# Patient Record
Sex: MALE | Race: WHITE | NOT HISPANIC OR LATINO | Employment: OTHER | URBAN - METROPOLITAN AREA
[De-identification: names, ages, dates, MRNs, and addresses within clinical notes are randomized per-mention and may not be internally consistent; named-entity substitution may affect disease eponyms.]

---

## 2017-01-19 ENCOUNTER — OFFICE VISIT (OUTPATIENT)
Dept: CARDIOLOGY | Facility: MEDICAL CENTER | Age: 77
End: 2017-01-19

## 2017-01-19 VITALS
HEART RATE: 60 BPM | WEIGHT: 160 LBS | OXYGEN SATURATION: 94 % | HEIGHT: 67 IN | SYSTOLIC BLOOD PRESSURE: 112 MMHG | BODY MASS INDEX: 25.11 KG/M2 | DIASTOLIC BLOOD PRESSURE: 70 MMHG

## 2017-01-19 DIAGNOSIS — I25.10 CORONARY ARTERY DISEASE DUE TO CALCIFIED CORONARY LESION: ICD-10-CM

## 2017-01-19 DIAGNOSIS — I25.10 ATHEROSCLEROSIS OF NATIVE CORONARY ARTERY OF NATIVE HEART WITHOUT ANGINA PECTORIS: ICD-10-CM

## 2017-01-19 DIAGNOSIS — I25.84 CORONARY ARTERY DISEASE DUE TO CALCIFIED CORONARY LESION: ICD-10-CM

## 2017-01-19 DIAGNOSIS — E78.5 DYSLIPIDEMIA: ICD-10-CM

## 2017-01-19 DIAGNOSIS — Z78.9 STATIN INTOLERANCE: ICD-10-CM

## 2017-01-19 DIAGNOSIS — I10 ESSENTIAL HYPERTENSION: ICD-10-CM

## 2017-01-19 DIAGNOSIS — Z95.5 STENTED CORONARY ARTERY: ICD-10-CM

## 2017-01-19 PROCEDURE — 99214 OFFICE O/P EST MOD 30 MIN: CPT | Performed by: INTERNAL MEDICINE

## 2017-01-19 NOTE — MR AVS SNAPSHOT
"        Arcenio Barnett   2017 2:30 PM   Office Visit   MRN: [id number]    Department:  [facility]   Dept Phone:  [phone]    Description:  Male : 1940   Provider:  Zaheer Winter M.D.           Reason for Visit     Follow-Up Patient had labs done at [facility] today and here for a recheck.       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       You were diagnosed with     Essential hypertension   [7996254]       Dyslipidemia   [652348]       Coronary artery disease due to calcified coronary lesion   [6486203]       Stented coronary artery   [969909]       Atherosclerosis of native coronary artery of native heart without angina pectoris   [2109871]       Statin intolerance   [490470]         Vital Signs     Blood Pressure Pulse Height Weight Body Mass Index Oxygen Saturation    112/70 mmHg 60 1.702 m (5' 7.01\") 72.576 kg (160 lb) 25.05 kg/m2 94%    Smoking Status                   Never Smoker            Basic Information     Date Of Birth Sex Race Ethnicity Preferred Language    1940 Male White Non- English      Your appointments     Feb 15, 2017  1:00 PM   Established Patient with DARA Alcazar   [facility] [address]    [address]  [facility] [address]  [place] [address]   [phone]           You will be receiving a confirmation call a few days before your appointment from our automated call confirmation system.              Problem List              ICD-10-CM Priority Class Noted - Resolved    Dyslipidemia E78.5   10/10/2014 - Present    Essential hypertension I10   2015 - Present    Coronary artery disease involving autologous vein coronary bypass graft without angina pectoris I25.810   2015 - Present    Cholelithiasis K80.20   2016 - Present    Benign prostatic hyperplasia N40.0   8/10/2016 - Present      Health Maintenance       "    Date Due Completion Dates    IMM DTaP/Tdap/Td Vaccine (1 - Tdap) 1/1/1959 ---    IMM ZOSTER VACCINE 1/1/2000 ---    IMM PNEUMOCOCCAL 65+ (ADULT) LOW/MEDIUM RISK SERIES (1 of 2 - PCV13) 1/1/2005 ---    IMM INFLUENZA (1) 9/1/2016 11/10/2014    COLONOSCOPY 10/16/2025 10/16/2015 (Declined)    Override on 10/16/2015: Patient Declined (pt declined)            Current Immunizations     Influenza Vaccine Adult HD 11/10/2014      Below and/or attached are the medications your provider expects you to take. Review all of your home medications and newly ordered medications with your provider and/or pharmacist. Follow medication instructions as directed by your provider and/or pharmacist. Please keep your medication list with you and share with your provider. Update the information when medications are discontinued, doses are changed, or new medications (including over-the-counter products) are added; and carry medication information at all times in the event of emergency situations     Allergies:  STATINS - (reactions not documented)               Medications  Valid as of: January 19, 2017 -  3:02 PM    Generic Name Brand Name Tablet Size Instructions for use    Alirocumab (Solution Pen-injector) Alirocumab 75 MG/ML Inject 75 mg as instructed every 14 days.        Aspirin (Chew Tab) ASA 81 MG Take 81 mg by mouth every day.        Coenzyme Q10 (Cap) CoQ10 50 MG Take  by mouth.        Ezetimibe (Tab) ZETIA 10 MG Take 1 Tab by mouth every day.        Lisinopril (Tab) PRINIVIL 5 MG Take 1 Tab by mouth every day.        Metoprolol Succinate (TABLET SR 24 HR) TOPROL XL 50 MG Take 1 Tab by mouth every day.        Terazosin HCl (Cap) HYTRIN 10 MG TAKE ONE CAPSULE BY MOUTH AT BEDTIME        Ticagrelor (Tab) BRILINTA 90 MG Take 1 Tab by mouth 2 Times a Day.        .                 Medicines prescribed today were sent to:     [facility]  [place] [address]    [address] [facility] [place] [address]    Phone:  [phone] Fax: [phone]    Open 24 Hours?: No      Medication refill instructions:       If your prescription bottle indicates you have medication refills left, it is not necessary to call your provider’s office. Please contact your pharmacy and they will refill your medication.    If your prescription bottle indicates you do not have any refills left, you may request refills at any time through one of the following ways: The online [email or web address] system (except Urgent Care), by calling your provider’s office, or by asking your pharmacy to contact your provider’s office with a refill request. Medication refills are processed only during regular business hours and may not be available until the next business day. Your provider may request additional information or to have a follow-up visit with you prior to refilling your medication.   *Please Note: Medication refills are assigned a new Rx number when refilled electronically. Your pharmacy may indicate that no refills were authorized even though a new prescription for the same medication is available at the pharmacy. Please request the medicine by name with the pharmacy before contacting your provider for a refill.        Your To Do List     Future Labs/Procedures Complete By Expires    COMP METABOLIC PANEL  As directed 1/20/2018         [email or web address] Access Code: [email or web address]  Expires: 1/28/2017  9:07 AM    Hotlease.Com  A secure, online tool to manage your health information     [employer]’s Hotlease.Com® is a secure, online tool that connects you to your personalized health information from the privacy of your home -- day or night - making it very easy for you to manage your healthcare. Once the activation process is completed, you can even access your medical information using the Hotlease.Com román, which is available for free in the Apple Román store or Google Play store.     Hotlease.Com provides the following levels of access (as shown below):   My Chart Features   [facility]  Care Doctor [facility]  Specialists [facility]  Urgent  Care Non-[facility]  Primary Care  Doctor   Email your healthcare team securely and privately 24/7 X X X    Manage appointments: schedule your next appointment; view details of past/upcoming appointments X      Request prescription refills. X      View recent personal medical records, including lab and immunizations X X X X   View health record, including health history, allergies, medications X X X X   Read reports about your outpatient visits, procedures, consult and ER notes X X X X   See your discharge summary, which is a recap of your hospital and/or ER visit that includes your diagnosis, lab results, and care plan. X X       How to register for extraTKT:  1. Go to  [email or web address].  2. Click on the Sign Up Now box, which takes you to the New Member Sign Up page. You will need to provide the following information:  a. Enter your extraTKT Access Code exactly as it appears at the top of this page. (You will not need to use this code after you’ve completed the sign-up process. If you do not sign up before the expiration date, you must request a new code.)   b. Enter your date of birth.   c. Enter your home email address.   d. Click Submit, and follow the next screen’s instructions.  3. Create a extraTKT ID. This will be your extraTKT login ID and cannot be changed, so think of one that is secure and easy to remember.  4. Create a extraTKT password. You can change your password at any time.  5. Enter your Password Reset Question and Answer. This can be used at a later time if you forget your password.   6. Enter your e-mail address. This allows you to receive e-mail notifications when new information is available in extraTKT.  7. Click Sign Up. You can now view your health information.    For assistance activating your extraTKT account, call [phone]

## 2017-01-23 ENCOUNTER — TELEPHONE (OUTPATIENT)
Dept: CARDIOLOGY | Facility: MEDICAL CENTER | Age: 77
End: 2017-01-23

## 2017-01-23 ASSESSMENT — ENCOUNTER SYMPTOMS
EYE PAIN: 0
VOMITING: 0
BLURRED VISION: 0
NAUSEA: 0
MYALGIAS: 0
HEADACHES: 0
BLOOD IN STOOL: 0
SHORTNESS OF BREATH: 0
LOSS OF CONSCIOUSNESS: 0
DIZZINESS: 0
HALLUCINATIONS: 0
CLAUDICATION: 0
CHILLS: 0
BRUISES/BLEEDS EASILY: 0
ABDOMINAL PAIN: 0
SENSORY CHANGE: 0
FEVER: 0
FALLS: 0
ORTHOPNEA: 0
DEPRESSION: 0
WEIGHT LOSS: 0
COUGH: 0
PND: 0
SPEECH CHANGE: 0
EYE DISCHARGE: 0
DOUBLE VISION: 0
PALPITATIONS: 0

## 2017-01-23 NOTE — TELEPHONE ENCOUNTER
PAR sent to plan:  Arcenio Barnett (Almanzar: [id number])  Praluent 75MG/ML pen-injectors  Status: Sent to Plan  Created: January 23rd, 2017

## 2017-01-23 NOTE — PROGRESS NOTES
Subjective:   Arcenio Barnett is a 76 y.o. male who presents today for cardiac care. Patient has a history of coronary atrial disease status post stenting of his left and right coronary artery per patient report. The last procedure was done in 2007. Both procedures were done in [facility].     Also, prior to last visit, patient was seen at [facility] for chest pain and found to have ST elevation MI. Patient was taken to the Cath Lab and had in-stent restenosis of the LAD stents. Patient underwent PCI and stenting of the LAD in 6/2016. (preserved LVF)    In the interim, patient has been doing well without having any symptoms. Patient denies having chest pain, dyspnea, palpitation, presyncope, syncope episodes. Able to climb up at least 2 flights of stairs.    He is not tolerating Pravachol and other form of statin due to muscle pain.    Past Medical History   Diagnosis Date   • Allergy    • History of bladder infections    • CAD (coronary artery disease)      cardiac stents x 2 2000, [place], [facility] 2007 prox LAD   • History of coronary artery stent placement 8/1/2014     Prox LAD, also RCA   • Hyperlipidemia    • Hypertension    • Cholelithiasis      History reviewed. No pertinent past surgical history.  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      Outpatient Encounter Prescriptions as of 1/19/2017   Medication Sig Dispense Refill   • Alirocumab (PRALUENT) 75 MG/ML Solution Pen-injector Inject 75 mg as instructed every 14 days. 2 PEN 11   • terazosin (HYTRIN) 10 MG capsule TAKE ONE  "CAPSULE BY MOUTH AT BEDTIME 90 Cap 2   • ticagrelor (BRILINTA) 90 MG Tab tablet Take 1 Tab by mouth 2 Times a Day. 60 Tab 11   • metoprolol SR (TOPROL XL) 50 MG TABLET SR 24 HR Take 1 Tab by mouth every day. 30 Tab 11   • lisinopril (PRINIVIL) 5 MG Tab Take 1 Tab by mouth every day. 90 Tab 3   • aspirin (ASA) 81 MG Chew Tab chewable tablet Take 81 mg by mouth every day.     • ezetimibe (ZETIA) 10 MG Tab Take 1 Tab by mouth every day. 90 Tab 1   • Coenzyme Q10 (COQ10) 50 MG Cap Take  by mouth.     • [DISCONTINUED] pravastatin (PRAVACHOL) 20 MG Tab Take 1 Tab by mouth every day. (Patient not taking: Reported on 1/19/2017) 30 Tab 11     No facility-administered encounter medications on file as of 1/19/2017.     Review of Systems   Constitutional: Negative for fever, chills, weight loss and malaise/fatigue.   HENT: Negative for ear discharge, ear pain, hearing loss and nosebleeds.    Eyes: Negative for blurred vision, double vision, pain and discharge.   Respiratory: Negative for cough and shortness of breath.    Cardiovascular: Negative for chest pain, palpitations, orthopnea, claudication, leg swelling and PND.   Gastrointestinal: Negative for nausea, vomiting, abdominal pain, blood in stool and melena.   Genitourinary: Negative for dysuria and hematuria.   Musculoskeletal: Negative for myalgias, joint pain and falls.   Skin: Negative for itching and rash.   Neurological: Negative for dizziness, sensory change, speech change, loss of consciousness and headaches.   Endo/Heme/Allergies: Negative for environmental allergies. Does not bruise/bleed easily.   Psychiatric/Behavioral: Negative for depression, suicidal ideas and hallucinations.        Objective:   /70 mmHg  Pulse 60  Ht 1.702 m (5' 7.01\")  Wt 72.576 kg (160 lb)  BMI 25.05 kg/m2  SpO2 94%    Physical Exam   Constitutional: He is oriented to person, place, and time. No distress.   HENT:   Head: Normocephalic and atraumatic.   Eyes: EOM are normal. "   Neck: Normal range of motion. No JVD present.   Cardiovascular: Normal rate, regular rhythm, normal heart sounds and intact distal pulses.  Exam reveals no gallop and no friction rub.    No murmur heard.  Bilateral femoral pulses are 2+, bilateral dorsalis pedis pulses are 2+, bilateral posterior tibialis pulses are 2+.   Pulmonary/Chest: No respiratory distress. He has no wheezes. He has no rales. He exhibits no tenderness.   Abdominal: Soft. Bowel sounds are normal. There is no tenderness. There is no rebound and no guarding.   The is no presence of abdominal bruits   Musculoskeletal: Normal range of motion. He exhibits no edema or tenderness.   Neurological: He is alert and oriented to person, place, and time.   Skin: Skin is warm and dry.   Psychiatric: He has a normal mood and affect.   Nursing note and vitals reviewed.      Assessment:     1. Essential hypertension  Alirocumab (PRALUENT) 75 MG/ML Solution Pen-injector    COMP METABOLIC PANEL    LIPID PANEL   2. Dyslipidemia  Alirocumab (PRALUENT) 75 MG/ML Solution Pen-injector    COMP METABOLIC PANEL    LIPID PANEL   3. Coronary artery disease due to calcified coronary lesion  Alirocumab (PRALUENT) 75 MG/ML Solution Pen-injector    COMP METABOLIC PANEL    LIPID PANEL   4. Stented coronary artery  Alirocumab (PRALUENT) 75 MG/ML Solution Pen-injector    COMP METABOLIC PANEL    LIPID PANEL   5. Atherosclerosis of native coronary artery of native heart without angina pectoris  Alirocumab (PRALUENT) 75 MG/ML Solution Pen-injector    COMP METABOLIC PANEL    LIPID PANEL   6. Statin intolerance  Alirocumab (PRALUENT) 75 MG/ML Solution Pen-injector    COMP METABOLIC PANEL    LIPID PANEL       Medical Decision Making:  Today's Assessment / Status / Plan:     Blood pressure is well controlled.    At this time, because of his atherosclerosis and prior history of coronary stenting, I think patient is a great candidate for PCSK9 inhibitors. Patient was informed about the  risks and benefits and at this time he has agreed to proceed with this therapy.    Continue lisinopril, aspirin, Brilinta.

## 2017-01-23 NOTE — TELEPHONE ENCOUNTER
----- Message from Zaheer Winter M.D. sent at 1/19/2017  2:52 PM PST -----  Dear Erin,    Can you please have Praluent approved for this patient?    Thank you,  Vishal.

## 2017-01-26 ENCOUNTER — TELEPHONE (OUTPATIENT)
Dept: CARDIOLOGY | Facility: MEDICAL CENTER | Age: 77
End: 2017-01-26

## 2017-01-26 NOTE — TELEPHONE ENCOUNTER
----- Message from Mahnaz Presley sent at 1/26/2017 10:25 AM PST -----  Regarding: patient wants to change his prescription for Zetia to Dr. Moy RAMIREZ/Suzan        Patient usually gets his Zetia prescribed by Dr. Murray. He wants to know if he can change it so he gets the medication from Dr. Moy flores. He can be reached at [phone]

## 2017-01-26 NOTE — TELEPHONE ENCOUNTER
Spoke with patient who states he gets his Zetia through [facility] Tel: [phone]  Telephone call to number and it is the [employer] Patient Assistance Program.  He needs to re-apply to the program.  Advised him that he will need to complete paper work again and when he has completed his portion Dr. Winter's portion can be completed and faxed back to company.  He is agreeable with plan.  He is also working on Praluent application he states.

## 2017-02-08 ENCOUNTER — TELEPHONE (OUTPATIENT)
Dept: CARDIOLOGY | Facility: MEDICAL CENTER | Age: 77
End: 2017-02-08

## 2017-02-08 NOTE — TELEPHONE ENCOUNTER
TC to patient who would not speak English to me as he had in the past.  He was to reapply for the [facility] patient assistance program to get his Zetia.  He was given the number to call and get the paper work  Telephone [phone]  Await call back from someone willing to speak with me

## 2017-02-08 NOTE — TELEPHONE ENCOUNTER
----- Message from Maggie Mccarthy sent at 2/8/2017  9:30 AM PST -----  Regarding: status of Zetia paperwork  Contact: [phone]  JIM/susan  Pt calling for status of the paperwork (reference to 1/26 notes) for [facility].   Please call to sorin, [phone].

## 2017-02-14 NOTE — TELEPHONE ENCOUNTER
Spoke with patient who completed his portions and scanned into chart shows [facility] 1[facility]30/17 [facility]  He will contact them to see status.

## 2017-02-14 NOTE — TELEPHONE ENCOUNTER
Pt brought to office another form for [facility] Patient Assistance for Zetia.  Form completed and mailed.  Sent to scan.

## 2017-02-15 ENCOUNTER — APPOINTMENT (OUTPATIENT)
Dept: MEDICAL GROUP | Facility: MEDICAL CENTER | Age: 77
End: 2017-02-15
Payer: MEDICARE

## 2017-02-15 NOTE — TELEPHONE ENCOUNTER
Pt brought in another form from [facility], completed signed by Dr. Winter and [facility]  Scanned to chart.

## 2017-04-10 ENCOUNTER — HOSPITAL ENCOUNTER (OUTPATIENT)
Dept: HOSPITAL 8 - PETCFH | Age: 77
Discharge: HOME | End: 2017-04-10
Attending: PHYSICIAN ASSISTANT
Payer: MEDICARE

## 2017-04-10 DIAGNOSIS — M47.896: Primary | ICD-10-CM

## 2017-04-10 DIAGNOSIS — G89.29: ICD-10-CM

## 2017-04-10 DIAGNOSIS — M47.898: ICD-10-CM

## 2017-04-10 DIAGNOSIS — M54.5: ICD-10-CM

## 2017-04-10 DIAGNOSIS — M17.9: ICD-10-CM

## 2017-04-10 PROCEDURE — 78306 BONE IMAGING WHOLE BODY: CPT

## 2017-04-10 PROCEDURE — A9503 TC99M MEDRONATE: HCPCS

## 2017-06-30 DIAGNOSIS — I10 ESSENTIAL HYPERTENSION: ICD-10-CM

## 2017-06-30 RX ORDER — METOPROLOL SUCCINATE 50 MG/1
TABLET, EXTENDED RELEASE ORAL
Qty: 30 TAB | Refills: 11 | Status: SHIPPED | OUTPATIENT
Start: 2017-06-30 | End: 2018-03-21

## 2017-07-18 ENCOUNTER — TELEPHONE (OUTPATIENT)
Dept: MEDICAL GROUP | Facility: MEDICAL CENTER | Age: 77
End: 2017-07-18

## 2017-07-18 NOTE — TELEPHONE ENCOUNTER
1. Caller Name: Cara Winter Office                                         Call Back Number: [phone]        Patient approves a detailed voicemail message: N\A    2. SPECIFIC Action To Be Taken: Referral for Cardiology, Cara called for the patient she said the patient has an up coming appointment to see DR. WINTER for E 78.5, I 10 and I 25.810 Cara wanted to know if you would place a referral for the patient to see Dr. Vishal Winter with extra visits? Please advise    3. Diagnosis/Clinical Reason for Request: E 78.5, I 10 and I 25.810    4. Specialty & Provider Name/Lab/Imaging Location:  [facility]     5. Is appointment scheduled for requested order/referral: YES  7/20  At 2:45

## 2017-07-19 DIAGNOSIS — I25.810 CORONARY ARTERY DISEASE INVOLVING AUTOLOGOUS VEIN CORONARY BYPASS GRAFT WITHOUT ANGINA PECTORIS: ICD-10-CM

## 2017-08-02 DIAGNOSIS — I25.84 CORONARY ARTERY DISEASE DUE TO CALCIFIED CORONARY LESION: ICD-10-CM

## 2017-08-02 DIAGNOSIS — I25.10 CORONARY ARTERY DISEASE DUE TO CALCIFIED CORONARY LESION: ICD-10-CM

## 2017-08-02 RX ORDER — LISINOPRIL 5 MG/1
TABLET ORAL
Qty: 90 TAB | Refills: 2 | Status: SHIPPED | OUTPATIENT
Start: 2017-08-02 | End: 2018-12-03 | Stop reason: SDUPTHER

## 2017-10-02 RX ORDER — TERAZOSIN 10 MG/1
CAPSULE ORAL
Refills: 0 | OUTPATIENT
Start: 2017-10-02

## 2017-10-02 NOTE — TELEPHONE ENCOUNTER
Patient warned on last refill request that he needed a visit for any further refills because he has not been seen since 8/16. I do not see that he has made an appointment. Please contact him and have him come in this week.

## 2018-03-15 ENCOUNTER — TELEPHONE (OUTPATIENT)
Dept: CARDIOLOGY | Facility: MEDICAL CENTER | Age: 78
End: 2018-03-15

## 2018-03-20 ENCOUNTER — TELEPHONE (OUTPATIENT)
Dept: CARDIOLOGY | Facility: MEDICAL CENTER | Age: 78
End: 2018-03-20

## 2018-03-20 NOTE — TELEPHONE ENCOUNTER
LVM for pt to se if he had recent labs done, office visit in Jan with TW lab slip was given to pt. Upcoming office visit with TW on 2/23/2018

## 2018-03-20 NOTE — TELEPHONE ENCOUNTER
Pt called back and has had labs done at [facility]. Faxed request to [phone]

## 2018-03-21 ENCOUNTER — OFFICE VISIT (OUTPATIENT)
Dept: CARDIOLOGY | Facility: MEDICAL CENTER | Age: 78
End: 2018-03-21
Payer: MEDICARE

## 2018-03-21 VITALS
WEIGHT: 158 LBS | BODY MASS INDEX: 24.8 KG/M2 | HEIGHT: 67 IN | SYSTOLIC BLOOD PRESSURE: 158 MMHG | DIASTOLIC BLOOD PRESSURE: 88 MMHG | OXYGEN SATURATION: 96 % | HEART RATE: 52 BPM

## 2018-03-21 DIAGNOSIS — I10 ESSENTIAL HYPERTENSION: ICD-10-CM

## 2018-03-21 DIAGNOSIS — I25.10 CORONARY ARTERY DISEASE INVOLVING NATIVE CORONARY ARTERY OF NATIVE HEART WITHOUT ANGINA PECTORIS: ICD-10-CM

## 2018-03-21 DIAGNOSIS — I25.5 ISCHEMIC CARDIOMYOPATHY: ICD-10-CM

## 2018-03-21 DIAGNOSIS — E78.5 DYSLIPIDEMIA: ICD-10-CM

## 2018-03-21 PROCEDURE — 99214 OFFICE O/P EST MOD 30 MIN: CPT | Performed by: INTERNAL MEDICINE

## 2018-03-21 RX ORDER — CARVEDILOL 12.5 MG/1
12.5 TABLET ORAL 2 TIMES DAILY WITH MEALS
Qty: 180 TAB | Refills: 3 | Status: SHIPPED | OUTPATIENT
Start: 2018-03-21 | End: 2018-12-03 | Stop reason: SDUPTHER

## 2018-03-21 RX ORDER — PRAVASTATIN SODIUM 20 MG
20 TABLET ORAL DAILY
Qty: 90 TAB | Refills: 3 | Status: SHIPPED | OUTPATIENT
Start: 2018-03-21 | End: 2018-06-26

## 2018-03-21 RX ORDER — CARVEDILOL 6.25 MG/1
6.25 TABLET ORAL 2 TIMES DAILY WITH MEALS
COMMUNITY
End: 2018-03-21 | Stop reason: SDUPTHER

## 2018-03-21 NOTE — PROGRESS NOTES
Chief Complaint   Patient presents with   • Coronary Artery Disease   • Chest Pressure       Subjective:   Arcenio Barnett is a 78 y.o. male who presents today to establish care with a new cardiologist previous patient of Dr. Vishal breen and Dr. Mohsen at [facility]. History of CAD status post PCI LAD and RCA 2007 with subsequent in-stent restenosis of the LAD 6/2016 with a new ABHISHEK placed. He's been maintained on aspirin and Brilinta now low-dose, beta blocker and Ace. There was confusion with his medical regimen and he is currently taking Toprol-XL and Coreg. Blood pressure is suboptimal. Has not had recent lab tests. Is not on a statin due to history of myalgias although he indicates he tolerated pravastatin just fine. Had an episode of 10 2nd sharp stabbing pain while trying to go to bed on the 1st of this month that has not recurred. Again it lasted 10 seconds without associated with anything else and was made worse by deep breathing. Subsequently he has been moderately active without any significant symptoms.    Denies any other cardiovascular symptoms including shortness of breath, dyspnea on exertion, lightheadedness, syncope or presyncope, lower extremity edema, PND, orthopnea or palpitations.      Past Medical History:   Diagnosis Date   • Allergy    • CAD (coronary artery disease)     cardiac stents x 2 2000, 2007 [place] 2007 prox LAD   • Cholelithiasis    • History of bladder infections    • History of coronary artery stent placement 8/1/2014    Prox LAD, also RCA   • Hyperlipidemia    • Hypertension      History reviewed. No pertinent surgical history.  Family History   Problem Relation Age of Onset   • Cancer Father 57     lung cancer   • Cancer Brother 52     lung cancer   • Cancer Sister      breast   • Cancer Sister      breast   • Cancer Sister      pancreatic   • Hyperlipidemia Mother    • Stroke Mother      Social History     Social History   • Marital status:  "     Spouse name: N/A   • Number of children: N/A   • Years of education: N/A     Occupational History   • Not on file.     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      Outpatient Encounter Prescriptions as of 3/21/2018   Medication Sig Dispense Refill   • carvedilol (COREG) 12.5 MG Tab Take 1 Tab by mouth 2 times a day, with meals. 180 Tab 3   • Ticagrelor 60 MG Tab Take 60 mg by mouth 2 Times a Day. 180 Tab 3   • pravastatin (PRAVACHOL) 20 MG Tab Take 1 Tab by mouth every day. 90 Tab 3   • lisinopril (PRINIVIL) 5 MG Tab TAKE ONE TABLET BY MOUTH ONCE DAILY 90 Tab 2   • terazosin (HYTRIN) 10 MG capsule TAKE ONE CAPSULE BY MOUTH AT BEDTIME 90 Cap 0   • aspirin (ASA) 81 MG Chew Tab chewable tablet Take 81 mg by mouth every day.     • [DISCONTINUED] carvedilol (COREG) 6.25 MG Tab Take 6.25 mg by mouth 2 times a day, with meals.     • [DISCONTINUED] metoprolol SR (TOPROL XL) 50 MG TABLET SR 24 HR TAKE ONE TABLET BY MOUTH ONCE DAILY 30 Tab 11   • [DISCONTINUED] Alirocumab (PRALUENT) 75 MG/ML Solution Pen-injector Inject 75 mg as instructed every 14 days. 2 PEN 11   • [DISCONTINUED] Coenzyme Q10 (COQ10) 50 MG Cap Take  by mouth.     • [DISCONTINUED] ticagrelor (BRILINTA) 90 MG Tab tablet Take 1 Tab by mouth 2 Times a Day. 60 Tab 11   • [DISCONTINUED] ezetimibe (ZETIA) 10 MG Tab Take 1 Tab by mouth every day. (Patient not taking: Reported on 3/21/2018) 90 Tab 1     No facility-administered encounter medications on file as of 3/21/2018.      Review of Systems   All other systems reviewed and are negative.       Objective:   /88   Pulse (!) 52   Ht 1.702 m (5' 7\")   Wt 71.7 kg (158 lb)   " SpO2 96%   BMI 24.75 kg/m²     Physical Exam   Constitutional: He is oriented to person, place, and time. He appears well-developed and well-nourished. No distress.   HENT:   Head: Normocephalic and atraumatic.   Right Ear: External ear normal.   Left Ear: External ear normal.   Eyes: Conjunctivae and EOM are normal. Pupils are equal, round, and reactive to light. Right eye exhibits no discharge. Left eye exhibits no discharge. No scleral icterus.   Neck: Normal range of motion. Neck supple. No JVD present. No tracheal deviation present. No thyromegaly present.   Cardiovascular: Normal rate, regular rhythm and intact distal pulses.  PMI is not displaced.  Exam reveals no gallop and no friction rub.    No murmur heard.  Pulses:       Carotid pulses are 2+ on the right side, and 2+ on the left side.       Radial pulses are 2+ on the left side.        Popliteal pulses are 2+ on the right side, and 2+ on the left side.        Dorsalis pedis pulses are 2+ on the right side, and 2+ on the left side.        Posterior tibial pulses are 2+ on the right side, and 2+ on the left side.   Pulmonary/Chest: Effort normal and breath sounds normal. No respiratory distress. He has no wheezes. He has no rales. He exhibits no tenderness.   Abdominal: Soft. Bowel sounds are normal. He exhibits no distension. There is no tenderness.   Musculoskeletal: Normal range of motion. He exhibits no edema, tenderness or deformity.   Neurological: He is alert and oriented to person, place, and time. No cranial nerve deficit (cranial nerves II through XII grossly intact). Coordination normal.   Skin: Skin is warm and dry. No rash noted. He is not diaphoretic. No erythema. No pallor.   Psychiatric: He has a normal mood and affect. His behavior is normal. Thought content normal.   Vitals reviewed.      Assessment:     1. CAD s/p MVPCI  carvedilol (COREG) 12.5 MG Tab    Ticagrelor 60 MG Tab    ECHOCARDIOGRAM COMP W/O CONT    pravastatin (PRAVACHOL) 20  MG Tab    PCI LAD and RCA 2007  PCI LAD ISR ABHISHEK STEMI 5/2016   2. Essential hypertension  carvedilol (COREG) 12.5 MG Tab   3. Dyslipidemia  pravastatin (PRAVACHOL) 20 MG Tab    COMP METABOLIC PANEL    LIPID PROFILE   4. Ischemic cardiomyopathy  Ticagrelor 60 MG Tab    ECHOCARDIOGRAM COMP W/O CONT    Recovered LVEF       Medical Decision Making:  Today's Assessment / Status / Plan:     He has an apparently recovered LVEF according to records. His symptoms were noncardiac in nature. They have not recurred. He does have significant COPD so I will have a low threshold for repeat stress testing. He has a history of mild valvular disease and that should be rechecked. He should only be on one type of beta blocker and I will recommended Coreg and discontinuing Toprol as he has poorly controlled hypertension. Recommend reinitiating the statin he tolerated which was pravastatin 20 mg. I agree with long-term dual antiplatelet therapy. The current regimen is acceptable.    1. Echocardiogram  2. Discontinue Toprol-XL and increase Coreg to 12.5 mg twice a day  3. Continue aspirin 81 mg and take a regular 60 mg twice a day with lifelong DAPT as tolerated  4. Restart pravastatin 20 mg daily as he tolerated this well in the past. He did not tolerate atorvastatin or Crestor. Right PCSK9 inhibitor therapy but did not take it.    Follow-up after testing.

## 2018-03-21 NOTE — PATIENT INSTRUCTIONS
1. STOP METOPROLOL    2. INCREASE CARVEDILOL TO DOUBLE    3. CONTINUE BRILINTA 60MG 2X/DAY    4. CONTINUE ASPIRIN 81MG DAILY    5. RESTART PRAVASTATIN    6. LABS IN 2 MONTHS    7. ECHOCARDIOGRAM    8. FUV IN 3M

## 2018-03-27 ENCOUNTER — TELEPHONE (OUTPATIENT)
Dept: CARDIOLOGY | Facility: MEDICAL CENTER | Age: 78
End: 2018-03-27

## 2018-03-27 DIAGNOSIS — E78.5 DYSLIPIDEMIA: ICD-10-CM

## 2018-03-27 NOTE — TELEPHONE ENCOUNTER
Patient assistance form received for [employer] to cover Zetia prescription. Pt needs Dr. Lopez to sign to get prescription covered. Per TW last OV note from 3/21, it was noted pt was not taking Zetia.     Called to s/w pt. S/w pt's wife, notes that Dr. Lopez noted that it would be okay for him to start taking Zetia again instead of pravastatin. Informed her that I will need to FU with Dr. Lopez. Educated that he is out of the office this week, they are OK with waiting. Educated that I will FU once Dr. Lopez advise's. Wife appreciative of assistance.     Information placed in TW's folder to review.

## 2018-04-12 RX ORDER — EZETIMIBE 10 MG/1
10 TABLET ORAL DAILY
Qty: 90 TAB | Refills: 3 | COMMUNITY
Start: 2018-04-12 | End: 2021-03-01

## 2018-04-12 NOTE — TELEPHONE ENCOUNTER
Information reviewed by Dr. Lopez. TW okay for pt to take Zetia 10mg qDaily. Patient assistance form signed and filled out. Information sent in mail to preaddressed envelope to [facility] Assistance Program [address] [place] [id number].    Med/rec updated and patient notified. He is appreciative of assistance and denies need for copy mailed to home address. Copy placed in scan.

## 2018-05-03 ENCOUNTER — OFFICE VISIT (OUTPATIENT)
Dept: MEDICAL GROUP | Facility: MEDICAL CENTER | Age: 78
End: 2018-05-03
Payer: MEDICARE

## 2018-05-03 ENCOUNTER — HOSPITAL ENCOUNTER (OUTPATIENT)
Facility: MEDICAL CENTER | Age: 78
End: 2018-05-03
Attending: FAMILY MEDICINE
Payer: MEDICARE

## 2018-05-03 VITALS
BODY MASS INDEX: 24.64 KG/M2 | SYSTOLIC BLOOD PRESSURE: 108 MMHG | HEIGHT: 67 IN | HEART RATE: 53 BPM | WEIGHT: 157 LBS | OXYGEN SATURATION: 100 % | TEMPERATURE: 99.1 F | DIASTOLIC BLOOD PRESSURE: 66 MMHG

## 2018-05-03 DIAGNOSIS — Z12.5 PROSTATE CANCER SCREENING: ICD-10-CM

## 2018-05-03 DIAGNOSIS — Z76.89 ENCOUNTER TO ESTABLISH CARE WITH NEW DOCTOR: ICD-10-CM

## 2018-05-03 DIAGNOSIS — Z00.00 MEDICARE ANNUAL WELLNESS VISIT, SUBSEQUENT: ICD-10-CM

## 2018-05-03 DIAGNOSIS — I10 ESSENTIAL HYPERTENSION: ICD-10-CM

## 2018-05-03 DIAGNOSIS — N40.0 BENIGN PROSTATIC HYPERPLASIA, UNSPECIFIED WHETHER LOWER URINARY TRACT SYMPTOMS PRESENT: ICD-10-CM

## 2018-05-03 DIAGNOSIS — Z79.891 CHRONIC USE OF OPIATE DRUGS THERAPEUTIC PURPOSES: ICD-10-CM

## 2018-05-03 DIAGNOSIS — E55.9 VITAMIN D DEFICIENCY: ICD-10-CM

## 2018-05-03 DIAGNOSIS — M17.0 BILATERAL PRIMARY OSTEOARTHRITIS OF KNEE: ICD-10-CM

## 2018-05-03 DIAGNOSIS — I25.10 CORONARY ARTERY DISEASE INVOLVING NATIVE CORONARY ARTERY OF NATIVE HEART WITHOUT ANGINA PECTORIS: ICD-10-CM

## 2018-05-03 DIAGNOSIS — E78.5 DYSLIPIDEMIA: ICD-10-CM

## 2018-05-03 DIAGNOSIS — B35.1 ONYCHOMYCOSIS: ICD-10-CM

## 2018-05-03 DIAGNOSIS — B35.3 TINEA PEDIS OF BOTH FEET: ICD-10-CM

## 2018-05-03 LAB — AMBIGUOUS DTTM AMBI4: NORMAL

## 2018-05-03 PROCEDURE — 80307 DRUG TEST PRSMV CHEM ANLYZR: CPT

## 2018-05-03 PROCEDURE — 99214 OFFICE O/P EST MOD 30 MIN: CPT | Performed by: FAMILY MEDICINE

## 2018-05-03 RX ORDER — HYDROCODONE BITARTRATE AND ACETAMINOPHEN 10; 325 MG/1; MG/1
.5-1 TABLET ORAL
Qty: 30 TAB | Refills: 0 | Status: SHIPPED | OUTPATIENT
Start: 2018-05-03 | End: 2018-06-02

## 2018-05-03 RX ORDER — PRENATAL VIT 91/IRON/FOLIC/DHA 28-975-200
1 COMBINATION PACKAGE (EA) ORAL 2 TIMES DAILY
Qty: 30 G | Refills: 1 | Status: SHIPPED | OUTPATIENT
Start: 2018-05-03 | End: 2018-12-11

## 2018-05-03 ASSESSMENT — PATIENT HEALTH QUESTIONNAIRE - PHQ9
5. POOR APPETITE OR OVEREATING: 1 - SEVERAL DAYS
SUM OF ALL RESPONSES TO PHQ QUESTIONS 1-9: 8
CLINICAL INTERPRETATION OF PHQ2 SCORE: 2

## 2018-05-06 LAB
6MAM UR QL: NOT DETECTED
7AMINOCLONAZEPAM UR QL: NOT DETECTED
A-OH ALPRAZ UR QL: NOT DETECTED
ALPRAZ UR QL: NOT DETECTED
AMPHET UR QL SCN: NOT DETECTED
ANNOTATION COMMENT IMP: NORMAL
ANNOTATION COMMENT IMP: NORMAL
BARBITURATES UR QL: NOT DETECTED
BUPRENORPHINE UR QL: NOT DETECTED
BZE UR QL: NOT DETECTED
CARBOXYTHC UR QL: NOT DETECTED
CARISOPRODOL UR QL: NOT DETECTED
CLONAZEPAM UR QL: NOT DETECTED
CODEINE UR QL: NOT DETECTED
DIAZEPAM UR QL: NOT DETECTED
ETHYL GLUCURONIDE UR QL: NOT DETECTED
FENTANYL UR QL: NOT DETECTED
HYDROCODONE UR QL: NOT DETECTED
HYDROMORPHONE UR QL: NOT DETECTED
LORAZEPAM UR QL: NOT DETECTED
MDA UR QL: NOT DETECTED
MDEA UR QL: NOT DETECTED
MDMA UR QL: NOT DETECTED
MEPERIDINE UR QL: NOT DETECTED
METHADONE UR QL: NOT DETECTED
METHAMPHET UR QL: NOT DETECTED
MIDAZOLAM UR QL SCN: NOT DETECTED
MORPHINE UR QL: NOT DETECTED
NORBUPRENORPHINE UR QL CFM: NOT DETECTED
NORDIAZEPAM UR QL: NOT DETECTED
NORFENTANYL UR QL: NOT DETECTED
NORHYDROCODONE UR QL CFM: NOT DETECTED
NOROXYCODONE UR QL CFM: NOT DETECTED
NOROXYMORPH CO100 Q0458: NOT DETECTED
OXAZEPAM UR QL: NOT DETECTED
OXYCODONE UR QL: NOT DETECTED
OXYMORPHONE UR QL: NOT DETECTED
PATHOLOGY STUDY: NORMAL
PCP UR QL: NOT DETECTED
PHENTERMINE UR QL: NOT DETECTED
PPAA UR QL: NOT DETECTED
PROPOXYPH UR QL: NOT DETECTED
SERVICE CMNT-IMP: NORMAL
TAPENADOL OSULF CO200 Q0473: NOT DETECTED
TAPENTADOL UR QL SCN: NOT DETECTED
TEMAZEPAM UR QL: NOT DETECTED
TRAMADOL UR QL: NOT DETECTED
ZOLPIDEM UR QL: NOT DETECTED

## 2018-05-07 NOTE — ASSESSMENT & PLAN NOTE
Chronic, stable, well controlled. Patient taking blood pressure medications as directed.    No signs of angioedema (lip swelling, sensation of throat closure, airway compromise, wheezing, respiratory distress, hives, facial swelling).    ROS is NEGATIVE for dizziness, generalized weakness/fatigue, cold sweats, dizziness,  vision/hearing changes, jaw pain/paresthesias, BUE pain/paresthesias/numbness/weakness, chest pain/pressure, palpitations, dyspnea, nausea, RUQ abdominal pain, oliguria/anuria, BLE edema.

## 2018-05-07 NOTE — ASSESSMENT & PLAN NOTE
Chronic, stable, well controlled. Patient had multiple stents placed both in 2007 as well as 2016. Patient is being followed appropriately by cardiologist. Patient is taking medications as indicated for blood pressure as well as cholesterol and aspirin.    Review of most recent lipid profile in March 2018 reveals that his cholesterol panel is very well controlled on combination of pravastatin as well as Zetia.    ROS is NEGATIVE for dizziness, generalized weakness/fatigue, cold sweats, dizziness,  vision/hearing changes, jaw pain/paresthesias, BUE pain/paresthesias/numbness/weakness, chest pain/pressure, palpitations, dyspnea, nausea, RUQ abdominal pain, oliguria/anuria, BLE edema.

## 2018-05-07 NOTE — ASSESSMENT & PLAN NOTE
Patient reports having had vitamin D deficiency past. Review of labs from 2016 reveals that he level that was barely under normal limits at 29. Normal is 30. Patient is advised to take vitamin D supplementation at 2000 international units by mouth daily.

## 2018-05-07 NOTE — ASSESSMENT & PLAN NOTE
Patient and I discussed recent labs (see below; mixed DLD, well-controlled -- see media tab) and that ASCVD risk is increased based on most recent lipid panel, current blood pressure (hypertensive, with medication), diabetes status (non-diabetic), and smoking status (non-smoker).    Patient and I then discussed necessary dietary changes to make to address dyslipidemia.  Patient is currently taking cholesterol lowering medication: pravastatin, ezetimibe.  Patient verbalized understanding.    ROS is NEGATIVE for dizziness, generalized weakness/fatigue, vision/hearing changes, jaw pain/paresthesias, BUE pain/paresthesias/numbness/weakness, chest pain/pressure, palpitations, dyspnea, RUQ abdominal pain, oliguria/anuria, BLE edema.

## 2018-05-07 NOTE — ASSESSMENT & PLAN NOTE
"Chronic, uncontrolled with patient reporting he has \"bone-on-bone situation\" in bilateral knees.  This knee pain is so bad that it prevents her from walking any significant distance, states that he walks less than 100 feet before stopping. This is worsened with climbing up or down stairs.    ROS is NEGATIVE for BLE radicular pain/numbness/weakness, bilateral foot drop  "

## 2018-05-07 NOTE — ASSESSMENT & PLAN NOTE
Patient uses Norco 10/325 0.5-1 tablet by mouth daily when necessary. Patient states he takes his most days. This is secondary to his bilateral knee pain for which she was previously evaluated and informed he needs to have bilateral knee total arthroplasty. However, patient states that he did not get this at that time due to financial inability to pay. Additionally, apparently patient is a candidate for bilateral knee injections, at least not by the orthopedic surgeon at that time.    ROS is NEGATIVE for respiratory depression, cognitive slowing, constipation, cyanotic skin changes.

## 2018-05-07 NOTE — PROGRESS NOTES
"Subjective:   Chief Complaint/History of Present Illness:  Arcenio Barnett is a 78 y.o. male established patient who presents today to discuss medical problems as listed below    Diagnoses of Encounter to establish care with new doctor, Chronic use of opiate drugs therapeutic purposes, Bilateral primary osteoarthritis of knee, Tinea pedis of both feet, Onychomycosis, CAD s/p MVPCI, Essential hypertension, Dyslipidemia, Benign prostatic hyperplasia, unspecified whether lower urinary tract symptoms present, Prostate cancer screening, Vitamin D deficiency, and Medicare annual wellness visit, subsequent were pertinent to this visit.    Chronic use of opiate drugs therapeutic purposes  Patient uses Norco 10/325 0.5-1 tablet by mouth daily when necessary. Patient states he takes his most days. This is secondary to his bilateral knee pain for which she was previously evaluated and informed he needs to have bilateral knee total arthroplasty. However, patient states that he did not get this at that time due to financial inability to pay. Additionally, apparently patient is a candidate for bilateral knee injections, at least not by the orthopedic surgeon at that time.    ROS is NEGATIVE for respiratory depression, cognitive slowing, constipation, cyanotic skin changes.    Bilateral primary osteoarthritis of knee  Chronic, uncontrolled with patient reporting he has \"bone-on-bone situation\" in bilateral knees.  This knee pain is so bad that it prevents her from walking any significant distance, states that he walks less than 100 feet before stopping. This is worsened with climbing up or down stairs.    ROS is NEGATIVE for BLE radicular pain/numbness/weakness, bilateral foot drop    Tinea pedis of both feet  Patient states he has some sort of dermatitis of the bilateral soles of both his feet. Patient applies some sort of agent which dries out the bottoms of his feet.  Patient denies any ulcerations, purulent or serous drainage, " no bloody drainage. Patient without known history for diabetes.    ROS is NEGATIVE for generalized weakness/fatigue, generalized pruritis, jaundice, RUQ pain.      Patient denies binge or regular&heavy alcohol drinking behaviors, denies IV drug use, denies recent sexual intercourse with new partners.    Onychomycosis  Patient states that he has increased thickness and discoloration of his toenails. This is likely related to his probable fungal dermatitis of the soles of both his feet.    CAD s/p MVPCI  Chronic, stable, well controlled. Patient had multiple stents placed both in 2007 as well as 2016. Patient is being followed appropriately by cardiologist. Patient is taking medications as indicated for blood pressure as well as cholesterol and aspirin.    Review of most recent lipid profile in March 2018 reveals that his cholesterol panel is very well controlled on combination of pravastatin as well as Zetia.    ROS is NEGATIVE for dizziness, generalized weakness/fatigue, cold sweats, dizziness,  vision/hearing changes, jaw pain/paresthesias, BUE pain/paresthesias/numbness/weakness, chest pain/pressure, palpitations, dyspnea, nausea, RUQ abdominal pain, oliguria/anuria, BLE edema.    Essential hypertension  Chronic, stable, well controlled. Patient taking blood pressure medications as directed.    No signs of angioedema (lip swelling, sensation of throat closure, airway compromise, wheezing, respiratory distress, hives, facial swelling).    ROS is NEGATIVE for dizziness, generalized weakness/fatigue, cold sweats, dizziness,  vision/hearing changes, jaw pain/paresthesias, BUE pain/paresthesias/numbness/weakness, chest pain/pressure, palpitations, dyspnea, nausea, RUQ abdominal pain, oliguria/anuria, BLE edema.    Dyslipidemia  Patient and I discussed recent labs (see below; mixed DLD, well-controlled -- see media tab) and that ASCVD risk is increased based on most recent lipid panel, current blood pressure  (hypertensive, with medication), diabetes status (non-diabetic), and smoking status (non-smoker).    Patient and I then discussed necessary dietary changes to make to address dyslipidemia.  Patient is currently taking cholesterol lowering medication: pravastatin, ezetimibe.  Patient verbalized understanding.    ROS is NEGATIVE for dizziness, generalized weakness/fatigue, vision/hearing changes, jaw pain/paresthesias, BUE pain/paresthesias/numbness/weakness, chest pain/pressure, palpitations, dyspnea, RUQ abdominal pain, oliguria/anuria, BLE edema.    Benign prostatic hyperplasia  Chronic, stable, well controlled on current medication.    ROS is NEGATIVE for nocturia, polyuria, increased frequency of urination, urinary hesitancy, feeling of incomplete voiding, difficulty initiation urine stream, hematuria, pyuria    Vitamin D deficiency  Patient reports having had vitamin D deficiency past. Review of labs from 2016 reveals that he level that was barely under normal limits at 29. Normal is 30. Patient is advised to take vitamin D supplementation at 2000 international units by mouth daily.      Patient Active Problem List    Diagnosis Date Noted   • Vitamin D deficiency 05/03/2018   • Bilateral primary osteoarthritis of knee 05/03/2018   • Tinea pedis of both feet 05/03/2018   • Onychomycosis 05/03/2018   • Chronic use of opiate drugs therapeutic purposes 05/03/2018   • CAD s/p MVPCI 03/21/2018   • Benign prostatic hyperplasia 08/10/2016   • Cholelithiasis 04/19/2016   • Essential hypertension 05/28/2015   • Dyslipidemia 10/10/2014       Additional History:   Allergies:    Statins [hmg-coa-r inhibitors]     Current Medications:     Current Outpatient Prescriptions   Medication Sig Dispense Refill   • terbinafine (LAMISIL) 1 % cream Apply 1 Application to affected area(s) 2 times a day. 30 g 1   • Turmeric Curcumin 500 MG Cap Take 500 mg by mouth 3 times a day.     • HYDROcodone/acetaminophen (NORCO)  MG Tab Take  "0.5-1 Tabs by mouth 1 time daily as needed for up to 30 days. 30 Tab 0   • ezetimibe (ZETIA) 10 MG Tab Take 1 Tab by mouth every day. 90 Tab 3   • carvedilol (COREG) 12.5 MG Tab Take 1 Tab by mouth 2 times a day, with meals. 180 Tab 3   • Ticagrelor 60 MG Tab Take 60 mg by mouth 2 Times a Day. 180 Tab 3   • pravastatin (PRAVACHOL) 20 MG Tab Take 1 Tab by mouth every day. 90 Tab 3   • lisinopril (PRINIVIL) 5 MG Tab TAKE ONE TABLET BY MOUTH ONCE DAILY 90 Tab 2   • terazosin (HYTRIN) 10 MG capsule TAKE ONE CAPSULE BY MOUTH AT BEDTIME 90 Cap 0   • aspirin (ASA) 81 MG Chew Tab chewable tablet Take 81 mg by mouth every day.       No current facility-administered medications for this visit.         Social History:     Social History   Substance Use Topics   • Smoking status: Never Smoker   • Smokeless tobacco: Never Used   • Alcohol use No       ROS:     - NOTE: All other systems reviewed and are negative, except as in HPI.     Objective:   Physical Exam:    Vitals: Blood pressure 108/66, pulse (!) 53, temperature 37.3 °C (99.1 °F), height 1.702 m (5' 7\"), weight 71.2 kg (157 lb), SpO2 100 %.   BMI: Body mass index is 24.59 kg/m².   General/Constitutional: Vitals as above, Well nourished, well developed male in no acute distress   Head/Eyes: Head is grossly normal & atraumatic, bilateral conjunctivae clear and not injected, bilateral EOMI, bilateral PERRL   ENT: Bilateral external ears grossly normal in appearance, Hearing grossly intact, External nares normal in appearance and without discharge/bleeding   Respiratory: No respiratory distress, bilateral lungs are clear to ausculation in all lung fields (anterior/lateral/posterior), no wheezing/rhonchi/rales   Cardiovascular: Regular rate and rhythm without murmur/gallops/rubs, distal pulses are intact and equal bilaterally (radial, posterior tibial), no bilateral lower extremity edema   MSK: Significant crepitus in bilateral knees on passive range of motion with tenderness " to medial and lateral joint spaces as well as medial lateral collateral ligaments, Gait grossly normal & not antalgic   Integumentary: Patient with thickened soles of feet bilaterally with crackles indicative of atypical tinea pedis with the above discoloration of the skin and without any other signs of ulceration nor drainage, no evidence of skin involvement between his toes however or on the tops of both feet, patient with all of his toenails that are thickened and yellow, approximately 2mm, No apparent rashes   Psych: Judgment grossly appropriate, no apparent depression/anxiety    Health Maintenance:     - Patient declines colonoscopy, vaccinations    Imaging/Labs:     -Reviewed and interpreted as in history of present illness above    Assessment and Plan:   1. Encounter to establish care with new doctor  Patient transferring primary medical care from [facility].    2. Chronic use of opiate drugs therapeutic purposes  3. Bilateral primary osteoarthritis of knee  Chronic, uncontrolled, patient strongly advised to go to orthopedics for another consultation regarding bilateral total knee arthroplasties. In the meantime, patient can reduce inflammation with daily turmeric, so that he can decrease his dependence on Norco. Patient strongly advised that Norco is not a long-term solution, and he should pursue bilateral knee replacements within the same year, preferably, other than in separate years to reduce financial burden that is concerned. Consent for opiate prescription as well as [facility] drug screen per [facility] controlled substance policy   - REFERRAL TO ORTHOPEDICS   - HYDROcodone/acetaminophen (NORCO)  MG Tab; Take 0.5-1 Tabs by mouth 1 time daily as needed for up to 30 days.  Dispense: 30 Tab; Refill: 0   - CONSENT FOR OPIATE PRESCRIPTION   - [facility] SCREEN; Future   - Turmeric Curcumin 500 MG Cap; Take 500 mg by mouth 3 times a day.    4. Tinea pedis of both feet  5.  Onychomycosis  New problem, uncontrolled.  Patient to trial topical terbinafine, and referral to podiatry for further evaluation and management. Patient declines oral terbinafine therapy at present.   - REFERRAL TO PODIATRY   - terbinafine (LAMISIL) 1 % cream; Apply 1 Application to affected area(s) 2 times a day.  Dispense: 30 g; Refill: 1    6. CAD s/p MVPCI  7. Essential hypertension  8. Dyslipidemia  All of these conditions are chronic, stable, well controlled. Continue current medications as prescribed.    9. Benign prostatic hyperplasia, unspecified whether lower urinary tract symptoms present  10. Prostate cancer screening  BPH is Chronic, stable, well controlled. PSA to evaluate for screening of prostate cancer, as well as to detect if BPH could be worsening anatomically but not physiologically.   - PROSTATE SPECIFIC AG SCREENING; Future    11. Vitamin D deficiency  Chronic, unknown control, patient advised to use vitamin D supplementation as listed in history of present illness above.    12. Medicare annual wellness visit, subsequent   - PROSTATE SPECIFIC AG SCREENING; Future        RTC: in 2months for [facility] Annual Wellness Visit.    PLEASE NOTE: This dictation was created using voice recognition software. I have made every reasonable attempt to correct obvious errors, but I expect that there are errors of grammar and possibly content that I did not discover before finalizing the note.

## 2018-05-07 NOTE — ASSESSMENT & PLAN NOTE
Patient states he has some sort of dermatitis of the bilateral soles of both his feet. Patient applies some sort of agent which dries out the bottoms of his feet.  Patient denies any ulcerations, purulent or serous drainage, no bloody drainage. Patient without known history for diabetes.    ROS is NEGATIVE for generalized weakness/fatigue, generalized pruritis, jaundice, RUQ pain.      Patient denies binge or regular&heavy alcohol drinking behaviors, denies IV drug use, denies recent sexual intercourse with new partners.

## 2018-05-07 NOTE — ASSESSMENT & PLAN NOTE
Patient states that he has increased thickness and discoloration of his toenails. This is likely related to his probable fungal dermatitis of the soles of both his feet.

## 2018-05-07 NOTE — ASSESSMENT & PLAN NOTE
Chronic, stable, well controlled on current medication.    ROS is NEGATIVE for nocturia, polyuria, increased frequency of urination, urinary hesitancy, feeling of incomplete voiding, difficulty initiation urine stream, hematuria, pyuria

## 2018-06-21 ENCOUNTER — HOSPITAL ENCOUNTER (OUTPATIENT)
Dept: CARDIOLOGY | Facility: MEDICAL CENTER | Age: 78
End: 2018-06-21
Attending: INTERNAL MEDICINE
Payer: MEDICARE

## 2018-06-21 ENCOUNTER — HOSPITAL ENCOUNTER (OUTPATIENT)
Dept: LAB | Facility: MEDICAL CENTER | Age: 78
End: 2018-06-21
Attending: INTERNAL MEDICINE
Payer: MEDICARE

## 2018-06-21 DIAGNOSIS — I25.10 CORONARY ARTERY DISEASE INVOLVING NATIVE CORONARY ARTERY OF NATIVE HEART WITHOUT ANGINA PECTORIS: ICD-10-CM

## 2018-06-21 DIAGNOSIS — E78.5 DYSLIPIDEMIA: ICD-10-CM

## 2018-06-21 DIAGNOSIS — I25.5 ISCHEMIC CARDIOMYOPATHY: ICD-10-CM

## 2018-06-21 LAB
ALBUMIN SERPL BCP-MCNC: 4.1 G/DL (ref 3.2–4.9)
ALBUMIN/GLOB SERPL: 1.8 G/DL
ALP SERPL-CCNC: 40 U/L (ref 30–99)
ALT SERPL-CCNC: 16 U/L (ref 2–50)
ANION GAP SERPL CALC-SCNC: 8 MMOL/L (ref 0–11.9)
AST SERPL-CCNC: 18 U/L (ref 12–45)
BILIRUB SERPL-MCNC: 0.8 MG/DL (ref 0.1–1.5)
BUN SERPL-MCNC: 17 MG/DL (ref 8–22)
CALCIUM SERPL-MCNC: 8.8 MG/DL (ref 8.5–10.5)
CHLORIDE SERPL-SCNC: 106 MMOL/L (ref 96–112)
CHOLEST SERPL-MCNC: 174 MG/DL (ref 100–199)
CO2 SERPL-SCNC: 26 MMOL/L (ref 20–33)
CREAT SERPL-MCNC: 1.34 MG/DL (ref 0.5–1.4)
GLOBULIN SER CALC-MCNC: 2.3 G/DL (ref 1.9–3.5)
GLUCOSE SERPL-MCNC: 93 MG/DL (ref 65–99)
HDLC SERPL-MCNC: 60 MG/DL
LDLC SERPL CALC-MCNC: 101 MG/DL
LV EJECT FRACT  99904: 60
LV EJECT FRACT MOD 2C 99903: 71.57
LV EJECT FRACT MOD 4C 99902: 57.03
LV EJECT FRACT MOD BP 99901: 61.47
POTASSIUM SERPL-SCNC: 4 MMOL/L (ref 3.6–5.5)
PROT SERPL-MCNC: 6.4 G/DL (ref 6–8.2)
SODIUM SERPL-SCNC: 140 MMOL/L (ref 135–145)
TRIGL SERPL-MCNC: 63 MG/DL (ref 0–149)

## 2018-06-21 PROCEDURE — 93306 TTE W/DOPPLER COMPLETE: CPT

## 2018-06-21 PROCEDURE — 80053 COMPREHEN METABOLIC PANEL: CPT

## 2018-06-21 PROCEDURE — 80061 LIPID PANEL: CPT

## 2018-06-21 PROCEDURE — 36415 COLL VENOUS BLD VENIPUNCTURE: CPT

## 2018-06-26 ENCOUNTER — TELEPHONE (OUTPATIENT)
Dept: CARDIOLOGY | Facility: MEDICAL CENTER | Age: 78
End: 2018-06-26

## 2018-06-26 ENCOUNTER — OFFICE VISIT (OUTPATIENT)
Dept: CARDIOLOGY | Facility: MEDICAL CENTER | Age: 78
End: 2018-06-26
Payer: MEDICARE

## 2018-06-26 VITALS
WEIGHT: 156 LBS | OXYGEN SATURATION: 95 % | SYSTOLIC BLOOD PRESSURE: 118 MMHG | DIASTOLIC BLOOD PRESSURE: 62 MMHG | BODY MASS INDEX: 24.48 KG/M2 | HEIGHT: 67 IN | HEART RATE: 60 BPM

## 2018-06-26 DIAGNOSIS — E78.5 DYSLIPIDEMIA: ICD-10-CM

## 2018-06-26 DIAGNOSIS — I35.1 NONRHEUMATIC AORTIC VALVE INSUFFICIENCY: ICD-10-CM

## 2018-06-26 DIAGNOSIS — I10 ESSENTIAL HYPERTENSION: ICD-10-CM

## 2018-06-26 DIAGNOSIS — Z78.9 STATIN INTOLERANCE: ICD-10-CM

## 2018-06-26 DIAGNOSIS — I25.10 CORONARY ARTERY DISEASE INVOLVING NATIVE CORONARY ARTERY OF NATIVE HEART WITHOUT ANGINA PECTORIS: ICD-10-CM

## 2018-06-26 PROCEDURE — 99213 OFFICE O/P EST LOW 20 MIN: CPT | Performed by: INTERNAL MEDICINE

## 2018-06-26 NOTE — PROGRESS NOTES
Chief Complaint   Patient presents with   • Coronary Artery Disease       Subjective:   Arcenio Barnett is a 78 y.o. male who presents today for follow-up of coronary artery disease.  History of CAD status post PCI LAD and RCA 2007 with subsequent in-stent restenosis of the LAD 6/2016 with a new ABHISHEK placed. He's been maintained on aspirin and Brilinta now low-dose, beta blocker and Ace. There was confusion with his medical regimen and he is currently taking Toprol-XL and Coreg. Blood pressure is suboptimal. Has not had recent lab tests.     In the interim he was tried on another statin and discontinued it due to myalgias.  This makes to statin that he was intolerant to.  No cardiac symptoms.  Medical therapy is good.  Blood pressure is well.  Lipid profile is fair and he is tolerating Zetia only.  It is suboptimal for his coronary artery disease however.    Denies any other cardiovascular symptoms including shortness of breath, dyspnea on exertion, lightheadedness, syncope or presyncope, lower extremity edema, PND, orthopnea or palpitations.      Past Medical History:   Diagnosis Date   • Allergy    • CAD (coronary artery disease)     cardiac stents x 2 2000, 2007 [place], [place], [place] 2007 prox LAD   • Cholelithiasis    • History of bladder infections    • History of coronary artery stent placement 8/1/2014    Prox LAD, also RCA   • Hyperlipidemia    • Hypertension    • Statin intolerance 6/26/2018     History reviewed. No pertinent surgical history.  Family History   Problem Relation Age of Onset   • Cancer Father 57     lung cancer   • Cancer Brother 52     lung cancer   • Cancer Sister      breast   • Cancer Sister      breast   • Cancer Sister      pancreatic   • Hyperlipidemia Mother    • Stroke Mother      Social History     Social History   • Marital status:      Spouse name: N/A   • Number of children: N/A   • Years of education: N/A     Occupational History   • Not on file.     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      Outpatient Encounter Prescriptions as of 6/26/2018   Medication Sig Dispense Refill   • terbinafine (LAMISIL) 1 % cream Apply 1 Application to affected area(s) 2 times a day. 30 g 1   • ezetimibe (ZETIA) 10 MG Tab Take 1 Tab by mouth every day. 90 Tab 3   • carvedilol (COREG) 12.5 MG Tab Take 1 Tab by mouth 2 times a day, with meals. 180 Tab 3   • Ticagrelor 60 MG Tab Take 60 mg by mouth 2 Times a Day. 180 Tab 3   • lisinopril (PRINIVIL) 5 MG Tab TAKE ONE TABLET BY MOUTH ONCE DAILY 90 Tab 2   • terazosin (HYTRIN) 10 MG capsule TAKE ONE CAPSULE BY MOUTH AT BEDTIME 90 Cap 0   • aspirin (ASA) 81 MG Chew Tab chewable tablet Take 81 mg by mouth every day.     • [DISCONTINUED] Turmeric Curcumin 500 MG Cap Take 500 mg by mouth 3 times a day.     • [DISCONTINUED] pravastatin (PRAVACHOL) 20 MG Tab Take 1 Tab by mouth every day. 90 Tab 3     No facility-administered encounter medications on file as of 6/26/2018.      Review of Systems   All other systems reviewed and are negative.       Objective:   /62   Pulse 60   Ht 1.702 m (5' 7\")   Wt 70.8 kg (156 lb)   SpO2 95%   BMI 24.43 kg/m²     Physical Exam   Constitutional: He is oriented to person, place, and time. He appears well-developed and well-nourished. No distress.   HENT:   Head: Normocephalic and atraumatic.   Right Ear: External ear normal.   Left Ear: External ear normal.   Eyes: Conjunctivae and EOM are normal. Pupils are equal, round, and reactive to light. Right eye exhibits no discharge. Left eye exhibits no discharge. No scleral icterus.   Neck: Normal range of motion. Neck supple. No JVD present. No tracheal " deviation present. No thyromegaly present.   Cardiovascular: Normal rate, regular rhythm and intact distal pulses.  PMI is not displaced.  Exam reveals no gallop and no friction rub.    No murmur heard.  Pulses:       Carotid pulses are 2+ on the right side, and 2+ on the left side.       Radial pulses are 2+ on the left side.        Popliteal pulses are 2+ on the right side, and 2+ on the left side.        Dorsalis pedis pulses are 2+ on the right side, and 2+ on the left side.        Posterior tibial pulses are 2+ on the right side, and 2+ on the left side.   Pulmonary/Chest: Effort normal and breath sounds normal. No respiratory distress. He has no wheezes. He has no rales. He exhibits no tenderness.   Abdominal: Soft. Bowel sounds are normal. He exhibits no distension. There is no tenderness.   Musculoskeletal: Normal range of motion. He exhibits no edema, tenderness or deformity.   Neurological: He is alert and oriented to person, place, and time. No cranial nerve deficit (cranial nerves II through XII grossly intact). Coordination normal.   Skin: Skin is warm and dry. No rash noted. He is not diaphoretic. No erythema. No pallor.   Psychiatric: He has a normal mood and affect. His behavior is normal. Thought content normal.   Vitals reviewed.    LABS:  Lab Results   Component Value Date/Time    CHOLSTRLTOT 174 06/21/2018 10:51 AM     (H) 06/21/2018 10:51 AM    HDL 60 06/21/2018 10:51 AM    TRIGLYCERIDE 63 06/21/2018 10:51 AM       Lab Results   Component Value Date/Time    WBC 5.2 05/03/2016 08:08 AM    RBC 4.87 05/03/2016 08:08 AM    HEMOGLOBIN 14.8 05/03/2016 08:08 AM    HEMATOCRIT 45.4 05/03/2016 08:08 AM    MCV 93.2 05/03/2016 08:08 AM    NEUTSPOLYS 52.90 05/03/2016 08:08 AM    LYMPHOCYTES 23.10 05/03/2016 08:08 AM    MONOCYTES 11.00 05/03/2016 08:08 AM    EOSINOPHILS 11.60 (H) 05/03/2016 08:08 AM    BASOPHILS 1.20 05/03/2016 08:08 AM     Lab Results   Component Value Date/Time    SODIUM 140  06/21/2018 10:51 AM    POTASSIUM 4.0 06/21/2018 10:51 AM    CHLORIDE 106 06/21/2018 10:51 AM    CO2 26 06/21/2018 10:51 AM    GLUCOSE 93 06/21/2018 10:51 AM    BUN 17 06/21/2018 10:51 AM    CREATININE 1.34 06/21/2018 10:51 AM         Lab Results   Component Value Date/Time    ALKPHOSPHAT 40 06/21/2018 10:51 AM    ASTSGOT 18 06/21/2018 10:51 AM    ALTSGPT 16 06/21/2018 10:51 AM    TBILIRUBIN 0.8 06/21/2018 10:51 AM      ECHO CONCLUSIONS (6/21/2018):  Prior echo 4/8/16  Normal left ventricular chamber size.  Left ventricular ejection fraction is visually estimated to be 60%.  Aortic sclerosis without stenosis.  Mild-moderate aortic insufficiency. -516 ms.  Compared to the report of the prior study done - there has been a   slight progression in aortic insufficiency.      Assessment:     1. Essential hypertension     2. CAD s/p MVPCI  REFERRAL TO LIPID CLINIC   3. Dyslipidemia  REFERRAL TO LIPID CLINIC   4. Statin intolerance  REFERRAL TO LIPID CLINIC   5. Nonrheumatic aortic valve insufficiency         Medical Decision Making:  Today's Assessment / Status / Plan:     He is doing well.  Echocardiogram looks good with an EF 60% and mild to moderate aortic insufficiency.  He has not tolerated several statins.  He is tolerating Zetia.  I think he needs PCS K9 therapy.  Recommend that he see the lipid clinic for this.  Otherwise continue his medications as his blood pressure is now optimal and his cardiac medications are otherwise in order.    1.  Referral to lipid clinic    FU1Y

## 2018-06-26 NOTE — TELEPHONE ENCOUNTER
The referral to Lipid Clinic will be sent to [facility].  The contact number is [phone].

## 2018-07-10 ENCOUNTER — OFFICE VISIT (OUTPATIENT)
Dept: MEDICAL GROUP | Facility: MEDICAL CENTER | Age: 78
End: 2018-07-10
Payer: MEDICARE

## 2018-07-10 VITALS
OXYGEN SATURATION: 97 % | HEART RATE: 56 BPM | WEIGHT: 158 LBS | DIASTOLIC BLOOD PRESSURE: 60 MMHG | BODY MASS INDEX: 24.8 KG/M2 | SYSTOLIC BLOOD PRESSURE: 92 MMHG | HEIGHT: 67 IN | TEMPERATURE: 98.6 F

## 2018-07-10 DIAGNOSIS — I25.10 CORONARY ARTERY DISEASE INVOLVING NATIVE CORONARY ARTERY OF NATIVE HEART WITHOUT ANGINA PECTORIS: ICD-10-CM

## 2018-07-10 DIAGNOSIS — Z90.49 HX OF CHOLECYSTECTOMY: ICD-10-CM

## 2018-07-10 DIAGNOSIS — B35.1 ONYCHOMYCOSIS: ICD-10-CM

## 2018-07-10 DIAGNOSIS — Z78.9 STATIN INTOLERANCE: ICD-10-CM

## 2018-07-10 DIAGNOSIS — E78.5 DYSLIPIDEMIA: ICD-10-CM

## 2018-07-10 DIAGNOSIS — M17.0 BILATERAL PRIMARY OSTEOARTHRITIS OF KNEE: ICD-10-CM

## 2018-07-10 PROCEDURE — 99215 OFFICE O/P EST HI 40 MIN: CPT | Performed by: FAMILY MEDICINE

## 2018-07-10 RX ORDER — TERBINAFINE HYDROCHLORIDE 250 MG/1
250 TABLET ORAL DAILY
Qty: 90 TAB | Refills: 0 | Status: SHIPPED | OUTPATIENT
Start: 2018-07-10 | End: 2018-12-11

## 2018-07-14 NOTE — ASSESSMENT & PLAN NOTE
Chronic, uncontrolled, minimally improved with use of Lamisil 1% cream which has been applying to his skin as well as toenails.  Patient's skin has improved significantly, however his toenails are still thickened with yellow discoloration.  Patient requesting further assistance.    Patient denies binge or regular&heavy alcohol drinking behaviors, denies IV drug use.    ROS is NEGATIVE for generalized weakness/fatigue, generalized pruritis, jaundice, RUQ pain.

## 2018-07-14 NOTE — ASSESSMENT & PLAN NOTE
Chronic, stable, well-controlled.  Unchanged since our last visit in May 2018.  Patient and I discussed that, since he states his back, it would be best for him to start pursuing intensive cardiac rehab, where he can learn how to better control his current and future pathophysiology of cardiovascular disease by medically supervised exercise as well as the Pritikin program.  Patient is in agreement.  Otherwise, continue present management with medication as directed.

## 2018-07-14 NOTE — PROGRESS NOTES
Subjective:   Chief Complaint/History of Present Illness:  Arcenio Barnett is a 78 y.o. male established patient who presents today to discuss medical problems as listed below    Diagnoses of Onychomycosis, CAD s/p MVPCI, Dyslipidemia, Statin intolerance, Bilateral primary osteoarthritis of knee, and H/O cholecystectomy were pertinent to this visit.    Onychomycosis  Chronic, uncontrolled, minimally improved with use of Lamisil 1% cream which has been applying to his skin as well as toenails.  Patient's skin has improved significantly, however his toenails are still thickened with yellow discoloration.  Patient requesting further assistance.    Patient denies binge or regular&heavy alcohol drinking behaviors, denies IV drug use.    ROS is NEGATIVE for generalized weakness/fatigue, generalized pruritis, jaundice, RUQ pain.       CAD s/p MVPCI  Chronic, stable, well-controlled.  Unchanged since our last visit in May 2018.  Patient and I discussed that, since he states his back, it would be best for him to start pursuing intensive cardiac rehab, where he can learn how to better control his current and future pathophysiology of cardiovascular disease by medically supervised exercise as well as the Pritikin program.  Patient is in agreement.  Otherwise, continue present management with medication as directed.    Dyslipidemia  Patient and I discussed recent labs (see below; mixed dyslipidemia, relatively well controlled, with minimal elevation of LDL to 101, however this is suboptimal for someone with history of CAD with stent placement, more appropriate to lower this to 80 or below.) and that ASCVD risk is increased based on most recent lipid panel, current blood pressure (hypertensive, with medication), diabetes status (non-diabetic), and smoking status (non-smoker).    Patient and I then discussed necessary dietary changes to make to address dyslipidemia.  Patient is currently taking cholesterol lowering medication:  Ezetimibe, patient has known side effects with statins, myalgias and arthralgias.  Patient verbalized understanding.    ROS is NEGATIVE for dizziness, generalized weakness/fatigue, vision/hearing changes, jaw pain/paresthesias, BUE pain/paresthesias/numbness/weakness, chest pain/pressure, palpitations, dyspnea, RUQ abdominal pain, oliguria/anuria, BLE edema.    Lab Results   Component Value Date/Time    CHOLSTRLTOT 174 06/21/2018 10:51 AM     (H) 06/21/2018 10:51 AM    HDL 60 06/21/2018 10:51 AM    TRIGLYCERIDE 63 06/21/2018 10:51 AM       Statin intolerance  patient has known side effects with statins, myalgias and arthralgias    H/O cholecystectomy  Chronic, stable, well-controlled, listed for historical purposes    Bilateral primary osteoarthritis of knee  Chronic, uncontrolled, patient states that this limits his ability to exercise.  However, patient informed that intensive cardiac rehab can allow him to use recumbent bicycles.  Therefore, patient can incorporate this is his cardiovascular exercise in addition to the other lifestyle changes that he will make as gained through the nutrition courses as well as the Pritikin diet.      Patient Active Problem List    Diagnosis Date Noted   • H/O cholecystectomy 07/10/2018   • Statin intolerance 06/26/2018   • Nonrheumatic aortic valve insufficiency 06/26/2018   • Vitamin D deficiency 05/03/2018   • Bilateral primary osteoarthritis of knee 05/03/2018   • Tinea pedis of both feet 05/03/2018   • Onychomycosis 05/03/2018   • Chronic use of opiate drugs therapeutic purposes 05/03/2018   • CAD s/p MVPCI 03/21/2018   • Benign prostatic hyperplasia 08/10/2016   • Essential hypertension 05/28/2015   • Dyslipidemia 10/10/2014       Additional History:   Allergies:    Statins [hmg-coa-r inhibitors]     Current Medications:     Current Outpatient Prescriptions   Medication Sig Dispense Refill   • terbinafine (LAMISIL) 250 MG Tab Take 1 Tab by mouth every day. 90 Tab 0  "  • ezetimibe (ZETIA) 10 MG Tab Take 1 Tab by mouth every day. 90 Tab 3   • carvedilol (COREG) 12.5 MG Tab Take 1 Tab by mouth 2 times a day, with meals. 180 Tab 3   • lisinopril (PRINIVIL) 5 MG Tab TAKE ONE TABLET BY MOUTH ONCE DAILY 90 Tab 2   • aspirin (ASA) 81 MG Chew Tab chewable tablet Take 81 mg by mouth every day.     • terbinafine (LAMISIL) 1 % cream Apply 1 Application to affected area(s) 2 times a day. 30 g 1   • Ticagrelor 60 MG Tab Take 60 mg by mouth 2 Times a Day. 180 Tab 3   • terazosin (HYTRIN) 10 MG capsule TAKE ONE CAPSULE BY MOUTH AT BEDTIME 90 Cap 0     No current facility-administered medications for this visit.         Social History:     Social History   Substance Use Topics   • Smoking status: Never Smoker   • Smokeless tobacco: Never Used   • Alcohol use No       ROS:     - NOTE: All other systems reviewed and are negative, except as in HPI.     Objective:   Physical Exam:    Vitals: Blood pressure (!) 92/60, pulse (!) 56, temperature 37 °C (98.6 °F), height 1.702 m (5' 7\"), weight 71.7 kg (158 lb), SpO2 97 %.   BMI: Body mass index is 24.75 kg/m².   General/Constitutional: Vitals as above, Well nourished, well developed male in no acute distress   Head/Eyes: Head is grossly normal & atraumatic, bilateral conjunctivae clear and not injected, bilateral EOMI, bilateral PERRL   ENT: Bilateral external ears grossly normal in appearance, Hearing grossly intact, External nares normal in appearance and without discharge/bleeding   Respiratory: No respiratory distress, bilateral lungs are clear to ausculation in all lung fields (anterior/lateral/posterior), no wheezing/rhonchi/rales   Cardiovascular: Regular rate and rhythm without murmur/gallops/rubs, distal pulses are intact and equal bilaterally (radial, posterior tibial), no bilateral lower extremity edema   MSK: Gait grossly normal & not antalgic   Integumentary: Patient with much improved tinea pedis but still with mild skin thickness and " discoloratoin, patient with all of his toenails that are thickened and yellow to approximately 2mm, No apparent rashes   Psych: Judgment grossly appropriate, no apparent depression/anxiety    Health Maintenance:     - UDS up to date    Imaging/Labs:     - 06/2018 -- CMP WNL    Assessment and Plan:   1. Onychomycosis  Chronic, uncontrolled, failure to be affected by topical terbinafine, therefore trial of oral terbinafine given the CMP is within normal limits.  LFTs to be rechecked in 1 month.  Patient verbalized understanding.   - terbinafine (LAMISIL) 250 MG Tab; Take 1 Tab by mouth every day.  Dispense: 90 Tab; Refill: 0   - HEPATIC FUNCTION PANEL; Future    2. CAD s/p MVPCI  Chronic, stable, well-controlled.  However, patient will benefit from referral to intensive cardiac rehab for reasons as stated in HPI above.   - LIPID PROFILE; Future   - REFERRAL TO INTENSIVE CARDIAC REHAB/CARDIAC REHAB    3. Dyslipidemia  4. Statin intolerance  Chronic, uncontrolled, this can be addressed through lifestyle changes as directed by the intensive cardiac rehab program.   - LIPID PROFILE; Future   - REFERRAL TO INTENSIVE CARDIAC REHAB/CARDIAC REHAB    5. Bilateral primary osteoarthritis of knee  Chronic, uncontrolled, this may limit his ability to participate with exercise.    6. H/O cholecystectomy  Chronic, stable, well-controlled.  Listed for surgical purposes.    NOTE: A total of 40minutes was spent in direct face-to-face time with the patient, of which over 50% of the time was spent in counseling and/or coordination of care as discussed in note above.      RTC: in 3 months for follow-up on onychomycosis, lifestyle changes.    PLEASE NOTE: This dictation was created using voice recognition software. I have made every reasonable attempt to correct obvious errors, but I expect that there are errors of grammar and possibly content that I did not discover before finalizing the note.

## 2018-07-14 NOTE — ASSESSMENT & PLAN NOTE
Patient and I discussed recent labs (see below; mixed dyslipidemia, relatively well controlled, with minimal elevation of LDL to 101, however this is suboptimal for someone with history of CAD with stent placement, more appropriate to lower this to 80 or below.) and that ASCVD risk is increased based on most recent lipid panel, current blood pressure (hypertensive, with medication), diabetes status (non-diabetic), and smoking status (non-smoker).    Patient and I then discussed necessary dietary changes to make to address dyslipidemia.  Patient is currently taking cholesterol lowering medication: Ezetimibe, patient has known side effects with statins, myalgias and arthralgias.  Patient verbalized understanding.    ROS is NEGATIVE for dizziness, generalized weakness/fatigue, vision/hearing changes, jaw pain/paresthesias, BUE pain/paresthesias/numbness/weakness, chest pain/pressure, palpitations, dyspnea, RUQ abdominal pain, oliguria/anuria, BLE edema.    Lab Results   Component Value Date/Time    CHOLSTRLTOT 174 06/21/2018 10:51 AM     (H) 06/21/2018 10:51 AM    HDL 60 06/21/2018 10:51 AM    TRIGLYCERIDE 63 06/21/2018 10:51 AM

## 2018-07-14 NOTE — ASSESSMENT & PLAN NOTE
Chronic, uncontrolled, patient states that this limits his ability to exercise.  However, patient informed that intensive cardiac rehab can allow him to use recumbent bicycles.  Therefore, patient can incorporate this is his cardiovascular exercise in addition to the other lifestyle changes that he will make as gained through the nutrition courses as well as the Pritikin diet.

## 2018-08-28 ENCOUNTER — TELEPHONE (OUTPATIENT)
Dept: CARDIOLOGY | Facility: MEDICAL CENTER | Age: 78
End: 2018-08-28

## 2018-08-28 NOTE — TELEPHONE ENCOUNTER
Called pt to follow up. Educated him on recommendations why Dr. Lopez wanted him to follow up with the lipid clinic. He states understanding. Provided pt with number to call to follow up with clinic: [phone]. Pt appreciative of information and denies further needs.     ----- Message from Maggie Mccarthy sent at 8/28/2018 12:15 PM PDT -----  Regarding: referral to Lipid Clinic  Contact: [phone]  MAYLIN/chantell    Pt calling to follow up on referral TW made on 6/26 to Lipid Clinic.      Pt states he has had no contact from anyone about this.      Please call Arcenio at .

## 2018-10-03 ENCOUNTER — TELEPHONE (OUTPATIENT)
Dept: MEDICAL GROUP | Facility: MEDICAL CENTER | Age: 78
End: 2018-10-03

## 2018-10-03 NOTE — TELEPHONE ENCOUNTER
ESTABLISHED PATIENT PRE-VISIT PLANNING     Note: Patient will not be contacted if there is no indication to call.     1.  Reviewed notes from the last few office visits within the medical group: Yes 07/10/2018    2.  If any orders were placed at last visit or intended to be done for this visit (i.e. 6 mos follow-up), do we have Results/Consult Notes?        •  Labs - Labs ordered, NOT completed. Patient advised to complete prior to next appointment.   Note: If patient appointment is for lab review and patient did not complete labs, check with provider if OK to reschedule patient until labs completed.       •  Imaging - Imaging was not ordered at last office visit.       •  Referrals - No referrals were ordered at last office visit.    3. Is this appointment scheduled as a Hospital Follow-Up? No    4.  Immunizations were updated in [facility] using [employer]?: Yes      5.  Patient is due for the following Health Maintenance Topics:   Health Maintenance Due   Topic Date Due   • IMM DTaP/Tdap/Td Vaccine (1 - Tdap) 01/01/1959   • IMM ZOSTER VACCINES (1 of 2) 01/01/1990   • IMM INFLUENZA (1) 09/01/2018           6.  MDX printed for Provider? YES    7.  Patient was informed to arrive 15 min prior to their scheduled appointment and bring in their medication bottles.    *Patient reminded to comp labs prior to appt

## 2018-10-09 ENCOUNTER — HOSPITAL ENCOUNTER (OUTPATIENT)
Dept: LAB | Facility: MEDICAL CENTER | Age: 78
End: 2018-10-09
Attending: FAMILY MEDICINE
Payer: MEDICARE

## 2018-10-09 DIAGNOSIS — B35.1 ONYCHOMYCOSIS: ICD-10-CM

## 2018-10-09 DIAGNOSIS — I25.10 CORONARY ARTERY DISEASE INVOLVING NATIVE CORONARY ARTERY OF NATIVE HEART WITHOUT ANGINA PECTORIS: ICD-10-CM

## 2018-10-09 DIAGNOSIS — E78.5 DYSLIPIDEMIA: ICD-10-CM

## 2018-10-09 LAB
ALBUMIN SERPL BCP-MCNC: 4.2 G/DL (ref 3.2–4.9)
ALP SERPL-CCNC: 45 U/L (ref 30–99)
ALT SERPL-CCNC: 20 U/L (ref 2–50)
AST SERPL-CCNC: 20 U/L (ref 12–45)
BILIRUB CONJ SERPL-MCNC: 0.2 MG/DL (ref 0.1–0.5)
BILIRUB INDIRECT SERPL-MCNC: 0.4 MG/DL (ref 0–1)
BILIRUB SERPL-MCNC: 0.6 MG/DL (ref 0.1–1.5)
CHOLEST SERPL-MCNC: 149 MG/DL (ref 100–199)
FASTING STATUS PATIENT QL REPORTED: NORMAL
HDLC SERPL-MCNC: 57 MG/DL
LDLC SERPL CALC-MCNC: 81 MG/DL
PROT SERPL-MCNC: 6.5 G/DL (ref 6–8.2)
TRIGL SERPL-MCNC: 57 MG/DL (ref 0–149)

## 2018-10-09 PROCEDURE — 36415 COLL VENOUS BLD VENIPUNCTURE: CPT

## 2018-10-09 PROCEDURE — 80076 HEPATIC FUNCTION PANEL: CPT

## 2018-10-09 PROCEDURE — 80061 LIPID PANEL: CPT

## 2018-10-10 ENCOUNTER — TELEPHONE (OUTPATIENT)
Dept: CARDIOLOGY | Facility: MEDICAL CENTER | Age: 78
End: 2018-10-10

## 2018-10-10 ENCOUNTER — APPOINTMENT (OUTPATIENT)
Dept: MEDICAL GROUP | Facility: MEDICAL CENTER | Age: 78
End: 2018-10-10
Payer: MEDICARE

## 2018-10-10 DIAGNOSIS — I25.5 ISCHEMIC CARDIOMYOPATHY: ICD-10-CM

## 2018-10-10 DIAGNOSIS — I25.10 CORONARY ARTERY DISEASE INVOLVING NATIVE CORONARY ARTERY OF NATIVE HEART WITHOUT ANGINA PECTORIS: ICD-10-CM

## 2018-10-10 NOTE — TELEPHONE ENCOUNTER
Called pt's wife and informed her that we only have about a weeks worth of samples available at the [facility]. She states that should cover them until they get their insurance fixed. Rx sent to [facility].

## 2018-10-10 NOTE — TELEPHONE ENCOUNTER
----- Message from Maggie Mccarthy sent at 10/10/2018 11:36 AM PDT -----  Regarding: Ticagrelor samples  Contact: [phone]  MAYLIN/willis    Pt's wife Linh Jimenez calling for samples of Ticagrelor 60 MG due to temporary social security insurance issue.  Please call Linh at cell# [phone].

## 2018-10-19 DIAGNOSIS — I25.10 CORONARY ARTERY DISEASE INVOLVING NATIVE CORONARY ARTERY OF NATIVE HEART WITHOUT ANGINA PECTORIS: ICD-10-CM

## 2018-10-19 DIAGNOSIS — I25.5 ISCHEMIC CARDIOMYOPATHY: ICD-10-CM

## 2018-10-22 RX ORDER — TICAGRELOR 60 MG/1
TABLET ORAL
Qty: 60 TAB | Refills: 0 | Status: SHIPPED | OUTPATIENT
Start: 2018-10-22 | End: 2018-12-19

## 2018-11-02 ENCOUNTER — TELEPHONE (OUTPATIENT)
Dept: MEDICAL GROUP | Facility: MEDICAL CENTER | Age: 78
End: 2018-11-02

## 2018-11-02 DIAGNOSIS — N40.0 BENIGN PROSTATIC HYPERPLASIA, UNSPECIFIED WHETHER LOWER URINARY TRACT SYMPTOMS PRESENT: ICD-10-CM

## 2018-11-02 RX ORDER — TERAZOSIN 10 MG/1
10 CAPSULE ORAL DAILY
Qty: 90 CAP | Refills: 0 | Status: SHIPPED | OUTPATIENT
Start: 2018-11-02 | End: 2018-11-05 | Stop reason: SDUPTHER

## 2018-11-05 DIAGNOSIS — N40.0 BENIGN PROSTATIC HYPERPLASIA, UNSPECIFIED WHETHER LOWER URINARY TRACT SYMPTOMS PRESENT: ICD-10-CM

## 2018-11-05 RX ORDER — TERAZOSIN 10 MG/1
10 CAPSULE ORAL DAILY
Qty: 90 CAP | Refills: 2 | Status: SHIPPED | OUTPATIENT
Start: 2018-11-05 | End: 2019-08-01 | Stop reason: SDUPTHER

## 2018-11-05 NOTE — TELEPHONE ENCOUNTER
Is that going to be the patient's preferred pharmacy, from now on? Also, please send this as a prescription refill request with change of pharmacy.

## 2018-11-05 NOTE — TELEPHONE ENCOUNTER
Dr. Naik patient is calling because theTerazosin 10 mg needs to be sent to [facility] [address] [place] [address]

## 2018-11-05 NOTE — TELEPHONE ENCOUNTER
Was the patient seen in the last year in this department? Yes    Does patient have an active prescription for medications requested? No     Received Request Via: Patient       Pt needs this medication to be sent to \[facility]\"" on [facility].       This is just for this medication.

## 2018-12-03 DIAGNOSIS — I25.10 CORONARY ARTERY DISEASE DUE TO CALCIFIED CORONARY LESION: ICD-10-CM

## 2018-12-03 DIAGNOSIS — I25.10 CORONARY ARTERY DISEASE INVOLVING NATIVE CORONARY ARTERY OF NATIVE HEART WITHOUT ANGINA PECTORIS: ICD-10-CM

## 2018-12-03 DIAGNOSIS — I25.84 CORONARY ARTERY DISEASE DUE TO CALCIFIED CORONARY LESION: ICD-10-CM

## 2018-12-03 DIAGNOSIS — I10 ESSENTIAL HYPERTENSION: ICD-10-CM

## 2018-12-03 RX ORDER — CARVEDILOL 12.5 MG/1
12.5 TABLET ORAL 2 TIMES DAILY WITH MEALS
Qty: 180 TAB | Refills: 2 | Status: SHIPPED | OUTPATIENT
Start: 2018-12-03 | End: 2019-10-02 | Stop reason: SDUPTHER

## 2018-12-03 RX ORDER — LISINOPRIL 5 MG/1
5 TABLET ORAL DAILY
Qty: 90 TAB | Refills: 2 | Status: SHIPPED | OUTPATIENT
Start: 2018-12-03 | End: 2019-10-02 | Stop reason: SDUPTHER

## 2018-12-11 ENCOUNTER — OFFICE VISIT (OUTPATIENT)
Dept: MEDICAL GROUP | Facility: MEDICAL CENTER | Age: 78
End: 2018-12-11
Payer: MEDICARE

## 2018-12-11 VITALS
DIASTOLIC BLOOD PRESSURE: 62 MMHG | BODY MASS INDEX: 25.39 KG/M2 | WEIGHT: 161.8 LBS | HEIGHT: 67 IN | OXYGEN SATURATION: 95 % | HEART RATE: 56 BPM | SYSTOLIC BLOOD PRESSURE: 102 MMHG | TEMPERATURE: 98.2 F

## 2018-12-11 DIAGNOSIS — E78.5 DYSLIPIDEMIA: ICD-10-CM

## 2018-12-11 DIAGNOSIS — Z98.890 H/O MENISCECTOMY OF RIGHT KNEE: ICD-10-CM

## 2018-12-11 DIAGNOSIS — M17.0 BILATERAL PRIMARY OSTEOARTHRITIS OF KNEE: ICD-10-CM

## 2018-12-11 DIAGNOSIS — Z23 NEED FOR INFLUENZA VACCINATION: ICD-10-CM

## 2018-12-11 DIAGNOSIS — I25.10 CORONARY ARTERY DISEASE INVOLVING NATIVE CORONARY ARTERY OF NATIVE HEART WITHOUT ANGINA PECTORIS: ICD-10-CM

## 2018-12-11 PROBLEM — Z79.891 CHRONIC USE OF OPIATE DRUGS THERAPEUTIC PURPOSES: Status: RESOLVED | Noted: 2018-05-03 | Resolved: 2018-12-11

## 2018-12-11 PROCEDURE — 90662 IIV NO PRSV INCREASED AG IM: CPT | Performed by: FAMILY MEDICINE

## 2018-12-11 PROCEDURE — G0008 ADMIN INFLUENZA VIRUS VAC: HCPCS | Performed by: FAMILY MEDICINE

## 2018-12-11 PROCEDURE — 99214 OFFICE O/P EST MOD 30 MIN: CPT | Mod: 25 | Performed by: FAMILY MEDICINE

## 2018-12-11 RX ORDER — PRAVASTATIN SODIUM 20 MG
20 TABLET ORAL DAILY
Qty: 90 TAB | Refills: 3 | COMMUNITY
Start: 2018-12-11 | End: 2019-03-06

## 2018-12-14 NOTE — ASSESSMENT & PLAN NOTE
Chronic, stable, well-controlled present time.  Patient is taking all his medications as directed.  Patient has had a history of multiple PCI's, but does not currently have any dyspnea on exertion or any other signs of angina.    ROS is NEGATIVE for dizziness, generalized weakness/fatigue, cold sweats,  vision/hearing changes, jaw pain/paresthesias, BUE pain/paresthesias/numbness/weakness, chest pain/pressure, palpitations, dyspnea, nausea, RUQ abdominal pain, oliguria/anuria, BLE edema.

## 2018-12-14 NOTE — PROGRESS NOTES
Subjective:   Chief Complaint/History of Present Illness:  Arcenio Barnett is a 78 y.o. male established patient who presents today to discuss medical problems as listed below\    Diagnoses of Bilateral primary osteoarthritis of knee, H/O meniscectomy of right knee, CAD s/p MVPCI, Dyslipidemia, and Need for influenza vaccination were pertinent to this visit.    Bilateral primary osteoarthritis of knee  Chronic, uncontrolled, worsening, with pain in right knee worse in the hands likely.  Of note, patient does report having had history of meniscectomy of right knee.    Pain described as chronic aching, that is worsened with ambulation, improved with rest.  Relation causes pain to feel more sharp.    Patient has not had recent injury, whether falls onto the knees, other direct blunt force trauma, twisting or pulling motion injuries.    Was negative for bilateral lower extremity radicular pain/numbness/weakness, bilateral footdrop.    H/O meniscectomy of right knee  Please see notes from same day service 12/11/2018 regarding bilateral primary osteoarthritis of knee.    CAD s/p MVPCI  Chronic, stable, well-controlled present time.  Patient is taking all his medications as directed.  Patient has had a history of multiple PCI's, but does not currently have any dyspnea on exertion or any other signs of angina.    ROS is NEGATIVE for dizziness, generalized weakness/fatigue, cold sweats,  vision/hearing changes, jaw pain/paresthesias, BUE pain/paresthesias/numbness/weakness, chest pain/pressure, palpitations, dyspnea, nausea, RUQ abdominal pain, oliguria/anuria, BLE edema.     Dyslipidemia  Patient and I discussed recent labs (see below; mixed dyslipidemia, well-controlled on pravastatin.).    Patient and I then discussed necessary dietary changes to make to address dyslipidemia, if you would like to come off of his statin medication.  Patient verbalized understanding.    Lab Results   Component Value Date/Time    CHOLSTRLTOT  "149 10/09/2018 10:10 AM    LDL 81 10/09/2018 10:10 AM    HDL 57 10/09/2018 10:10 AM    TRIGLYCERIDE 57 10/09/2018 10:10 AM          Patient Active Problem List    Diagnosis Date Noted   • H/O meniscectomy of right knee 12/11/2018   • H/O cholecystectomy 07/10/2018   • Statin intolerance 06/26/2018   • Nonrheumatic aortic valve insufficiency 06/26/2018   • Vitamin D deficiency 05/03/2018   • Bilateral primary osteoarthritis of knee 05/03/2018   • Tinea pedis of both feet 05/03/2018   • Onychomycosis 05/03/2018   • CAD s/p MVPCI 03/21/2018   • Benign prostatic hyperplasia 08/10/2016   • Essential hypertension 05/28/2015   • Dyslipidemia 10/10/2014       Additional History:   Allergies:    Statins [hmg-coa-r inhibitors]     Current Medications:     Current Outpatient Prescriptions   Medication Sig Dispense Refill   • pravastatin (PRAVACHOL) 20 MG Tab Take 1 Tab by mouth every day. 90 Tab 3   • diclofenac (SOLARAZE) 3 % gel 1 application twice daily as needed. 50 g 1   • carvedilol (COREG) 12.5 MG Tab Take 1 Tab by mouth 2 times a day, with meals. 180 Tab 2   • lisinopril (PRINIVIL) 5 MG Tab Take 1 Tab by mouth every day. 90 Tab 2   • terazosin (HYTRIN) 10 MG capsule Take 1 Cap by mouth every day. 90 Cap 2   • BRILINTA 60 MG Tab tablet TAKE 1 TABLET ORALLY 2 TIMES DAILY 60 Tab 0   • ezetimibe (ZETIA) 10 MG Tab Take 1 Tab by mouth every day. 90 Tab 3   • aspirin (ASA) 81 MG Chew Tab chewable tablet Take 81 mg by mouth every day.       No current facility-administered medications for this visit.         Social History:     Social History   Substance Use Topics   • Smoking status: Never Smoker   • Smokeless tobacco: Never Used   • Alcohol use No       ROS:     - NOTE: All other systems reviewed and are negative, except as in HPI.     Objective:   Physical Exam:    Vitals: Blood pressure 102/62, pulse (!) 56, temperature 36.8 °C (98.2 °F), height 1.702 m (5' 7.01\"), weight 73.4 kg (161 lb 12.8 oz), SpO2 95 %.   BMI: Body " mass index is 25.34 kg/m².   General/Constitutional: Vitals as above, Well nourished, well developed male in no acute distress   Head/Eyes: Head is grossly normal & atraumatic, bilateral conjunctivae clear and not injected, bilateral EOMI, bilateral PERRL   ENT: Bilateral external ears grossly normal in appearance, Hearing grossly intact, External nares normal in appearance and without discharge/bleeding   Respiratory: No respiratory distress, bilateral lungs are clear to ausculation in all lung fields (anterior/lateral/posterior), no wheezing/rhonchi/rales   Cardiovascular: Regular rate and rhythm without murmur/gallops/rubs, distal pulses are intact and equal bilaterally (radial, posterior tibial), no bilateral lower extremity edema   MSK: positive exam for crepitus of bilateral knees on passive & active range of motion (R>L), also for postive Chandana's on R knee with both varus & valgus forces, Gait antalgic   Integumentary: No apparent rashes   Psych: Judgment grossly appropriate, no apparent depression/anxiety    Health Maintenance:     - Agrees to influenza vaccine, will consider whether he wants to have pneumonia or Tdap.    Imaging/Labs:     - 10/09/18 -- Labs are normal (liver function, cholesterol levels)    Assessment and Plan:   1. Bilateral primary osteoarthritis of knee  2. H/O meniscectomy of right knee  Acute exacerbation chronic condition, uncontrolled, evaluate with x-rays, as below, as well as treat empirically with diclofenac gel.  Patient would also like to have consultation/evaluation by orthopedic surgeon.   - DX-KNEE COMPLETE 4+ RIGHT; Future   - diclofenac (SOLARAZE) 3 % gel; 1 application twice daily as needed.  Dispense: 50 g; Refill: 1   - REFERRAL TO ORTHOPEDICS    3. CAD s/p MVPCI  4. Dyslipidemia  Chronic, stable, well-controlled.  Continue taking medication as directed.   - pravastatin (PRAVACHOL) 20 MG Tab; Take 1 Tab by mouth every day.  Dispense: 90 Tab; Refill: 3    5. Need for  influenza vaccination   - INFLUENZA VACCINE, HIGH DOSE (65+ ONLY)        RTC: in 2months for [facility] Annual Wellness Visit.    PLEASE NOTE: This dictation was created using voice recognition software. I have made every reasonable attempt to correct obvious errors, but I expect that there are errors of grammar and possibly content that I did not discover before finalizing the note.

## 2018-12-14 NOTE — ASSESSMENT & PLAN NOTE
Patient and I discussed recent labs (see below; mixed dyslipidemia, well-controlled on pravastatin.).    Patient and I then discussed necessary dietary changes to make to address dyslipidemia, if you would like to come off of his statin medication.  Patient verbalized understanding.    Lab Results   Component Value Date/Time    CHOLSTRLTOT 149 10/09/2018 10:10 AM    LDL 81 10/09/2018 10:10 AM    HDL 57 10/09/2018 10:10 AM    TRIGLYCERIDE 57 10/09/2018 10:10 AM

## 2018-12-14 NOTE — ASSESSMENT & PLAN NOTE
Please see notes from same day service 12/11/2018 regarding bilateral primary osteoarthritis of knee.

## 2018-12-14 NOTE — ASSESSMENT & PLAN NOTE
Chronic, uncontrolled, worsening, with pain in right knee worse in the hands likely.  Of note, patient does report having had history of meniscectomy of right knee.    Pain described as chronic aching, that is worsened with ambulation, improved with rest.  Relation causes pain to feel more sharp.    Patient has not had recent injury, whether falls onto the knees, other direct blunt force trauma, twisting or pulling motion injuries.    Was negative for bilateral lower extremity radicular pain/numbness/weakness, bilateral footdrop.

## 2018-12-19 ENCOUNTER — OFFICE VISIT (OUTPATIENT)
Dept: CARDIOLOGY | Facility: MEDICAL CENTER | Age: 78
End: 2018-12-19
Payer: MEDICARE

## 2018-12-19 VITALS
BODY MASS INDEX: 24.8 KG/M2 | HEART RATE: 54 BPM | RESPIRATION RATE: 14 BRPM | DIASTOLIC BLOOD PRESSURE: 80 MMHG | HEIGHT: 67 IN | SYSTOLIC BLOOD PRESSURE: 140 MMHG | OXYGEN SATURATION: 97 % | WEIGHT: 158 LBS

## 2018-12-19 DIAGNOSIS — E78.5 DYSLIPIDEMIA: ICD-10-CM

## 2018-12-19 DIAGNOSIS — I25.10 CORONARY ARTERY DISEASE INVOLVING NATIVE CORONARY ARTERY OF NATIVE HEART WITHOUT ANGINA PECTORIS: ICD-10-CM

## 2018-12-19 DIAGNOSIS — I10 ESSENTIAL HYPERTENSION: ICD-10-CM

## 2018-12-19 DIAGNOSIS — I35.1 NONRHEUMATIC AORTIC VALVE INSUFFICIENCY: ICD-10-CM

## 2018-12-19 PROCEDURE — 99213 OFFICE O/P EST LOW 20 MIN: CPT | Performed by: INTERNAL MEDICINE

## 2018-12-19 NOTE — PROGRESS NOTES
Chief Complaint   Patient presents with   • Coronary Artery Disease       Subjective:   Arcenio Barnett is a 78 y.o. male who presents today for follow-up of coronary artery disease.  History of CAD status post PCI LAD and RCA 2007 with subsequent in-stent restenosis of the LAD 6/2016 with a new ABHISHEK placed. He's been maintained on aspirin and Brilinta now low-dose, beta blocker and Ace. There was confusion with his medical regimen and he is currently taking Toprol-XL and Coreg. Blood pressure is suboptimal. Has not had recent lab tests.     Currently he is tolerating his pravastatin and Zetia.  He has no current complaints.  No exertional chest pain or shortness of breath.  His other medications as well.    Denies any other cardiovascular symptoms including chest pain, shortness of breath, dyspnea on exertion, lightheadedness, syncope or presyncope, lower extremity edema, PND, orthopnea or palpitations.        Past Medical History:   Diagnosis Date   • Allergy    • CAD (coronary artery disease)     cardiac stents x 2 2000, 2007 [place], [place], [place] 2007 prox LAD   • Cholelithiasis    • History of bladder infections    • History of coronary artery stent placement 8/1/2014    Prox LAD, also RCA   • Hyperlipidemia    • Hypertension    • Statin intolerance 6/26/2018     History reviewed. No pertinent surgical history.  Family History   Problem Relation Age of Onset   • Cancer Father 57        lung cancer   • Cancer Brother 52        lung cancer   • Cancer Sister         breast   • Cancer Sister         breast   • Cancer Sister         pancreatic   • Hyperlipidemia Mother    • Stroke Mother      Social History     Social History   • Marital status:      Spouse name: N/A   • Number of children: N/A   • Years of education: N/A     Occupational History   • Not on file.     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      Outpatient Encounter Prescriptions as of 12/19/2018   Medication Sig Dispense Refill   • pravastatin (PRAVACHOL) 20 MG Tab Take 1 Tab by mouth every day. 90 Tab 3   • carvedilol (COREG) 12.5 MG Tab Take 1 Tab by mouth 2 times a day, with meals. 180 Tab 2   • lisinopril (PRINIVIL) 5 MG Tab Take 1 Tab by mouth every day. 90 Tab 2   • terazosin (HYTRIN) 10 MG capsule Take 1 Cap by mouth every day. 90 Cap 2   • ezetimibe (ZETIA) 10 MG Tab Take 1 Tab by mouth every day. 90 Tab 3   • aspirin (ASA) 81 MG Chew Tab chewable tablet Take 81 mg by mouth every day.     • diclofenac (SOLARAZE) 3 % gel 1 application twice daily as needed. (Patient not taking: Reported on 12/19/2018) 50 g 1   • [DISCONTINUED] BRILINTA 60 MG Tab tablet TAKE 1 TABLET ORALLY 2 TIMES DAILY 60 Tab 0     No facility-administered encounter medications on file as of 12/19/2018.      Review of Systems   All other systems reviewed and are negative.       Objective:   /80 (BP Location: Left arm, Patient Position: Sitting, BP Cuff Size: Adult)   Pulse (!) 54   Resp 14   Ht 1.702 m (5' 7\")   Wt 71.7 kg (158 lb)   SpO2 97%   BMI 24.75 kg/m²     Physical Exam   Constitutional: He is oriented to person, place, and time. He appears well-developed and well-nourished. No distress.   HENT:   Head: Normocephalic and atraumatic.   Right Ear: External ear normal.   Left Ear: External ear normal.   Eyes: Pupils are equal, round, and reactive to light. Conjunctivae and EOM are normal. Right eye exhibits no discharge. Left eye exhibits no discharge. No scleral icterus.   Neck: Normal range of motion. Neck supple. No JVD present. No tracheal deviation present. No thyromegaly present.   Cardiovascular: Normal rate, regular rhythm and intact " distal pulses.  PMI is not displaced.  Exam reveals no gallop and no friction rub.    Murmur ( 1/6 diastolic murmur) heard.  Pulses:       Carotid pulses are 2+ on the right side, and 2+ on the left side.       Radial pulses are 2+ on the left side.        Popliteal pulses are 2+ on the right side, and 2+ on the left side.        Dorsalis pedis pulses are 2+ on the right side, and 2+ on the left side.        Posterior tibial pulses are 2+ on the right side, and 2+ on the left side.   Pulmonary/Chest: Effort normal and breath sounds normal. No respiratory distress. He has no wheezes. He has no rales. He exhibits no tenderness.   Abdominal: Soft. Bowel sounds are normal. He exhibits no distension. There is no tenderness.   Musculoskeletal: Normal range of motion. He exhibits no edema, tenderness or deformity.   Neurological: He is alert and oriented to person, place, and time. No cranial nerve deficit (cranial nerves II through XII grossly intact). Coordination normal.   Skin: Skin is warm and dry. No rash noted. He is not diaphoretic. No erythema. No pallor.   Psychiatric: He has a normal mood and affect. His behavior is normal. Thought content normal.   Vitals reviewed.    LABS:  Lab Results   Component Value Date/Time    CHOLSTRLTOT 149 10/09/2018 10:10 AM    LDL 81 10/09/2018 10:10 AM    HDL 57 10/09/2018 10:10 AM    TRIGLYCERIDE 57 10/09/2018 10:10 AM       Lab Results   Component Value Date/Time    WBC 5.2 05/03/2016 08:08 AM    RBC 4.87 05/03/2016 08:08 AM    HEMOGLOBIN 14.8 05/03/2016 08:08 AM    HEMATOCRIT 45.4 05/03/2016 08:08 AM    MCV 93.2 05/03/2016 08:08 AM    NEUTSPOLYS 52.90 05/03/2016 08:08 AM    LYMPHOCYTES 23.10 05/03/2016 08:08 AM    MONOCYTES 11.00 05/03/2016 08:08 AM    EOSINOPHILS 11.60 (H) 05/03/2016 08:08 AM    BASOPHILS 1.20 05/03/2016 08:08 AM     Lab Results   Component Value Date/Time    SODIUM 140 06/21/2018 10:51 AM    POTASSIUM 4.0 06/21/2018 10:51 AM    CHLORIDE 106 06/21/2018 10:51 AM     CO2 26 06/21/2018 10:51 AM    GLUCOSE 93 06/21/2018 10:51 AM    BUN 17 06/21/2018 10:51 AM    CREATININE 1.34 06/21/2018 10:51 AM         Lab Results   Component Value Date/Time    ALKPHOSPHAT 45 10/09/2018 10:10 AM    ASTSGOT 20 10/09/2018 10:10 AM    ALTSGPT 20 10/09/2018 10:10 AM    TBILIRUBIN 0.6 10/09/2018 10:10 AM      ECHO CONCLUSIONS (6/21/2018):  Prior echo 4/8/16  Normal left ventricular chamber size.  Left ventricular ejection fraction is visually estimated to be 60%.  Aortic sclerosis without stenosis.  Mild-moderate aortic insufficiency. -516 ms.  Compared to the report of the prior study done - there has been a   slight progression in aortic insufficiency.      Assessment:     1. Nonrheumatic aortic valve insufficiency     2. CAD s/p MVPCI     3. Essential hypertension     4. Dyslipidemia         Medical Decision Making:  Today's Assessment / Status / Plan:     Doing well.  Echo was stable with regard to his aortic insufficiency and should be rechecked in a year.  Otherwise he is tolerating statin well.  He has body aches that did not change with statin discontinuation and are unlikely to be related.  Should he however become statin intolerant he has been referred to the lipid clinic for PC SK 9 inhibitor therapy.    FU1Y

## 2019-02-04 ENCOUNTER — TELEPHONE (OUTPATIENT)
Dept: MEDICAL GROUP | Facility: MEDICAL CENTER | Age: 79
End: 2019-02-04

## 2019-02-04 NOTE — TELEPHONE ENCOUNTER
ANNUAL WELLNESS VISIT PRE-VISIT PLANNING WITHOUT OUTREACH    1.  Reviewed note from last office visit with PCP: YES    2.  If any orders were placed at last visit, do we have Results/Consult Notes?   3.  Immunizations were updated in [facility] using WebIZ?: Yes       •  WebIZ Recommendations: PNEUMOVAX (PPSV23), TDAP and SHINGRIX (Shingles)        •  Is patient due for Tdap? YES. Patient was not notified of copay/out of pocket cost.       •  Is patient due for Shingrix? YES. Patient was not notified of copay/out of pocket cost.     4.  Patient is due for the following Health Maintenance Topics:   Health Maintenance Due   Topic Date Due   • IMM DTaP/Tdap/Td Vaccine (1 - Tdap) 01/01/1959   • IMM ZOSTER VACCINES (1 of 2) 01/01/1990   • IMM PNEUMOCOCCAL 65+ (ADULT) LOW/MEDIUM RISK SERIES (2 of 2 - PPSV23) 12/05/2018       5.  Reviewed/Updated the following with patient:       •   Preferred Pharmacy? YES       •   Preferred Lab? YES       •   Preferred Communication? YES       •   Allergies? YES       •   Medications? YES. Was Abstract Encounter opened and chart updated? YES       •   Social History? YES. Was Abstract Encounter opened and chart updated? YES       •   Family History (document living status of immediate family members and if + hx of  cancer, diabetes, hypertension, hyperlipidemia, heart attack, stroke) YES. Was Abstract Encounter opened and chart updated? YES    6.  Care Team Updated:       •   DME Company (gait device, O2, CPAP, etc.): NO       •   Other Specialists (eye doctor, derm, GYN, cardiology, endo, etc): YES    7. Orders for overdue Health Maintenance topics pended in Pre-Charting? NO    8.  Patient has the following Care Path diagnoses on Problem List:  NONE    9.  Patient was advised: “This is a free wellness visit. The provider will screen for medical conditions to help you stay healthy. If you have other concerns to address you may be asked to discuss these at a separate visit or there may be an  additional fee.”     10.  Patient was informed to arrive 15 min prior to their scheduled appointment and bring in their medication bottles.

## 2019-02-07 ENCOUNTER — TELEPHONE (OUTPATIENT)
Dept: MEDICAL GROUP | Facility: MEDICAL CENTER | Age: 79
End: 2019-02-07

## 2019-02-11 ENCOUNTER — APPOINTMENT (OUTPATIENT)
Dept: MEDICAL GROUP | Facility: MEDICAL CENTER | Age: 79
End: 2019-02-11
Payer: MEDICARE

## 2019-02-28 ENCOUNTER — TELEPHONE (OUTPATIENT)
Dept: MEDICAL GROUP | Facility: MEDICAL CENTER | Age: 79
End: 2019-02-28

## 2019-02-28 NOTE — TELEPHONE ENCOUNTER
Future Appointments       Provider Department Center    3/6/2019 10:00 AM Sukumar Naik M.D.; [facility]  [facility] [place]    6/14/2019 11:15 AM Kareem Lopez M.D. [facility]           ANNUAL WELLNESS VISIT PRE-VISIT PLANNING WITH OUTREACH    1.  If any orders were placed at last visit, do we have Results/Consult Notes?        •  Labs - Labs ordered, completed on 10/9/18 and results are in chart.  Note: If patient appointment is for lab review and patient did not complete labs, check with provider if OK to reschedule patient until labs completed.       •  Imaging - Imaging ordered, NOT completed. Patient advised to complete prior to next appointment.       •  Referrals - Referral ordered, patient has NOT been seen.    2.  Immunizations were updated in Epic using WebIZ?:Epic matches WebIZ       •  WebIZ Recommendations: PNEUMOVAX (PPSV23), TDAP and SHINGRIX (Shingles)       •  Is patient due for Tdap? YES. Patient was not notified of copay/out of pocket cost.       •  Is patient due for Shingrx? YES. Patient was not notified of copay/out of pocket cost.    3.  Patient has the following Care Path diagnoses on Problem List:  NONE    4.  Orders for overdue Health Maintenance topics pended in Pre-Charting? NO

## 2019-03-06 ENCOUNTER — OFFICE VISIT (OUTPATIENT)
Dept: MEDICAL GROUP | Facility: MEDICAL CENTER | Age: 79
End: 2019-03-06
Payer: MEDICARE

## 2019-03-06 VITALS
TEMPERATURE: 98 F | DIASTOLIC BLOOD PRESSURE: 62 MMHG | HEIGHT: 67 IN | BODY MASS INDEX: 25.3 KG/M2 | WEIGHT: 161.2 LBS | SYSTOLIC BLOOD PRESSURE: 120 MMHG

## 2019-03-06 DIAGNOSIS — Z78.9 STATIN INTOLERANCE: ICD-10-CM

## 2019-03-06 DIAGNOSIS — M17.0 BILATERAL PRIMARY OSTEOARTHRITIS OF KNEE: ICD-10-CM

## 2019-03-06 DIAGNOSIS — E55.9 VITAMIN D DEFICIENCY: ICD-10-CM

## 2019-03-06 DIAGNOSIS — L57.0 ACTINIC KERATOSIS: ICD-10-CM

## 2019-03-06 DIAGNOSIS — Z90.49 HX OF CHOLECYSTECTOMY: ICD-10-CM

## 2019-03-06 DIAGNOSIS — Z01.20 ENCOUNTER FOR DENTAL EXAMINATION: ICD-10-CM

## 2019-03-06 DIAGNOSIS — Z12.5 PROSTATE CANCER SCREENING: ICD-10-CM

## 2019-03-06 DIAGNOSIS — Z00.00 MEDICARE ANNUAL WELLNESS VISIT, SUBSEQUENT: Primary | ICD-10-CM

## 2019-03-06 DIAGNOSIS — I35.1 NONRHEUMATIC AORTIC VALVE INSUFFICIENCY: ICD-10-CM

## 2019-03-06 DIAGNOSIS — I10 ESSENTIAL HYPERTENSION: ICD-10-CM

## 2019-03-06 DIAGNOSIS — I25.10 CORONARY ARTERY DISEASE INVOLVING NATIVE CORONARY ARTERY OF NATIVE HEART WITHOUT ANGINA PECTORIS: ICD-10-CM

## 2019-03-06 DIAGNOSIS — Z23 NEED FOR VACCINATION: ICD-10-CM

## 2019-03-06 DIAGNOSIS — Z98.890 H/O MENISCECTOMY OF RIGHT KNEE: ICD-10-CM

## 2019-03-06 DIAGNOSIS — B35.3 TINEA PEDIS OF BOTH FEET: ICD-10-CM

## 2019-03-06 DIAGNOSIS — E78.5 DYSLIPIDEMIA: ICD-10-CM

## 2019-03-06 DIAGNOSIS — N40.0 BENIGN PROSTATIC HYPERPLASIA, UNSPECIFIED WHETHER LOWER URINARY TRACT SYMPTOMS PRESENT: ICD-10-CM

## 2019-03-06 DIAGNOSIS — B35.1 ONYCHOMYCOSIS: ICD-10-CM

## 2019-03-06 DIAGNOSIS — Z01.00 ENCOUNTER FOR ROUTINE EYE AND VISION EXAMINATION: ICD-10-CM

## 2019-03-06 PROCEDURE — G0439 PPPS, SUBSEQ VISIT: HCPCS | Performed by: FAMILY MEDICINE

## 2019-03-06 ASSESSMENT — ACTIVITIES OF DAILY LIVING (ADL): BATHING_REQUIRES_ASSISTANCE: 0

## 2019-03-06 ASSESSMENT — PATIENT HEALTH QUESTIONNAIRE - PHQ9
5. POOR APPETITE OR OVEREATING: 0 - NOT AT ALL
CLINICAL INTERPRETATION OF PHQ2 SCORE: 2
SUM OF ALL RESPONSES TO PHQ QUESTIONS 1-9: 7

## 2019-03-06 ASSESSMENT — ENCOUNTER SYMPTOMS: GENERAL WELL-BEING: GOOD

## 2019-03-06 NOTE — PROGRESS NOTES
Chief Complaint   Patient presents with   • Annual Exam         HPI:  Arcenio Barnett is a 79 y.o. here for Medicare Annual Wellness Visit     Actinic keratosis  New problem, uncontrolled, patient with new hyperkeratotic skin lesion on his left ear.  We discussed that this can be easily treated with cutaneous cryotherapy, and patient would like to do so at the next visit.    CAD s/p MVPCI  This problem is chronic, stable, and monitored appropriately by history/labs/imaging as needed, and is well-controlled on the current regimen.  Please continue current regimen    Nonrheumatic aortic valve insufficiency  This problem is chronic, stable, and monitored appropriately by history/labs/imaging as needed, and is well-controlled on the current regimen.  Please continue current regimen    Dyslipidemia  This problem is chronic, stable, and monitored appropriately by history/labs/imaging as needed, and is well-controlled on the current regimen.  Please continue current regimen    Statin intolerance  This problem is chronic, stable, and monitored appropriately by history/labs/imaging as needed, and is well-controlled on the current regimen.  Please continue current regimen    Essential hypertension  This problem is chronic, stable, and monitored appropriately by history/labs/imaging as needed, and is well-controlled on the current regimen.  Please continue current regimen    Benign prostatic hyperplasia  This problem is chronic, stable, and monitored appropriately by history/labs/imaging as needed, and is well-controlled on the current regimen.  Please continue current regimen    Bilateral primary osteoarthritis of knee  This problem is chronic, stable, and monitored appropriately by history/labs/imaging as needed, and is well-controlled on the current regimen.  Please continue current regimen    H/O meniscectomy of right knee  This problem is chronic, stable, and monitored appropriately by history/labs/imaging as needed, and  is well-controlled on the current regimen.  Please continue current regimen    Onychomycosis  Chronic, uncontrolled, improving.  Patient continues with home therapy.    Tinea pedis of both feet  Resolved, per patient.    Vitamin D deficiency  This problem is chronic, stable, and monitored appropriately by history/labs/imaging as needed, and is well-controlled on the current regimen.  Please continue current regimen    H/O cholecystectomy  This problem is chronic, stable, and monitored appropriately by history/labs/imaging as needed, and is well-controlled on the current regimen.  Please continue current regimen      Patient Active Problem List    Diagnosis Date Noted   • Actinic keratosis 03/06/2019   • H/O meniscectomy of right knee 12/11/2018   • H/O cholecystectomy 07/10/2018   • Statin intolerance 06/26/2018   • Nonrheumatic aortic valve insufficiency 06/26/2018   • Vitamin D deficiency 05/03/2018   • Bilateral primary osteoarthritis of knee 05/03/2018   • Onychomycosis 05/03/2018   • CAD s/p MVPCI 03/21/2018   • Benign prostatic hyperplasia 08/10/2016   • Essential hypertension 05/28/2015   • Dyslipidemia 10/10/2014       Current Outpatient Prescriptions   Medication Sig Dispense Refill   • pneumococcal vaccine (PNEUMOVAX-23) 25 MCG/0.5ML Injection 0.5 mL by Intramuscular route Once PRN for up to 1 dose. 1 Vial 0   • tetanus-dipth-acell pertussis (ADACEL) 5-2-15.5 LF-MCG/0.5 Suspension 0.5 mL by Intramuscular route Once PRN for up to 1 dose. 0.5 mL 0   • carvedilol (COREG) 12.5 MG Tab Take 1 Tab by mouth 2 times a day, with meals. 180 Tab 2   • lisinopril (PRINIVIL) 5 MG Tab Take 1 Tab by mouth every day. 90 Tab 2   • terazosin (HYTRIN) 10 MG capsule Take 1 Cap by mouth every day. 90 Cap 2   • ezetimibe (ZETIA) 10 MG Tab Take 1 Tab by mouth every day. 90 Tab 3   • aspirin (ASA) 81 MG Chew Tab chewable tablet Take 81 mg by mouth every day.       No current facility-administered medications for this visit.              Current supplements as per medication list.       Allergies: Statins [hmg-coa-r inhibitors]    Current social contact/activities:      - Is      - States that he doesn't have friends in [place]      - Doesn't actively participate in his hobbies    He  reports that he has never smoked. He has never used smokeless tobacco. He reports that he does not drink alcohol or use drugs.  Counseling given: Not Answered      DPA/Advanced Directive:  Patient has Advanced Directive, but it is not on file. Instructed to bring in a copy to scan into their chart.    ROS:    Gait: Uses no assistive device  Ostomy: No  Other tubes: No  Amputations: No  Chronic oxygen use: No  Last eye exam: 3years ago  Wears hearing aids: No   : Denies any urinary leakage during the last 6 months    Screening:    Depression Screening    Little interest or pleasure in doing things?  1 - several days  Feeling down, depressed , or hopeless? 1 - several days  Trouble falling or staying asleep, or sleeping too much?  3 - nearly every day  Feeling tired or having little energy?  1 - several days  Poor appetite or overeating?  0 - not at all  Feeling bad about yourself - or that you are a failure or have let yourself or your family down? 0 - not at all  Trouble concentrating on things, such as reading the newspaper or watching television? 1 - several days  Moving or speaking so slowly that other people could have noticed.  Or the opposite - being so fidgety or restless that you have been moving around a lot more than usual?  0 - not at all  Thoughts that you would be better off dead, or of hurting yourself?  0 - not at all  Patient Health Questionnaire Score: 7    If depressive symptoms identified deferred to follow up visit unless specifically addressed in assessment and plan.    Interpretation of PHQ-9 Total Score   Score Severity   1-4 No Depression   5-9 Mild Depression   10-14 Moderate Depression   15-19 Moderately Severe Depression   20-27  Severe Depression      Screening for Cognitive Impairment    Three Minute Recall (leader, season, table) 3/3    [place] clock face with all 12 numbers and set the hands to show 10 past 11.  No    Cognitive concerns identified deferred for follow up unless specifically addressed in assessment and plan.    Fall Risk Assessment    Has the patient had two or more falls in the last year or any fall with injury in the last year?  No    Safety Assessment    Throw rugs on floor.  Yes  Handrails on all stairs.  Yes  Good lighting in all hallways.  Yes  Difficulty hearing.  Yes  Patient counseled about all safety risks that were identified.    Functional Assessment ADLs    Are there any barriers preventing you from cooking for yourself or meeting nutritional needs?  No.    Are there any barriers preventing you from driving safely or obtaining transportation?  No.    Are there any barriers preventing you from using a telephone or calling for help?  No.    Are there any barriers preventing you from shopping?  No.    Are there any barriers preventing you from taking care of your own finances?  No.    Are there any barriers preventing you from managing your medications?  No.    Are there any barriers preventing you from showering, bathing or dressing yourself? No.    Are you currently engaging in any exercise or physical activity?  No.     What is your perception of your health?  Good.      Health Maintenance Summary                IMM DTaP/Tdap/Td Vaccine Overdue 1/1/1959     IMM ZOSTER VACCINES Overdue 1/1/1990     IMM PNEUMOCOCCAL 65+ (ADULT) LOW/MEDIUM RISK SERIES Overdue 12/5/2018      Done 12/5/2017 Imm Admin: Pneumococcal Conjugate Vaccine (Prevnar/PCV-13)    Annual Wellness Visit Next Due 3/6/2020      Done 3/6/2019 SUBSEQUENT ANNUAL WELLNESS VISIT-INCLUDES PPPS ()     Patient has more history with this topic...    COLONOSCOPY Next Due 10/16/2025      Patient Declined 10/16/2015 pt declined          Patient Care  "Team:  Sukumar Naik M.D. as PCP - General (Family Medicine)  Sukumar Chisholm M.D. as Consulting Physician (Orthopaedics)  Kareem Lopez M.D. as Consulting Physician (Interventional Cardiology)      Social History   Substance Use Topics   • Smoking status: Never Smoker   • Smokeless tobacco: Never Used   • Alcohol use No     Family History   Problem Relation Age of Onset   • Cancer Father 57        lung cancer   • Cancer Brother 52        lung cancer   • Cancer Sister         breast   • Cancer Sister         breast   • Cancer Sister         pancreatic   • Hyperlipidemia Mother    • Stroke Mother      He  has a past medical history of Allergy; CAD (coronary artery disease); Cholelithiasis; History of bladder infections; History of coronary artery stent placement (8/1/2014); Hyperlipidemia; Hypertension; and Statin intolerance (6/26/2018).   No past surgical history on file.    Exam:   Blood pressure 120/62, temperature 36.7 °C (98 °F), height 1.702 m (5' 7.01\"), weight 73.1 kg (161 lb 3.2 oz). Body mass index is 25.24 kg/m².    Hearing good.    Dentition fair  Alert, oriented in no acute distress.  Eye contact is good, speech goal directed, affect calm    Assessment and Plan. The following treatment and monitoring plan is recommended:    1. Medicare annual wellness visit, subsequent  Lipid Profile    Comp Metabolic Panel    MICROALBUMIN CREAT RATIO URINE    PROSTATE SPECIFIC AG SCREENING    Subsequent Annual Wellness Visit - Includes PPPS ()   2. Actinic keratosis     3. CAD s/p MVPCI  Lipid Profile    Comp Metabolic Panel    Subsequent Annual Wellness Visit - Includes PPPS ()   4. Nonrheumatic aortic valve insufficiency  Subsequent Annual Wellness Visit - Includes PPPS ()   5. Dyslipidemia  Lipid Profile    Subsequent Annual Wellness Visit - Includes PPPS ()   6. Statin intolerance  Subsequent Annual Wellness Visit - Includes PPPS ()   7. Essential hypertension  Lipid Profile "    Comp Metabolic Panel    MICROALBUMIN CREAT RATIO URINE    Subsequent Annual Wellness Visit - Includes PPPS ()   8. Benign prostatic hyperplasia, unspecified whether lower urinary tract symptoms present  PROSTATE SPECIFIC AG SCREENING    Subsequent Annual Wellness Visit - Includes PPPS ()   9. Prostate cancer screening  PROSTATE SPECIFIC AG SCREENING    Subsequent Annual Wellness Visit - Includes PPPS ()   10. Bilateral primary osteoarthritis of knee  Subsequent Annual Wellness Visit - Includes PPPS ()   11. H/O meniscectomy of right knee  Subsequent Annual Wellness Visit - Includes PPPS ()   12. Onychomycosis  Subsequent Annual Wellness Visit - Includes PPPS ()   13. Tinea pedis of both feet  Subsequent Annual Wellness Visit - Includes PPPS ()   14. Vitamin D deficiency  Subsequent Annual Wellness Visit - Includes PPPS ()   15. H/O cholecystectomy  Subsequent Annual Wellness Visit - Includes PPPS ()   16. Need for vaccination  pneumococcal vaccine (PNEUMOVAX-23) 25 MCG/0.5ML Injection    tetanus-dipth-acell pertussis (ADACEL) 5-2-15.5 LF-MCG/0.5 Suspension    Subsequent Annual Wellness Visit - Includes PPPS ()   17. Encounter for routine eye and vision examination  REFERRAL TO OPTOMETRY   18. Encounter for dental examination  REFERRAL TO DENTISTRY     1) Patient asked to bring in advance directive or POA paperwork    2) Labs are ordered for general health maintenance, and will be expected around the time of our next clinical evaluation.    3) Referral to optometrist & dentist for routine examination      Services suggested: No services needed at this time  Health Care Screening: Age-appropriate preventive services recommended by USPTF and ACIP covered by Medicare were discussed today. Services ordered if indicated and agreed upon by the patient.  Referrals offered: Community-based lifestyle interventions to reduce health risks and promote self-management and  wellness, fall prevention, nutrition, physical activity, tobacco-use cessation, weight loss, and mental health services as per orders if indicated.    Discussion today about general wellness and lifestyle habits:    · Prevent falls and reduce trip hazards; Cautioned about securing or removing rugs.  · Have a working fire alarm and carbon monoxide detector;   · Engage in regular physical activity and social activities     Follow-up: Return in about 1 month (around 4/6/2019) for Actinic Keratosis freezing, follow-up on Advance Directive.

## 2019-03-06 NOTE — PATIENT INSTRUCTIONS
"Please bring in the copy of your paperwork that states who makes medical decisions for you when you are unable to do so.  This may be called an \"Advance Directive\" or may be called \"Power of  for Healthcare purposes.\"                Today, your Healthcare Provider may have discussed the following recommendations:    1. Exercise and Physical Activity  According to the American Heart Association, it is recommended to engage in physical activity regularly and to aim for 150 minutes of moderate-intensity aerobic activity per week.  Your Healthcare Provider may have recommended taking the stairs instead of the elevator, starting or maintaining a walking program or strength-training program.    2. Emotional Well-being  Mental and emotional well-being is essential to overall health.  Your Healthcare Provider may have encouraged you to build strong, positive relationships with family and friends, become more involved in your community (by volunteering or joining a spiritual community), or focus on self-care.    3. Fall and Injury Prevention  To prevent falls and injuries and also improve your balance, your Healthcare Provider may have suggested that you use a cane or walker, start an exercise of physical therapy program, or have your vision and/or hearing tested.    4. Urinary Leakage (Urinary Incontinence)  To control or manage the leakage of urine, your Healthcare Provider may have recommended you start bladder training exercises (such as Kegel exercises), a trial of a medication or a referral to see a specialist to discuss surgical options.  "

## 2019-03-10 PROBLEM — B35.3 TINEA PEDIS OF BOTH FEET: Status: RESOLVED | Noted: 2018-05-03 | Resolved: 2019-03-10

## 2019-03-10 NOTE — ASSESSMENT & PLAN NOTE
New problem, uncontrolled, patient with new hyperkeratotic skin lesion on his left ear.  We discussed that this can be easily treated with cutaneous cryotherapy, and patient would like to do so at the next visit.

## 2019-04-09 ENCOUNTER — APPOINTMENT (OUTPATIENT)
Dept: MEDICAL GROUP | Facility: MEDICAL CENTER | Age: 79
End: 2019-04-09
Payer: MEDICARE

## 2019-04-19 ENCOUNTER — TELEPHONE (OUTPATIENT)
Dept: CARDIOLOGY | Facility: MEDICAL CENTER | Age: 79
End: 2019-04-19

## 2019-04-19 NOTE — TELEPHONE ENCOUNTER
Pt assistance form for [place] completed, scanned into Epic, and and mailed as requested. Left detailed voicemail for pt.

## 2019-10-02 DIAGNOSIS — I25.10 CORONARY ARTERY DISEASE INVOLVING NATIVE CORONARY ARTERY OF NATIVE HEART WITHOUT ANGINA PECTORIS: ICD-10-CM

## 2019-10-02 DIAGNOSIS — I25.10 CORONARY ARTERY DISEASE DUE TO CALCIFIED CORONARY LESION: ICD-10-CM

## 2019-10-02 DIAGNOSIS — I10 ESSENTIAL HYPERTENSION: ICD-10-CM

## 2019-10-02 DIAGNOSIS — I25.84 CORONARY ARTERY DISEASE DUE TO CALCIFIED CORONARY LESION: ICD-10-CM

## 2019-10-03 RX ORDER — CARVEDILOL 12.5 MG/1
TABLET ORAL
Qty: 200 TAB | Refills: 0 | Status: SHIPPED | OUTPATIENT
Start: 2019-10-03 | End: 2020-01-31

## 2019-10-03 RX ORDER — LISINOPRIL 5 MG/1
TABLET ORAL
Qty: 100 TAB | Refills: 0 | Status: SHIPPED | OUTPATIENT
Start: 2019-10-03 | End: 2019-12-31

## 2019-10-29 DIAGNOSIS — N40.0 BENIGN PROSTATIC HYPERPLASIA, UNSPECIFIED WHETHER LOWER URINARY TRACT SYMPTOMS PRESENT: ICD-10-CM

## 2019-10-30 ENCOUNTER — PATIENT OUTREACH (OUTPATIENT)
Dept: HEALTH INFORMATION MANAGEMENT | Facility: OTHER | Age: 79
End: 2019-10-30

## 2019-10-30 RX ORDER — TERAZOSIN 10 MG/1
CAPSULE ORAL
Qty: 90 CAP | Refills: 1 | Status: SHIPPED | OUTPATIENT
Start: 2019-10-30 | End: 2019-11-04 | Stop reason: SDUPTHER

## 2019-10-30 NOTE — PROGRESS NOTES
Outcome: Left Message to schedule schedule [id number]    Please transfer to Patient Outreach Team at [phone] when patient returns call.    [facility] Verified: yes    Attempt # 1    -JTP

## 2019-11-04 DIAGNOSIS — N40.0 BENIGN PROSTATIC HYPERPLASIA, UNSPECIFIED WHETHER LOWER URINARY TRACT SYMPTOMS PRESENT: ICD-10-CM

## 2019-11-04 NOTE — TELEPHONE ENCOUNTER
1. Name:Arcenio Barnett        Call Back Number: [phone] (home)      Patient approves a detailed voicemail message: N\A    Patient called in and said he has been waiting for a Refill on Terazosin. He said Dr. Naik sent him to [facility] however he cant go there and he can only go to [facility] [address]. He said he needed a refill there.

## 2019-11-04 NOTE — TELEPHONE ENCOUNTER
WE need to sent medication to a different pharmacyWas the patient seen in the last year in this department? Yes    Does patient have an active prescription for medications requested? No     Received Request Via: Patient.

## 2019-11-05 RX ORDER — TERAZOSIN 10 MG/1
10 CAPSULE ORAL DAILY
Qty: 90 CAP | Refills: 1 | Status: SHIPPED
Start: 2019-11-05 | End: 2020-01-28

## 2019-12-31 DIAGNOSIS — I25.10 CORONARY ARTERY DISEASE DUE TO CALCIFIED CORONARY LESION: ICD-10-CM

## 2019-12-31 DIAGNOSIS — I25.84 CORONARY ARTERY DISEASE DUE TO CALCIFIED CORONARY LESION: ICD-10-CM

## 2020-01-03 RX ORDER — LISINOPRIL 5 MG/1
TABLET ORAL
Qty: 100 TAB | Refills: 0 | Status: SHIPPED | OUTPATIENT
Start: 2020-01-03 | End: 2020-05-04 | Stop reason: SDUPTHER

## 2020-01-23 ENCOUNTER — PATIENT OUTREACH (OUTPATIENT)
Dept: HEALTH INFORMATION MANAGEMENT | Facility: OTHER | Age: 80
End: 2020-01-23

## 2020-01-23 NOTE — PROGRESS NOTES
Outcome: Left Message    Please transfer to Patient Outreach Team at [phone] when patient returns call.      [facility] Verified: yes    Attempt # 1

## 2020-01-28 ENCOUNTER — OFFICE VISIT (OUTPATIENT)
Dept: MEDICAL GROUP | Facility: MEDICAL CENTER | Age: 80
End: 2020-01-28
Payer: MEDICARE

## 2020-01-28 VITALS
TEMPERATURE: 97.3 F | DIASTOLIC BLOOD PRESSURE: 72 MMHG | HEART RATE: 59 BPM | BODY MASS INDEX: 25.86 KG/M2 | HEIGHT: 66 IN | SYSTOLIC BLOOD PRESSURE: 140 MMHG | WEIGHT: 160.94 LBS | OXYGEN SATURATION: 96 %

## 2020-01-28 DIAGNOSIS — Z12.5 ENCOUNTER FOR PROSTATE CANCER SCREENING: ICD-10-CM

## 2020-01-28 DIAGNOSIS — Z23 NEED FOR 23-POLYVALENT PNEUMOCOCCAL POLYSACCHARIDE VACCINE: ICD-10-CM

## 2020-01-28 DIAGNOSIS — I25.10 CORONARY ARTERY DISEASE INVOLVING NATIVE CORONARY ARTERY OF NATIVE HEART WITHOUT ANGINA PECTORIS: ICD-10-CM

## 2020-01-28 DIAGNOSIS — Z78.9 STATIN INTOLERANCE: ICD-10-CM

## 2020-01-28 DIAGNOSIS — R53.83 FATIGUE, UNSPECIFIED TYPE: ICD-10-CM

## 2020-01-28 DIAGNOSIS — N18.30 CKD (CHRONIC KIDNEY DISEASE) STAGE 3, GFR 30-59 ML/MIN: ICD-10-CM

## 2020-01-28 DIAGNOSIS — N40.0 BENIGN PROSTATIC HYPERPLASIA, UNSPECIFIED WHETHER LOWER URINARY TRACT SYMPTOMS PRESENT: ICD-10-CM

## 2020-01-28 DIAGNOSIS — E78.5 DYSLIPIDEMIA: ICD-10-CM

## 2020-01-28 DIAGNOSIS — M17.0 BILATERAL PRIMARY OSTEOARTHRITIS OF KNEE: ICD-10-CM

## 2020-01-28 DIAGNOSIS — Z23 NEED FOR INFLUENZA VACCINATION: ICD-10-CM

## 2020-01-28 DIAGNOSIS — I10 ESSENTIAL HYPERTENSION: ICD-10-CM

## 2020-01-28 DIAGNOSIS — E55.9 VITAMIN D DEFICIENCY: ICD-10-CM

## 2020-01-28 DIAGNOSIS — I35.1 NONRHEUMATIC AORTIC VALVE INSUFFICIENCY: ICD-10-CM

## 2020-01-28 PROCEDURE — G0009 ADMIN PNEUMOCOCCAL VACCINE: HCPCS | Performed by: INTERNAL MEDICINE

## 2020-01-28 PROCEDURE — G0008 ADMIN INFLUENZA VIRUS VAC: HCPCS | Performed by: INTERNAL MEDICINE

## 2020-01-28 PROCEDURE — 99205 OFFICE O/P NEW HI 60 MIN: CPT | Mod: 25 | Performed by: INTERNAL MEDICINE

## 2020-01-28 PROCEDURE — 90662 IIV NO PRSV INCREASED AG IM: CPT | Performed by: INTERNAL MEDICINE

## 2020-01-28 PROCEDURE — 90732 PPSV23 VACC 2 YRS+ SUBQ/IM: CPT | Performed by: INTERNAL MEDICINE

## 2020-01-28 RX ORDER — TAMSULOSIN HYDROCHLORIDE 0.4 MG/1
0.4 CAPSULE ORAL
Qty: 100 CAP | Refills: 3 | Status: SHIPPED
Start: 2020-01-28 | End: 2020-02-03 | Stop reason: SDUPTHER

## 2020-01-28 RX ORDER — IBUPROFEN 200 MG
200 TABLET ORAL DAILY
COMMUNITY
End: 2020-01-28

## 2020-01-28 RX ORDER — HYDROCODONE BITARTRATE AND ACETAMINOPHEN 5; 325 MG/1; MG/1
1 TABLET ORAL EVERY 8 HOURS PRN
Qty: 90 TAB | Refills: 0 | Status: SHIPPED | OUTPATIENT
Start: 2020-01-28 | End: 2020-02-27

## 2020-01-28 ASSESSMENT — PATIENT HEALTH QUESTIONNAIRE - PHQ9
CLINICAL INTERPRETATION OF PHQ2 SCORE: 6
SUM OF ALL RESPONSES TO PHQ QUESTIONS 1-9: 10
5. POOR APPETITE OR OVEREATING: 0 - NOT AT ALL

## 2020-01-28 NOTE — ASSESSMENT & PLAN NOTE
Chronic problem that requires additional evaluation.  We will update his cholesterol panel to see if it is worsened as it is not clear to me when he stopped taking statin therapy.  He will follow-up with cardiology in late February 2020 and if recommended we can initiate him on PSCK9 inhibitor therapy.

## 2020-01-28 NOTE — ASSESSMENT & PLAN NOTE
Chronic and stable problem.  He denies any cardiac equivalent symptoms at this time.  Continue current regiment of carvedilol, lisinopril, aspirin, and Zetia.  May need also consider PCSK9 inhibitor therapy due to history of coronary disease and intolerance to statins.  We will update his lipid profile, blood counts, metabolic profile as these are all outdated.

## 2020-01-28 NOTE — PROGRESS NOTES
Subjective:     CC:  Diagnoses of Benign prostatic hyperplasia, unspecified whether lower urinary tract symptoms present, Encounter for prostate cancer screening, Need for influenza vaccination, Need for 23-polyvalent pneumococcal polysaccharide vaccine, Fatigue, unspecified type, Vitamin D deficiency, Dyslipidemia, Essential hypertension, CAD s/p MVPCI, Bilateral primary osteoarthritis of knee, Statin intolerance, Nonrheumatic aortic valve insufficiency, and CKD (chronic kidney disease) stage 3, GFR 30-59 ml/min (Formerly [facility]) were pertinent to this visit.    HISTORY OF THE PRESENT ILLNESS: Patient is a 80 y.o. male. This pleasant patient is here today to establish care and discuss the below stated chronic medical conditions. He is unaccompanied for today's visit.    Problem   Fatigue    He reports poor sleep quality and significant fatigue.  Some of this is due to frequency of urination overnight.  He reports that frequency seems partially worse at night than during the daytime.  He reports being told that he snores but is never had a sleep study evaluation.  He has not felt refreshed for at least the past year upon waking.  He not taking his sleeping medicine at this time.     Ckd (Chronic Kidney Disease) Stage 3, Gfr 30-59 Ml/Min ([facility])       Ref. Range 6/21/2018 10:51   Bun Latest Ref Range: 8 - 22 mg/dL 17   Creatinine Latest Ref Range: 0.50 - 1.40 mg/dL 1.34   GFR If Non  Latest Ref Range: >60 mL/min/1.73 m 2 51 (A)     He has evidence of chronic kidney disease on lab work since approximately 2014.  This does not look like it is ever been discussed with the patient previously.  He has a history of coronary artery disease on lisinopril but otherwise no hypertensive medicines that are renally metabolized.  He does admit to regular use of ibuprofen 200 mg daily.  No known history of nephrolithiasis or diabetes.  Does not appear he has had a work-up in the past.  He did have an exophytic lesion on the  kidney noted in an old image in 2012 and it does not appear this was ever followed up on.    CT abdomen (11/2012):  1 cm exophytic lesion off the upper pole left kidney may represent hyperdense cyst though solid renal mass cannot be excluded.  Recommend follow-up renal ultrasound.     Statin Intolerance    Patient reports challenges with statin intolerance, he experienced myalgias and arthralgias on statin therapy.  It improved after he discontinued the medicine.  He does have a significant history of coronary artery disease requiring stent placement and revision after he experienced in-stent restenosis.  He is taking his Zetia.     Nonrheumatic Aortic Valve Insufficiency    Echocardiogram (6/2018):  Prior echo 4/8/16. Normal left ventricular chamber size.  LVEF 60%. Aortic sclerosis without stenosis. Mild-moderate aortic insufficiency. -516 ms. Compared to the report of the prior study done - there has been a slight progression in aortic insufficiency.    He follows annually with cardiology.  He has a known diastolic murmur related to aortic insufficiency.  Appears to be asymptomatic at this time.     Vitamin D Deficiency       Ref. Range 5/3/2016 08:08   25-Hydroxy   Vitamin D 25 Latest Ref Range: 30 - 100 ng/mL 29 (L)     He has a past history of vitamin D deficiency.  He is not taking supplementation at this time.  No known history of fragility fractures or bone disease.  He does have chronic kidney disease by lab work but this is not been discussed previously.     Bilateral Primary Osteoarthritis of Knee    Right Knee Xray (10/2014):  Bone density is normal.  There is no evidence of fracture or dislocation.  Moderate joint space narrowing periventricular sclerosis and marginal spurring is identified in the right knee joint is most prominent in the medial compartment and patellofemoral joint.  There is no joint effusion.    He had moderate osteoarthritis noted of the right knee in 2014.  He reports around  that time he was referred to orthopedic surgery to discuss total knee arthroplasty.  He reports that it was too cost prohibitive and so he is not able to pursue surgical correction.  He reports receiving a pain medication at that time, Norco 5-325 mg, which she took once daily and provide a significant leaf of the knees.  He never came back to have it refilled with his previous primary care provider.  In the meantime he has been taking ibuprofen over-the-counter 200 mg daily.  He does have a history of chronic kidney disease with no recent lab work.     CAD s/p MVPCI    He has a history of CAD status post PCI LAD and RCA in 2007 with subsequent in-stent restenosis of the LAD 6/2016 with a new ABHISHEK placed.  He follows annually with cardiology.  He completed 1 year of Brilinta and aspirin and now is on baby aspirin only.  He is on carvedilol 12.5 mg twice daily and lisinopril 5 mg daily.  He denies any chest pain, palpitations, or dyspnea on exertion at this time.    He will follow-up with cardiology again in late February 2020.  Of note, patient no longer on statin therapy and only on Zetia due to intolerance, and may need to discuss alternative therapy with cardiology.     Benign Prostatic Hyperplasia       Ref. Range 6/27/2014 09:04   Prostatic Specific Antigen Tot Latest Ref Range: 0.00 - 4.00 ng/mL 2.69       He reports longstanding history of lower urinary tract symptoms secondary to enlarged prostate.  He has been maintained on terra Zosyn for both blood pressure control and prostate issues since approximately 2010.  He notes that in the past 12 to 18 months he has developed progressive worsening of lower urinary tract symptoms.  He has nocturia about every 90 minutes overnight as well as urgency with changing position during the day.  He is not yet experienced any incontinence.  No issues with lightheadedness or dizziness.  No hematuria.  No dysuria.     Essential Hypertension    He has a history of  hypertension on treatment with carvedilol 12.5 mg twice daily and lisinopril 5 mg daily.  Borderline high in clinic today.  He denies any chest pain, dyspnea on exertion, or palpitations.  No lightheadedness or dizziness to suggest orthostasis.  He was also taking terazosin for both hypertension and enlarged prostate.    He admits to taking ibuprofen daily which could certainly be causing fluid retention in his blood pressure to increase.  No recent lab work to review.     Dyslipidemia       Ref. Range 6/21/2018 10:51 10/9/2018 10:10   Cholesterol,Tot Latest Ref Range: 100 - 199 mg/dL 174 149   Triglycerides Latest Ref Range: 0 - 149 mg/dL 63 57   HDL Latest Ref Range: >=40 mg/dL 60 57   LDL Latest Ref Range: <100 mg/dL 101 (H) 81     Patient has a history of hyperlipidemia and associated coronary artery disease.  He was previously on statin therapy however this was discontinued due to myalgias and arthralgias.  Cardiology had mentioned in a previous note initiated him on PSCK9 inhibitor therapy, but does not look like this has happened yet.  No recent lipid panel to evaluate but last numbers were at goal.  He is on a baby aspirin daily.            Allergies: Statins [hmg-coa-r inhibitors]    Current Outpatient Medications Ordered in Epic   Medication Sig Dispense Refill   • Multiple Vitamin (ONE-A-DAY 55 PLUS PO) Take  by mouth.     • tamsulosin (FLOMAX) 0.4 MG capsule Take 1 Cap by mouth ONE-HALF HOUR AFTER BREAKFAST. Can increase to 2 tabs after 1 week if still having urinary symptoms. 100 Cap 3   • HYDROcodone-acetaminophen (NORCO) 5-325 MG Tab per tablet Take 1 Tab by mouth every 8 hours as needed for up to 30 days. 90 Tab 0   • lisinopril (PRINIVIL) 5 MG Tab TAKE 1 TABLET BY MOUTH ONCE DAILY 100 Tab 0   • carvedilol (COREG) 12.5 MG Tab TAKE 1 TABLET BY MOUTH TWICE DAILY WITH MEALS 200 Tab 0   • ezetimibe (ZETIA) 10 MG Tab Take 1 Tab by mouth every day. 90 Tab 3   • aspirin (ASA) 81 MG Chew Tab chewable tablet  "Take 81 mg by mouth every day.       No current [facility]-ordered facility-administered medications on file.        Past Medical History:   Diagnosis Date   • Allergy    • CAD (coronary artery disease)     cardiac stents x 2 2000, 2007 [place], [facility] 2007 prox LAD   • Cholelithiasis    • History of bladder infections    • History of coronary artery stent placement 8/1/2014    Prox LAD, also RCA   • Hyperlipidemia    • Hypertension    • Statin intolerance 6/26/2018       History reviewed. No pertinent surgical history.    Social History     Tobacco Use   • Smoking status: Never Smoker   • Smokeless tobacco: Never Used   • Tobacco comment: continued abstinence   Substance Use Topics   • Alcohol use: No     Alcohol/week: 0.0 oz   • Drug use: No       Social History     Patient does not qualify to have social determinant information on file (likely too young).   Social History Narrative    He is  to [place]. He relocated to [place] around 2015 from a small town in [place]. He is dissatisfied with living in [place]       Family History   Problem Relation Age of Onset   • Cancer Father 57        lung cancer   • Cancer Brother 52        lung cancer   • Cancer Sister         breast   • Cancer Sister         breast   • Cancer Sister         pancreatic   • Hyperlipidemia Mother    • Stroke Mother        Health Maintenance: Completed    ROS:   As above in the HPI All Others Reviewed and Negative  Objective:     Exam: /72   Pulse (!) 59   Temp 36.3 °C (97.3 °F) (Temporal)   Ht 1.676 m (5' 6\")   Wt 73 kg (160 lb 15 oz)   SpO2 96%  Body mass index is 25.98 kg/m².    General: Normal appearing. No distress. Appears stated age.  EENT: Eyes conjunctiva clear lids without ptosis, extraocular movements intact, ears normal shape and contour, canals are clear bilaterally, tympanic membranes are benign, oropharynx is without erythema, edema or exudates. Few remaining teeth, no " dentures, no obvious gum disease.  Neck: Supple without JVD. Thyroid is not enlarged.  Pulmonary: Clear to ausculation.  Normal effort. No rales, ronchi, or wheezing. No cough.  Cardiovascular: Regular rate and rhythm with diastolic murmur. No lower extremity edema bilaterally.  Abdomen: Soft, nontender, mildly distended. Normal bowel sounds.   Neurologic: No resting tremor, no increased tone or rigidity.  Lymph: No cervical or supraclavicular lymph nodes are palpable  Skin: Warm and dry.  No obvious lesions.  Musculoskeletal: Antalgic gait. Bilateral knee crepitus on flexion/extension of knees. No extremity cyanosis, clubbing, or edema. Patient ambulates independently and without a gait aid.  Psych: Normal mood and flat affect. Alert and oriented x3. Judgment and insight is normal. Conversant.  Patient denies depression and notes he is simply not satisfied with living in [place] which is why his PHQ 9 was elevated.    Assessment & Plan:   80 y.o. male with the following -    Problem List Items Addressed This Visit     Dyslipidemia     Chronic problem that requires additional evaluation.  We will update his cholesterol panel to see if it is worsened as it is not clear to me when he stopped taking statin therapy.  He will follow-up with cardiology in late February 2020 and if recommended we can initiate him on PSCK9 inhibitor therapy.         Relevant Orders    Lipid Profile    Essential hypertension     Chronic problem that requires additional evaluation.  We will update his metabolic panel and blood counts today.  We are going to discontinue terazosin initiate tamsulosin from the prostate aspect.  We will have him come back in 6 weeks to recheck his blood pressure and if we need to increase his current therapy at that time we can.  He will also plan to follow-up with cardiology in the meantime in late February 2020.         Relevant Orders    CBC WITH DIFFERENTIAL    Comp Metabolic Panel    Benign prostatic  hyperplasia     Chronic and decompensated problem.  We will check his PSA level due to change in lower urinary tract symptoms.  Will refer him to urology.  We will discontinue terazosin and initiate tamsulosin 0.4 mg daily.  I told him that he can initiate this in 1 week's time if he notes only minimal improvement with the tamsulosin.  I have him come back in approximately 6 weeks to follow-up on his urology referral and how he is doing with these medication changes.         Relevant Medications    tamsulosin (FLOMAX) 0.4 MG capsule    Other Relevant Orders    PROSTATE SPECIFIC AG SCREENING    REFERRAL TO UROLOGY    CAD s/p MVPCI     Chronic and stable problem.  He denies any cardiac equivalent symptoms at this time.  Continue current regiment of carvedilol, lisinopril, aspirin, and Zetia.  May need also consider PCSK9 inhibitor therapy due to history of coronary disease and intolerance to statins.  We will update his lipid profile, blood counts, metabolic profile as these are all outdated.         Vitamin D deficiency     Chronic problem that requires additional evaluation.  We will recheck his vitamin D level to ensure stability as this could cause secondary hyperparathyroidism with his chronic kidney disease if not appropriately replaced.         Relevant Orders    VITAMIN D,25 HYDROXY    Bilateral primary osteoarthritis of knee     Chronic and worsening problem.  I have advised him to stop taking ibuprofen due to the risk of worsening kidney impairment and gastritis due to advanced age.  I also recommend in the meantime initiate hydrocodone-acetaminophen 5-325 mg which she can take 1-3 times daily.  I will have him return in 6 weeks to assess how his pain is doing on this regiment.  If it is improved we will get him set up with physical therapy to help with strengthening the surrounding musculature the knees.  He also will decide in the meantime if he would like to meet with orthopedic surgery again to discuss  total knee arthroplasty.  He reports this condition is significantly limiting his ADLs as it is difficult for him to even walk down a couple steps which she must do to get in and out of his home.  Opiate consent form signed today, discussed risk/benefits/alternatives to initiation of this pharmacotherapy.  We will follow-up with him closely.         Relevant Medications    HYDROcodone-acetaminophen (NORCO) 5-325 MG Tab per tablet    Other Relevant Orders    Consent for Opiate Prescription    Statin intolerance     Chronic problem that requires additional evaluation.  We will recheck his lipid panel and discuss initiation of PSCK9 inhibitor therapy if it would not be too cost prohibitive for the patient.         Nonrheumatic aortic valve insufficiency     Chronic and stable problem.  Follow-up with cardiology as recommended in late February 2020.  Will likely need update echo to ensure ongoing stability of aortic insufficiency.  No changes to medication at this time.         Fatigue     New problem.  We will start by screening with an overnight pulse oximetry to look for hypoxia as this may be leading to his fatigue as well as worsening lower urinary tract symptoms related to his prostate.  If this is unremarkable then when I see him in 6 weeks we can perform additional evaluation.         Relevant Orders    Nocturnal Oximetry Study    Patient has been identified as having a positive depression screening. Appropriate orders and counseling have been given.    CKD (chronic kidney disease) stage 3, GFR 30-59 ml/min ([facility])     New problem to my evaluation although looks like he has had this issue for at least the past 6 years.  We will update his lab work and have him follow-up with me in approximately 6 weeks.  We can order repeat renal ultrasound at that time to follow-up on the abnormal lesion noted back in 2012 in the left kidney.           Other Visit Diagnoses     Encounter for prostate cancer screening         Relevant Orders    PROSTATE SPECIFIC AG SCREENING    Need for influenza vaccination        Relevant Orders    INFLUENZA VACCINE, HIGH DOSE (65+ ONLY) (Completed)    Need for 23-polyvalent pneumococcal polysaccharide vaccine        Relevant Orders    PneumoVax PPV23 =>3yo (Completed)           Return in about 6 weeks (around 3/10/2020).    Please note that this dictation was created using voice recognition software. I have made every reasonable attempt to correct obvious errors, but I expect that there are errors of grammar and possibly content that I did not discover before finalizing the note.

## 2020-01-28 NOTE — ASSESSMENT & PLAN NOTE
Chronic problem that requires additional evaluation.  We will recheck his lipid panel and discuss initiation of PSCK9 inhibitor therapy if it would not be too cost prohibitive for the patient.

## 2020-01-28 NOTE — ASSESSMENT & PLAN NOTE
Chronic and worsening problem.  I have advised him to stop taking ibuprofen due to the risk of worsening kidney impairment and gastritis due to advanced age.  I also recommend in the meantime initiate hydrocodone-acetaminophen 5-325 mg which she can take 1-3 times daily.  I will have him return in 6 weeks to assess how his pain is doing on this regiment.  If it is improved we will get him set up with physical therapy to help with strengthening the surrounding musculature the knees.  He also will decide in the meantime if he would like to meet with orthopedic surgery again to discuss total knee arthroplasty.  He reports this condition is significantly limiting his ADLs as it is difficult for him to even walk down a couple steps which she must do to get in and out of his home.  Opiate consent form signed today, discussed risk/benefits/alternatives to initiation of this pharmacotherapy.  We will follow-up with him closely.

## 2020-01-28 NOTE — ASSESSMENT & PLAN NOTE
New problem.  We will start by screening with an overnight pulse oximetry to look for hypoxia as this may be leading to his fatigue as well as worsening lower urinary tract symptoms related to his prostate.  If this is unremarkable then when I see him in 6 weeks we can perform additional evaluation.

## 2020-01-28 NOTE — ASSESSMENT & PLAN NOTE
Chronic and decompensated problem.  We will check his PSA level due to change in lower urinary tract symptoms.  Will refer him to urology.  We will discontinue terazosin and initiate tamsulosin 0.4 mg daily.  I told him that he can initiate this in 1 week's time if he notes only minimal improvement with the tamsulosin.  I have him come back in approximately 6 weeks to follow-up on his urology referral and how he is doing with these medication changes.

## 2020-01-28 NOTE — ASSESSMENT & PLAN NOTE
New problem to my evaluation although looks like he has had this issue for at least the past 6 years.  We will update his lab work and have him follow-up with me in approximately 6 weeks.  We can order repeat renal ultrasound at that time to follow-up on the abnormal lesion noted back in 2012 in the left kidney.

## 2020-01-28 NOTE — ASSESSMENT & PLAN NOTE
Chronic problem that requires additional evaluation.  We will recheck his vitamin D level to ensure stability as this could cause secondary hyperparathyroidism with his chronic kidney disease if not appropriately replaced.

## 2020-01-28 NOTE — ASSESSMENT & PLAN NOTE
Chronic and stable problem.  Follow-up with cardiology as recommended in late February 2020.  Will likely need update echo to ensure ongoing stability of aortic insufficiency.  No changes to medication at this time.

## 2020-01-28 NOTE — ASSESSMENT & PLAN NOTE
Chronic problem that requires additional evaluation.  We will update his metabolic panel and blood counts today.  We are going to discontinue terazosin initiate tamsulosin from the prostate aspect.  We will have him come back in 6 weeks to recheck his blood pressure and if we need to increase his current therapy at that time we can.  He will also plan to follow-up with cardiology in the meantime in late February 2020.

## 2020-01-30 NOTE — PROGRESS NOTES
1. HealthConnect Verified: yes    2. Verify PCP: yes    3. Review and add  to Care Team: yes      5. Reviewed/Updated the following with patient:       •   Communication Preference Obtained? YES  • MyChart Activation: sent activation code       •   E-Mail Address Obtained? YES       •   Appointment Day and Time Preferences? YES       •   Preferred Pharmacy? YES       •   Preferred Lab? YES    6. Care Gap Scheduling (Attempt to Schedule EACH Overdue Care Gap!)    AWV not due at this time

## 2020-01-31 DIAGNOSIS — I25.10 CORONARY ARTERY DISEASE INVOLVING NATIVE CORONARY ARTERY OF NATIVE HEART WITHOUT ANGINA PECTORIS: ICD-10-CM

## 2020-01-31 DIAGNOSIS — I10 ESSENTIAL HYPERTENSION: ICD-10-CM

## 2020-01-31 RX ORDER — CARVEDILOL 12.5 MG/1
TABLET ORAL
Qty: 200 TAB | Refills: 0 | Status: SHIPPED | OUTPATIENT
Start: 2020-01-31 | End: 2020-05-04 | Stop reason: SDUPTHER

## 2020-02-03 ENCOUNTER — TELEPHONE (OUTPATIENT)
Dept: MEDICAL GROUP | Facility: MEDICAL CENTER | Age: 80
End: 2020-02-03

## 2020-02-03 DIAGNOSIS — N40.0 BENIGN PROSTATIC HYPERPLASIA, UNSPECIFIED WHETHER LOWER URINARY TRACT SYMPTOMS PRESENT: ICD-10-CM

## 2020-02-03 RX ORDER — TAMSULOSIN HYDROCHLORIDE 0.4 MG/1
0.4 CAPSULE ORAL
Qty: 100 CAP | Refills: 3 | Status: SHIPPED
Start: 2020-02-03 | End: 2020-03-10

## 2020-02-03 NOTE — TELEPHONE ENCOUNTER
1. Name: Arcenio Barnett      Call Back Number: [phone] (home)      Patient approves a detailed voicemail message: N\A    Pt Arcenio came in and said he called MA last week on Thursday in regards to Medication sent to Pharmacy [facility]     - tamsulosin (FLOMAX) 0.4 MG capsule    He said he is having a lot of problems with them and needs medication to go to [facility] [address] [place] [address]. To also please call him to let him know when it was sent.    Thanks

## 2020-02-10 ENCOUNTER — PATIENT OUTREACH (OUTPATIENT)
Dept: HEALTH INFORMATION MANAGEMENT | Facility: OTHER | Age: 80
End: 2020-02-10

## 2020-02-10 NOTE — PROGRESS NOTES
Patient called and had a question in regards to what his co pay would be for a specific procedure. He stated it would be an outpatient surgery, per the EOB his co pay would be $300 for preferred and $440 for non preferred. I did advise patient I would double check on the type of procedure to make sure of the co pay amounts.

## 2020-02-12 NOTE — PROGRESS NOTES
I called and left massage for patient to call back in regards to the co pay amount. I did double check and the co pay will be $300 for preferred and $440 for non preferred for a same day outpatient procedure.

## 2020-02-19 ENCOUNTER — HOSPITAL ENCOUNTER (OUTPATIENT)
Dept: LAB | Facility: MEDICAL CENTER | Age: 80
End: 2020-02-19
Attending: INTERNAL MEDICINE
Payer: MEDICARE

## 2020-02-19 DIAGNOSIS — N40.0 BENIGN PROSTATIC HYPERPLASIA, UNSPECIFIED WHETHER LOWER URINARY TRACT SYMPTOMS PRESENT: ICD-10-CM

## 2020-02-19 DIAGNOSIS — E55.9 VITAMIN D DEFICIENCY: ICD-10-CM

## 2020-02-19 DIAGNOSIS — Z12.5 ENCOUNTER FOR PROSTATE CANCER SCREENING: ICD-10-CM

## 2020-02-19 DIAGNOSIS — E78.5 DYSLIPIDEMIA: ICD-10-CM

## 2020-02-19 DIAGNOSIS — I10 ESSENTIAL HYPERTENSION: ICD-10-CM

## 2020-02-19 LAB
ALBUMIN SERPL BCP-MCNC: 4.4 G/DL (ref 3.2–4.9)
ALBUMIN/GLOB SERPL: 1.5 G/DL
ALP SERPL-CCNC: 49 U/L (ref 30–99)
ALT SERPL-CCNC: 18 U/L (ref 2–50)
ANION GAP SERPL CALC-SCNC: 9 MMOL/L (ref 0–11.9)
AST SERPL-CCNC: 22 U/L (ref 12–45)
BASOPHILS # BLD AUTO: 1.1 % (ref 0–1.8)
BASOPHILS # BLD: 0.07 K/UL (ref 0–0.12)
BILIRUB SERPL-MCNC: 1 MG/DL (ref 0.1–1.5)
BUN SERPL-MCNC: 23 MG/DL (ref 8–22)
CALCIUM SERPL-MCNC: 9.3 MG/DL (ref 8.5–10.5)
CHLORIDE SERPL-SCNC: 104 MMOL/L (ref 96–112)
CHOLEST SERPL-MCNC: 207 MG/DL (ref 100–199)
CO2 SERPL-SCNC: 27 MMOL/L (ref 20–33)
CREAT SERPL-MCNC: 1.29 MG/DL (ref 0.5–1.4)
EOSINOPHIL # BLD AUTO: 0.46 K/UL (ref 0–0.51)
EOSINOPHIL NFR BLD: 7.5 % (ref 0–6.9)
ERYTHROCYTE [DISTWIDTH] IN BLOOD BY AUTOMATED COUNT: 46.5 FL (ref 35.9–50)
FASTING STATUS PATIENT QL REPORTED: NORMAL
GLOBULIN SER CALC-MCNC: 2.9 G/DL (ref 1.9–3.5)
GLUCOSE SERPL-MCNC: 103 MG/DL (ref 65–99)
HCT VFR BLD AUTO: 47.2 % (ref 42–52)
HDLC SERPL-MCNC: 54 MG/DL
HGB BLD-MCNC: 15.9 G/DL (ref 14–18)
IMM GRANULOCYTES # BLD AUTO: 0.01 K/UL (ref 0–0.11)
IMM GRANULOCYTES NFR BLD AUTO: 0.2 % (ref 0–0.9)
LDLC SERPL CALC-MCNC: 135 MG/DL
LYMPHOCYTES # BLD AUTO: 1.12 K/UL (ref 1–4.8)
LYMPHOCYTES NFR BLD: 18.2 % (ref 22–41)
MCH RBC QN AUTO: 31.9 PG (ref 27–33)
MCHC RBC AUTO-ENTMCNC: 33.7 G/DL (ref 33.7–35.3)
MCV RBC AUTO: 94.6 FL (ref 81.4–97.8)
MONOCYTES # BLD AUTO: 0.63 K/UL (ref 0–0.85)
MONOCYTES NFR BLD AUTO: 10.2 % (ref 0–13.4)
NEUTROPHILS # BLD AUTO: 3.87 K/UL (ref 1.82–7.42)
NEUTROPHILS NFR BLD: 62.8 % (ref 44–72)
NRBC # BLD AUTO: 0 K/UL
NRBC BLD-RTO: 0 /100 WBC
PLATELET # BLD AUTO: 166 K/UL (ref 164–446)
PMV BLD AUTO: 11.6 FL (ref 9–12.9)
POTASSIUM SERPL-SCNC: 4.5 MMOL/L (ref 3.6–5.5)
PROT SERPL-MCNC: 7.3 G/DL (ref 6–8.2)
RBC # BLD AUTO: 4.99 M/UL (ref 4.7–6.1)
SODIUM SERPL-SCNC: 140 MMOL/L (ref 135–145)
TRIGL SERPL-MCNC: 91 MG/DL (ref 0–149)
WBC # BLD AUTO: 6.2 K/UL (ref 4.8–10.8)

## 2020-02-19 PROCEDURE — 36415 COLL VENOUS BLD VENIPUNCTURE: CPT

## 2020-02-19 PROCEDURE — 85025 COMPLETE CBC W/AUTO DIFF WBC: CPT

## 2020-02-19 PROCEDURE — 82306 VITAMIN D 25 HYDROXY: CPT

## 2020-02-19 PROCEDURE — 84153 ASSAY OF PSA TOTAL: CPT

## 2020-02-19 PROCEDURE — 80053 COMPREHEN METABOLIC PANEL: CPT

## 2020-02-19 PROCEDURE — 80061 LIPID PANEL: CPT

## 2020-02-20 LAB
25(OH)D3 SERPL-MCNC: 33 NG/ML (ref 30–100)
PSA SERPL-MCNC: 4.03 NG/ML (ref 0–4)

## 2020-03-02 NOTE — PROGRESS NOTES
Patient called me back and I gave him the information he requested. The co pay would be $300 for preferred and $440 for non preferred. He had no other questions.

## 2020-03-10 ENCOUNTER — OFFICE VISIT (OUTPATIENT)
Dept: MEDICAL GROUP | Facility: MEDICAL CENTER | Age: 80
End: 2020-03-10
Payer: MEDICARE

## 2020-03-10 VITALS
HEIGHT: 66 IN | OXYGEN SATURATION: 95 % | DIASTOLIC BLOOD PRESSURE: 60 MMHG | BODY MASS INDEX: 25.51 KG/M2 | WEIGHT: 158.73 LBS | TEMPERATURE: 97.5 F | HEART RATE: 54 BPM | SYSTOLIC BLOOD PRESSURE: 122 MMHG

## 2020-03-10 DIAGNOSIS — E78.5 DYSLIPIDEMIA: ICD-10-CM

## 2020-03-10 DIAGNOSIS — M17.0 BILATERAL PRIMARY OSTEOARTHRITIS OF KNEE: ICD-10-CM

## 2020-03-10 DIAGNOSIS — N40.0 BENIGN PROSTATIC HYPERPLASIA, UNSPECIFIED WHETHER LOWER URINARY TRACT SYMPTOMS PRESENT: ICD-10-CM

## 2020-03-10 DIAGNOSIS — N18.30 CKD (CHRONIC KIDNEY DISEASE) STAGE 3, GFR 30-59 ML/MIN: ICD-10-CM

## 2020-03-10 DIAGNOSIS — I10 ESSENTIAL HYPERTENSION: ICD-10-CM

## 2020-03-10 DIAGNOSIS — R53.83 FATIGUE, UNSPECIFIED TYPE: ICD-10-CM

## 2020-03-10 PROCEDURE — 99214 OFFICE O/P EST MOD 30 MIN: CPT | Performed by: INTERNAL MEDICINE

## 2020-03-10 RX ORDER — TAMSULOSIN HYDROCHLORIDE 0.4 MG/1
0.8 CAPSULE ORAL
Qty: 100 CAP | Refills: 3 | Status: SHIPPED | OUTPATIENT
Start: 2020-03-10 | End: 2020-11-03 | Stop reason: SDUPTHER

## 2020-03-10 RX ORDER — HYDROCODONE BITARTRATE AND ACETAMINOPHEN 5; 325 MG/1; MG/1
1-2 TABLET ORAL EVERY 4 HOURS PRN
COMMUNITY
End: 2020-09-24

## 2020-03-10 ASSESSMENT — FIBROSIS 4 INDEX: FIB4 SCORE: 2.5

## 2020-03-10 NOTE — ASSESSMENT & PLAN NOTE
Chronic and ongoing problem.  Patient unfortunately canceled his cardiology appointment.  Encouraged him to arrange follow-up to discuss initiation of Repatha due to ongoing elevated cholesterol and history of statin intolerance in the background of CAD.

## 2020-03-10 NOTE — PROGRESS NOTES
Subjective:   Chief Complaint/History of Present Illness:  Arcenio Barnett is a 80 y.o. male established patient who presents today to discuss medical problems as listed below.  He is accompanied by his wife, Linh.    Problem   Fatigue    He reports poor sleep quality and significant fatigue.  Some of this is due to frequency of urination overnight.  He reports that frequency seems partially worse at night than during the daytime.  He reports being told that he snores but is never had a sleep study evaluation.  He has not felt refreshed for at least the past year upon waking.  He not taking his sleeping medicine at this time.     Ckd (Chronic Kidney Disease) Stage 3, Gfr 30-59 Ml/Min ([facility])       Ref. Range 6/21/2018 10:51 2/19/2020 12:54   Bun Latest Ref Range: 8 - 22 mg/dL 17 23 (H)   Creatinine Latest Ref Range: 0.50 - 1.40 mg/dL 1.34 1.29   GFR If Non  Latest Ref Range: >60 mL/min/1.73 m 2 51 (A) 54 (A)     He has evidence of chronic kidney disease on lab work since approximately 2014. He has a history of coronary artery disease on lisinopril but otherwise no hypertensive medicines that are renally metabolized.  He previously used ibuprofen 200 mg daily.  No known history of nephrolithiasis or diabetes.  He had an exophytic lesion on the kidney noted on an old image in 2012 and it does not appear this was ever followed up on.    CT abdomen (11/2012):  1 cm exophytic lesion off the upper pole left kidney may represent hyperdense cyst though solid renal mass cannot be excluded.  Recommend follow-up renal ultrasound.     Bilateral Primary Osteoarthritis of Knee    Right Knee Xray (10/2014):  Bone density is normal.  There is no evidence of fracture or dislocation.  Moderate joint space narrowing periventricular sclerosis and marginal spurring is identified in the right knee joint is most prominent in the medial compartment and patellofemoral joint.  There is no joint effusion.    He had moderate  osteoarthritis noted of the right knee in 2014.  He reports around that time he was referred to orthopedic surgery to discuss total knee arthroplasty.  He reports that it was too cost prohibitive and so he is not able to pursue surgical correction.  He reports receiving a pain medication at that time, Norco 5-325 mg, which she took once daily and provide a significant leaf of the knees.  He never came back to have it refilled with his previous primary care provider.  In the meantime he has been taking ibuprofen over-the-counter 200 mg daily.  He does have a history of chronic kidney disease with no recent lab work. Tried Norco which was only minimally helpful.     Benign Prostatic Hyperplasia       Ref. Range 6/27/2014 09:04   Prostatic Specific Antigen Tot Latest Ref Range: 0.00 - 4.00 ng/mL 2.69        Ref. Range 2/19/2020 12:54   Prostatic Specific Antigen Tot Latest Ref Range: 0.00 - 4.00 ng/mL 4.03 (H)       He reports longstanding history of lower urinary tract symptoms secondary to enlarged prostate.  He has been maintained on terazosin for both blood pressure control and prostate issues since approximately 2010.  He notes that in the past 12 to 18 months he has developed progressive worsening of lower urinary tract symptoms.  He has nocturia about every 90 minutes overnight as well as urgency with changing position during the day.  He is not yet experienced any incontinence.  No issues with lightheadedness or dizziness.  No hematuria.  No dysuria.    Stopped Terazosin and initiated Flomax in January 2020, only minimal benefit noted thus far. Increase Flomax in March 2020.     Essential Hypertension    He has a history of hypertension on treatment with carvedilol 12.5 mg twice daily and lisinopril 5 mg daily. He denies any chest pain, dyspnea on exertion, or palpitations.  No lightheadedness or dizziness to suggest orthostasis.  He was also taking terazosin for both hypertension and enlarged prostate is now  on Flomax.    He admits to be asleep taking ibuprofen daily which could certainly be causing fluid retention in his blood pressure to increase, he has since stopped NSAIDs since January 2020.      Dyslipidemia       Ref. Range 10/9/2018 10:10 2/19/2020 12:54   Cholesterol,Tot Latest Ref Range: 100 - 199 mg/dL 149 207 (H)   Triglycerides Latest Ref Range: 0 - 149 mg/dL 57 91   HDL Latest Ref Range: >=40 mg/dL 57 54   LDL Latest Ref Range: <100 mg/dL 81 135 (H)     Patient has a history of hyperlipidemia and associated coronary artery disease.  He was previously on statin therapy however this was discontinued due to myalgias and arthralgias.  Cardiology had mentioned in a previous note initiated him on PSCK9 inhibitor therapy, but does not look like this has happened yet. He is on a baby aspirin daily.            Additional History:   Allergies:    Statins [hmg-coa-r inhibitors]     Current Medications:     Current Outpatient Medications   Medication Sig Dispense Refill   • HYDROcodone-acetaminophen (NORCO) 5-325 MG Tab per tablet Take 1-2 Tabs by mouth every four hours as needed.     • tamsulosin (FLOMAX) 0.4 MG capsule Take 2 Caps by mouth ONE-HALF HOUR AFTER BREAKFAST. Can increase to 2 tabs after 1 week if still having urinary symptoms. 100 Cap 3   • carvedilol (COREG) 12.5 MG Tab TAKE 1 TABLET BY MOUTH TWICE DAILY WITH MEALS 200 Tab 0   • Multiple Vitamin (ONE-A-DAY 55 PLUS PO) Take  by mouth.     • lisinopril (PRINIVIL) 5 MG Tab TAKE 1 TABLET BY MOUTH ONCE DAILY 100 Tab 0   • ezetimibe (ZETIA) 10 MG Tab Take 1 Tab by mouth every day. 90 Tab 3   • aspirin (ASA) 81 MG Chew Tab chewable tablet Take 81 mg by mouth every day.       No current facility-administered medications for this visit.         Social History:     Social History     Tobacco Use   • Smoking status: Never Smoker   • Smokeless tobacco: Never Used   • Tobacco comment: continued abstinence   Substance Use Topics   • Alcohol use: No     Alcohol/week:  "0.0 oz   • Drug use: No       ROS: As above in the HPI      Objective:   Physical Exam:    Vitals: /60 (BP Location: Left arm, Patient Position: Sitting, BP Cuff Size: Adult)   Pulse (!) 54   Temp 36.4 °C (97.5 °F) (Temporal)   Ht 1.676 m (5' 6\")   Wt 72 kg (158 lb 11.7 oz)   SpO2 95%    BMI: Body mass index is 25.62 kg/m².   General/Constitutional: Vitals as above, Well nourished, well developed male in no acute distress, appears younger than stated age.   Head/Eyes: Head is grossly normal & atraumatic, bilateral conjunctivae clear and not injected, bilateral EOMI, bilateral PERRLA   ENT: Bilateral external ears grossly normal in appearance, Hearing grossly intact, External nares normal in appearance and without discharge/bleeding   Respiratory: No respiratory distress, bilateral lungs are clear to ausculation in all lung fields, no wheezing/rhonchi/rales   Cardiovascular: Regular rate and rhythm with Grade 1 diastolic murmur, distal pulses are intact and equal bilaterally (radial), no bilateral lower extremity edema   MSK: Antalgic gait with pain on flexion and extension of bilateral knees, right worse than left.   Integumentary: No apparent rashes on skin exposed areas   Psych: Judgment grossly appropriate, no apparent depression/anxiety    Health Maintenance:     - Completed    Assessment and Plan:     Problem List Items Addressed This Visit     Dyslipidemia     Chronic and ongoing problem.  Patient unfortunately canceled his cardiology appointment.  Encouraged him to arrange follow-up to discuss initiation of Repatha due to ongoing elevated cholesterol and history of statin intolerance in the background of CAD.         Essential hypertension     Previous problem, improved.  Continue carvedilol and lisinopril.  Encourage patient to make follow-up with cardiology which she canceled a few weeks ago.         Benign prostatic hyperplasia     Chronic and ongoing problem.  Mild increase in PSA since last " check 6 years ago.  Will try to increase Flomax to 0.8 mg daily and recheck with patient in 3 months to see how his lower urinary tract symptoms are doing.  If ongoing then will reorder PSA and refer to urology.         Relevant Medications    tamsulosin (FLOMAX) 0.4 MG capsule    Bilateral primary osteoarthritis of knee     Chronic and ongoing problem.  Patient would like to meet with orthopedic surgery evaluation and treatment recommendations.  Referral placed.  Reports that Norco was minimally helpful.  Advised him to continue avoiding NSAIDs and stick with Tylenol until he meets with orthopedic surgery.         Relevant Orders    REFERRAL TO ORTHOPEDICS    Fatigue     Chronic and ongoing problem. Patient never received overnight pulse oximeter that was ordered. Will have medical assistant call to see if they received our order or why it was not sent. Advised patient to start OTC melatonin to assist with sleep in the meantime.         CKD (chronic kidney disease) stage 3, GFR 30-59 ml/min ([facility])     Chronic and stable problem.  GFR averaging in low to mid 50s.  He will continue to avoid anti-inflammatory medications.  Pressure under better control today, appropriate to continue lisinopril. Continue checking every 6 months.              RTC: 3 months.    PLEASE NOTE: This dictation was created using voice recognition software. I have made every reasonable attempt to correct obvious errors, but I expect that there are errors of grammar and possibly content that I did not discover before finalizing the note.

## 2020-03-10 NOTE — ASSESSMENT & PLAN NOTE
Chronic and ongoing problem.  Patient would like to meet with orthopedic surgery evaluation and treatment recommendations.  Referral placed.  Reports that Norco was minimally helpful.  Advised him to continue avoiding NSAIDs and stick with Tylenol until he meets with orthopedic surgery.

## 2020-03-10 NOTE — ASSESSMENT & PLAN NOTE
Chronic and stable problem.  GFR averaging in low to mid 50s.  He will continue to avoid anti-inflammatory medications.  Pressure under better control today, appropriate to continue lisinopril. Continue checking every 6 months.

## 2020-03-10 NOTE — PATIENT INSTRUCTIONS
Make follow up with cardiology     Consider Miralax 1/2 capful daily or daily fiber to help regulate bowel movements.

## 2020-03-10 NOTE — ASSESSMENT & PLAN NOTE
Chronic and ongoing problem. Patient never received overnight pulse oximeter that was ordered. Will have medical assistant call to see if they received our order or why it was not sent. Advised patient to start OTC melatonin to assist with sleep in the meantime.

## 2020-03-10 NOTE — ASSESSMENT & PLAN NOTE
Previous problem, improved.  Continue carvedilol and lisinopril.  Encourage patient to make follow-up with cardiology which she canceled a few weeks ago.

## 2020-03-10 NOTE — ASSESSMENT & PLAN NOTE
Chronic and ongoing problem.  Mild increase in PSA since last check 6 years ago.  Will try to increase Flomax to 0.8 mg daily and recheck with patient in 3 months to see how his lower urinary tract symptoms are doing.  If ongoing then will reorder PSA and refer to urology.

## 2020-05-04 DIAGNOSIS — I25.10 CORONARY ARTERY DISEASE INVOLVING NATIVE CORONARY ARTERY OF NATIVE HEART WITHOUT ANGINA PECTORIS: ICD-10-CM

## 2020-05-04 DIAGNOSIS — I25.84 CORONARY ARTERY DISEASE DUE TO CALCIFIED CORONARY LESION: ICD-10-CM

## 2020-05-04 DIAGNOSIS — I25.10 CORONARY ARTERY DISEASE DUE TO CALCIFIED CORONARY LESION: ICD-10-CM

## 2020-05-04 DIAGNOSIS — I10 ESSENTIAL HYPERTENSION: ICD-10-CM

## 2020-05-04 RX ORDER — CARVEDILOL 12.5 MG/1
12.5 TABLET ORAL 2 TIMES DAILY WITH MEALS
Qty: 200 TAB | Refills: 0 | Status: SHIPPED | OUTPATIENT
Start: 2020-05-04 | End: 2020-08-24

## 2020-05-04 RX ORDER — LISINOPRIL 5 MG/1
5 TABLET ORAL DAILY
Qty: 100 TAB | Refills: 0 | Status: SHIPPED | OUTPATIENT
Start: 2020-05-04 | End: 2020-08-31

## 2020-05-12 ENCOUNTER — TELEPHONE (OUTPATIENT)
Dept: MEDICAL GROUP | Facility: MEDICAL CENTER | Age: 80
End: 2020-05-12

## 2020-05-12 NOTE — TELEPHONE ENCOUNTER
1. Caller Name: Josseline Barnett                          Call Back Number: [phone] (home)         How would the patient prefer to be contacted with a response: Phone call OK to leave a detailed message    I spoke with josseline about Repatha paperwork [employer]. I let him know it has been sent out.  We are waiting for their response.

## 2020-05-14 ENCOUNTER — TELEPHONE (OUTPATIENT)
Dept: MEDICAL GROUP | Facility: MEDICAL CENTER | Age: 80
End: 2020-05-14

## 2020-05-14 DIAGNOSIS — E78.5 DYSLIPIDEMIA: ICD-10-CM

## 2020-05-14 NOTE — TELEPHONE ENCOUNTER
1. Caller Name: Arcenio Barnett                          Call Back Number: [phone] (home)         How would the patient prefer to be contacted with a response: Phone call OK to leave a detailed message    Called and spoke with Arcenio about the approval for Repatha now until 12/2020  I asked him when he would like to get injection training from Kat our pharmacist. He said he would call later. i told him she is only here Monday's and Thursdays.

## 2020-05-19 ENCOUNTER — TELEPHONE (OUTPATIENT)
Dept: VASCULAR LAB | Facility: MEDICAL CENTER | Age: 80
End: 2020-05-19

## 2020-05-19 NOTE — TELEPHONE ENCOUNTER
[facility]    LM for pt to call clinic and establish care for lipids and possibly PCSK9 education at [facility].     Tan Merritt, PharmD

## 2020-05-19 NOTE — TELEPHONE ENCOUNTER
Pt called back to report that he is not currently interested in PCSK-9 therapy.  Although he received the medication from [employer], he does not wish to begin therapy.  He wishes to discuss with his cardiologist.  Doretha Manjarrez, Clinical Pharmacist, CDE, CACP

## 2020-06-10 ENCOUNTER — APPOINTMENT (OUTPATIENT)
Dept: MEDICAL GROUP | Facility: MEDICAL CENTER | Age: 80
End: 2020-06-10
Payer: MEDICARE

## 2020-07-22 ENCOUNTER — TELEPHONE (OUTPATIENT)
Dept: MEDICAL GROUP | Facility: MEDICAL CENTER | Age: 80
End: 2020-07-22

## 2020-07-22 NOTE — TELEPHONE ENCOUNTER
1. Caller Name: Arcenio Barnett                          Call Back Number: [phone] (home)         How would the patient prefer to be contacted with a response: Phone call OK to leave a detailed message    Arcenio called to say he hasn't slept for 2 nights because he has been stuffed up.  No sore throat no fever  no cold...he thinks it is his sinus'.  What can he take OTC?

## 2020-07-23 NOTE — TELEPHONE ENCOUNTER
Usually mucinex will block additional mucous production and antihistamines will dry up what is already there-- during the day he can use, claritin, allegra, or zyrtec (generics are fine) and at night he can use benadryl or a guaifenesin cough syrup with doxylamine which is sedating. Flonase or nasal rinses can be helpful for a stuffy nose.

## 2020-07-23 NOTE — TELEPHONE ENCOUNTER
1. Caller Name: Arcenio Barnett                          Call Back Number: [phone] (home)         How would the patient prefer to be contacted with a response: Phone call OK to leave a detailed message    I spoke with Mrs. Barnett as Arcenio was out mowing.  He has taken flonase and jenn Shaver will try mucinex and benedryl

## 2020-07-30 ENCOUNTER — OFFICE VISIT (OUTPATIENT)
Dept: CARDIOLOGY | Facility: MEDICAL CENTER | Age: 80
End: 2020-07-30
Payer: MEDICARE

## 2020-07-30 VITALS
HEART RATE: 54 BPM | BODY MASS INDEX: 24.75 KG/M2 | SYSTOLIC BLOOD PRESSURE: 142 MMHG | RESPIRATION RATE: 18 BRPM | WEIGHT: 154 LBS | DIASTOLIC BLOOD PRESSURE: 82 MMHG | HEIGHT: 66 IN | OXYGEN SATURATION: 94 %

## 2020-07-30 DIAGNOSIS — I25.10 CORONARY ARTERY DISEASE INVOLVING NATIVE CORONARY ARTERY OF NATIVE HEART WITHOUT ANGINA PECTORIS: ICD-10-CM

## 2020-07-30 DIAGNOSIS — E78.5 DYSLIPIDEMIA: ICD-10-CM

## 2020-07-30 DIAGNOSIS — I25.5 ISCHEMIC CARDIOMYOPATHY: ICD-10-CM

## 2020-07-30 DIAGNOSIS — I35.1 NONRHEUMATIC AORTIC VALVE INSUFFICIENCY: ICD-10-CM

## 2020-07-30 DIAGNOSIS — I10 ESSENTIAL HYPERTENSION: ICD-10-CM

## 2020-07-30 DIAGNOSIS — Z78.9 STATIN INTOLERANCE: ICD-10-CM

## 2020-07-30 PROCEDURE — 99214 OFFICE O/P EST MOD 30 MIN: CPT | Performed by: INTERNAL MEDICINE

## 2020-07-30 ASSESSMENT — FIBROSIS 4 INDEX: FIB4 SCORE: 2.5

## 2020-07-30 NOTE — PROGRESS NOTES
Chief Complaint   Patient presents with   • HTN (Controlled)     & Nonrheumatic aortic valve insufficiency   • Dyslipidemia       Subjective:   Arcenio Barnett is a 80 y.o. male who presents today for follow-up of coronary artery disease.  History of CAD status post PCI LAD and RCA 2007 with subsequent in-stent restenosis of the LAD 6/2016 with a new ABHISHEK placed. He's been maintained on aspirin and Brilinta now low-dose, beta blocker and Ace. There was confusion with his medical regimen and he is currently taking Toprol-XL and Coreg. Blood pressure is suboptimal. Has not had recent lab tests.     Since last visit he has stopped his pravastatin and takes his Zetia.  Lipid profile is suboptimal for his extensive CAD.  No cardiac symptoms.      Past Medical History:   Diagnosis Date   • Allergy    • CAD (coronary artery disease)     cardiac stents x 2 2000, 2007 [place] 2007 prox LAD   • Cholelithiasis    • History of bladder infections    • History of coronary artery stent placement 8/1/2014    Prox LAD, also RCA   • Hyperlipidemia    • Hypertension    • Statin intolerance 6/26/2018     History reviewed. No pertinent surgical history.  Family History   Problem Relation Age of Onset   • Cancer Father 57        lung cancer   • Cancer Brother 52        lung cancer   • Cancer Sister         breast   • Cancer Sister         breast   • Cancer Sister         pancreatic   • Hyperlipidemia Mother    • Stroke Mother      Social History     Socioeconomic History   • Marital status:      Spouse name: Not on file   • Number of children: Not on file   • Years of education: Not on file   • Highest education level: Not on file   Occupational History   • Not on file   Social Needs   • Financial resource strain: Not on file   • Food insecurity     Worry: Not on file     Inability: Not on file   • Transportation needs     Medical: Not on file     Non-medical: Not on file   Tobacco Use   • Smoking status: Never  Smoker   • Smokeless tobacco: Never Used   • Tobacco comment: continued abstinence   Substance and Sexual Activity   • Alcohol use: No     Alcohol/week: 0.0 oz   • Drug use: No   • Sexual activity: Never     Partners: Female   Lifestyle   • Physical activity     Days per week: Not on file     Minutes per session: Not on file   • Stress: Not on file   Relationships   • Social connections     Talks on phone: Not on file     Gets together: Not on file     Attends Sikhism service: Not on file     Active member of club or organization: Not on file     Attends meetings of clubs or organizations: Not on file     Relationship status: Not on file   • Intimate partner violence     Fear of current or ex partner: Not on file     Emotionally abused: Not on file     Physically abused: Not on file     Forced sexual activity: Not on file   Other Topics Concern   • Not on file   Social History Narrative    He is  to Linh. He relocated to [place] around 2015 from a small town in [place]. He is dissatisfied with living in [place]     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      Outpatient Encounter Medications as of 7/30/2020   Medication Sig Dispense Refill   • carvedilol (COREG) 12.5 MG Tab Take 1 Tab by mouth 2 times a day, with meals. 200 Tab 0   • lisinopril (PRINIVIL) 5 MG Tab Take 1 Tab by mouth every day. 100 Tab 0   • tamsulosin (FLOMAX) 0.4 MG capsule Take 2 Caps by mouth ONE-HALF HOUR AFTER BREAKFAST. Can increase to 2 tabs after 1 week if still having urinary symptoms. 100 Cap 3   • Multiple Vitamin (ONE-A-DAY 55 PLUS PO) Take  by mouth.     • ezetimibe (ZETIA) 10 MG Tab Take 1 Tab by mouth every day. 90 Tab 3   • aspirin (ASA) 81 MG Chew Tab chewable tablet Take 81 mg by mouth every day.     • HYDROcodone-acetaminophen (NORCO) 5-325 MG Tab per tablet Take 1-2 Tabs by mouth every four hours as needed.    "    No facility-administered encounter medications on file as of 7/30/2020.      Review of Systems   All other systems reviewed and are negative.       Objective:   /82 (BP Location: Left arm, Patient Position: Sitting, BP Cuff Size: Adult)   Pulse (!) 54   Resp 18   Ht 1.676 m (5' 6\")   Wt 69.9 kg (154 lb)   SpO2 94%   BMI 24.86 kg/m²     Physical Exam   Constitutional: He is oriented to person, place, and time. He appears well-developed and well-nourished. No distress.   HENT:   Head: Normocephalic and atraumatic.   Right Ear: External ear normal.   Left Ear: External ear normal.   Eyes: Pupils are equal, round, and reactive to light. Conjunctivae and EOM are normal. Right eye exhibits no discharge. Left eye exhibits no discharge. No scleral icterus.   Neck: Normal range of motion. Neck supple. No JVD present. No tracheal deviation present. No thyromegaly present.   Cardiovascular: Normal rate, regular rhythm and intact distal pulses. PMI is not displaced. Exam reveals no gallop and no friction rub.   Murmur ( 1/6 diastolic murmur) heard.  Pulses:       Carotid pulses are 2+ on the right side and 2+ on the left side.       Radial pulses are 2+ on the left side.        Popliteal pulses are 2+ on the right side and 2+ on the left side.        Dorsalis pedis pulses are 2+ on the right side and 2+ on the left side.        Posterior tibial pulses are 2+ on the right side and 2+ on the left side.   Pulmonary/Chest: Effort normal and breath sounds normal. No respiratory distress. He has no wheezes. He has no rales. He exhibits no tenderness.   Abdominal: Soft. Bowel sounds are normal. He exhibits no distension. There is no abdominal tenderness.   Musculoskeletal: Normal range of motion.         General: No tenderness, deformity or edema.   Neurological: He is alert and oriented to person, place, and time. No cranial nerve deficit (cranial nerves II through XII grossly intact). Coordination normal.   Skin: " Skin is warm and dry. No rash noted. He is not diaphoretic. No erythema. No pallor.   Psychiatric: He has a normal mood and affect. His behavior is normal. Thought content normal.   Vitals reviewed.    LABS:  Lab Results   Component Value Date/Time    CHOLSTRLTOT 207 (H) 02/19/2020 12:54 PM     (H) 02/19/2020 12:54 PM    HDL 54 02/19/2020 12:54 PM    TRIGLYCERIDE 91 02/19/2020 12:54 PM       Lab Results   Component Value Date/Time    WBC 6.2 02/19/2020 12:54 PM    RBC 4.99 02/19/2020 12:54 PM    HEMOGLOBIN 15.9 02/19/2020 12:54 PM    HEMATOCRIT 47.2 02/19/2020 12:54 PM    MCV 94.6 02/19/2020 12:54 PM    NEUTSPOLYS 62.80 02/19/2020 12:54 PM    LYMPHOCYTES 18.20 (L) 02/19/2020 12:54 PM    MONOCYTES 10.20 02/19/2020 12:54 PM    EOSINOPHILS 7.50 (H) 02/19/2020 12:54 PM    BASOPHILS 1.10 02/19/2020 12:54 PM     Lab Results   Component Value Date/Time    SODIUM 140 02/19/2020 12:54 PM    POTASSIUM 4.5 02/19/2020 12:54 PM    CHLORIDE 104 02/19/2020 12:54 PM    CO2 27 02/19/2020 12:54 PM    GLUCOSE 103 (H) 02/19/2020 12:54 PM    BUN 23 (H) 02/19/2020 12:54 PM    CREATININE 1.29 02/19/2020 12:54 PM         Lab Results   Component Value Date/Time    ALKPHOSPHAT 49 02/19/2020 12:54 PM    ASTSGOT 22 02/19/2020 12:54 PM    ALTSGPT 18 02/19/2020 12:54 PM    TBILIRUBIN 1.0 02/19/2020 12:54 PM      ECHO CONCLUSIONS (6/21/2018):  Prior echo 4/8/16  Normal left ventricular chamber size.  Left ventricular ejection fraction is visually estimated to be 60%.  Aortic sclerosis without stenosis.  Mild-moderate aortic insufficiency. -516 ms.  Compared to the report of the prior study done - there has been a   slight progression in aortic insufficiency.      Assessment:     1. CAD s/p MVPCI  REFERRAL TO LIPID CLINIC   2. Ischemic cardiomyopathy     3. Nonrheumatic aortic valve insufficiency  EC-ECHOCARDIOGRAM COMPLETE W/O CONT   4. Essential hypertension     5. Statin intolerance  REFERRAL TO LIPID CLINIC   6. Dyslipidemia   REFERRAL TO LIPID CLINIC       Medical Decision Making:  Today's Assessment / Status / Plan:     Doing well.  Does complain of some mild lower extremity edema.  On exam he does have significant lower extremity varicosities.  Recommended compression stockings and an echocardiogram to reassess his aortic valve regurgitation to see if there is a contribution there.  Also recommended for lipid clinic referral again this time, for statin intolerance and CAD.  Follow-up in 1 year.  Continue current medical therapy otherwise.

## 2020-08-12 ENCOUNTER — TELEPHONE (OUTPATIENT)
Dept: VASCULAR LAB | Facility: MEDICAL CENTER | Age: 80
End: 2020-08-12

## 2020-08-12 NOTE — TELEPHONE ENCOUNTER
Received pharmacotherapy referral for Lipids from Dr. Lopez on 07/30/20    Left [facility] for pt to call back.     Insurance: [facility]  PCP: [facility]  Locations to be seen: [facility]?    [facility] at [phone], fax [phone]    Debra He, PharmD

## 2020-08-19 ENCOUNTER — TELEPHONE (OUTPATIENT)
Dept: VASCULAR LAB | Facility: MEDICAL CENTER | Age: 80
End: 2020-08-19

## 2020-08-19 NOTE — TELEPHONE ENCOUNTER
Received pharmacotherapy referral for Lipids from Dr. Lopez on 07/30/20.     Left [facility] for pt to call back.      Insurance: [facility]  PCP: [facility]  Locations to be seen: [place]?     [facility] at [phone], fax [phone]     Debra He, PharmD

## 2020-08-21 DIAGNOSIS — I25.10 CORONARY ARTERY DISEASE INVOLVING NATIVE CORONARY ARTERY OF NATIVE HEART WITHOUT ANGINA PECTORIS: ICD-10-CM

## 2020-08-21 DIAGNOSIS — I10 ESSENTIAL HYPERTENSION: ICD-10-CM

## 2020-08-24 RX ORDER — CARVEDILOL 12.5 MG/1
12.5 TABLET ORAL 2 TIMES DAILY WITH MEALS
Qty: 180 TAB | Refills: 3 | Status: SHIPPED | OUTPATIENT
Start: 2020-08-24 | End: 2021-09-03

## 2020-08-27 ENCOUNTER — TELEPHONE (OUTPATIENT)
Dept: VASCULAR LAB | Facility: MEDICAL CENTER | Age: 80
End: 2020-08-27

## 2020-08-27 NOTE — TELEPHONE ENCOUNTER
Received pharmacotherapy referral for Lipids from Dr. Lopez on 07/30/20.     Left msg for pt to call back, unable to establish care, will send letter and FYI to referring provider.      Insurance: [facility]  [facility]  Locations to be seen: [facility]?     [facility] at [phone], fax [phone]     Debra He, PharmD

## 2020-08-27 NOTE — LETTER
August 27, 2020        Arcenio Barnett  [address]  [place] [address]      Dear Arcenio Barnett ,    We have been unsuccessful in our attempts to contact you regarding your Lipid Clinical Pharmacist referral.  Please contact us if you would like to schedule an appointment for help managing your cholesterol.    Please contact our clinic so we may assist you.  We are open Monday-Friday 8 am until 5 pm.  You may reach our Service at [phone].        Sincerely,       Tan Merritt PharmD, BCPS  Pharmacy Clinical Supervisor  [facility]  Outpatient Ambulatory Care Service

## 2020-08-31 DIAGNOSIS — I25.84 CORONARY ARTERY DISEASE DUE TO CALCIFIED CORONARY LESION: ICD-10-CM

## 2020-08-31 DIAGNOSIS — I25.10 CORONARY ARTERY DISEASE DUE TO CALCIFIED CORONARY LESION: ICD-10-CM

## 2020-08-31 RX ORDER — LISINOPRIL 5 MG/1
TABLET ORAL
Qty: 100 TAB | Refills: 0 | Status: SHIPPED | OUTPATIENT
Start: 2020-08-31 | End: 2020-12-04

## 2020-09-04 ENCOUNTER — TELEPHONE (OUTPATIENT)
Dept: CARDIOLOGY | Facility: MEDICAL CENTER | Age: 80
End: 2020-09-04

## 2020-09-04 NOTE — TELEPHONE ENCOUNTER
Pt stopped by the office and dropped off an empty pill bottle for lisinopril. He states to PAR that he needs a refill. Pt also dropped off an application for [place] Patient Assistance Program enrollment form. Pt completed pt portion, will complete provider portion and have TW review and sign when he is back in office.    Noted pt's refill was sent on 08/31/20 for 100-day supply. Called pt's [facility] pharmacy on [facility] [facility] [address]. Spoke with [employer]. She states refill is available for .    Attempted to call pt to notify, no answer, LM for him to call back.

## 2020-09-10 ENCOUNTER — HOSPITAL ENCOUNTER (OUTPATIENT)
Dept: CARDIOLOGY | Facility: MEDICAL CENTER | Age: 80
End: 2020-09-10
Attending: INTERNAL MEDICINE
Payer: MEDICARE

## 2020-09-10 DIAGNOSIS — I35.1 NONRHEUMATIC AORTIC VALVE INSUFFICIENCY: ICD-10-CM

## 2020-09-10 LAB
LV EJECT FRACT  99904: 60
LV EJECT FRACT MOD 2C 99903: 68.36
LV EJECT FRACT MOD 4C 99902: 56.66
LV EJECT FRACT MOD BP 99901: 64.14

## 2020-09-10 PROCEDURE — 93306 TTE W/DOPPLER COMPLETE: CPT | Mod: 26 | Performed by: INTERNAL MEDICINE

## 2020-09-10 PROCEDURE — 93306 TTE W/DOPPLER COMPLETE: CPT

## 2020-09-18 ENCOUNTER — TELEPHONE (OUTPATIENT)
Dept: CARDIOLOGY | Facility: MEDICAL CENTER | Age: 80
End: 2020-09-18

## 2020-09-21 NOTE — TELEPHONE ENCOUNTER
Form reviewed and signed by TW on 09/18/20 and will be mailed out this morning. Pt made aware. Copy sent to scanning.

## 2020-09-21 NOTE — TELEPHONE ENCOUNTER
Called pt to discuss results which showed no significant changes to previous echo completed in 2018. Pt currently has no complaints or questions. Advised pt to continue with plan until next recommended FV per TW's last OV notes. Verbalized understanding and appreciative of call back.

## 2020-09-24 ENCOUNTER — OFFICE VISIT (OUTPATIENT)
Dept: MEDICAL GROUP | Facility: MEDICAL CENTER | Age: 80
End: 2020-09-24
Payer: MEDICARE

## 2020-09-24 VITALS
WEIGHT: 156.75 LBS | BODY MASS INDEX: 25.19 KG/M2 | OXYGEN SATURATION: 96 % | HEART RATE: 49 BPM | HEIGHT: 66 IN | SYSTOLIC BLOOD PRESSURE: 158 MMHG | DIASTOLIC BLOOD PRESSURE: 70 MMHG | TEMPERATURE: 97.5 F

## 2020-09-24 DIAGNOSIS — M17.0 BILATERAL PRIMARY OSTEOARTHRITIS OF KNEE: ICD-10-CM

## 2020-09-24 DIAGNOSIS — R73.01 ELEVATED FASTING GLUCOSE: ICD-10-CM

## 2020-09-24 DIAGNOSIS — N18.30 CKD (CHRONIC KIDNEY DISEASE) STAGE 3, GFR 30-59 ML/MIN: ICD-10-CM

## 2020-09-24 DIAGNOSIS — J34.89 SINUS DRAINAGE: ICD-10-CM

## 2020-09-24 PROCEDURE — 99214 OFFICE O/P EST MOD 30 MIN: CPT | Performed by: INTERNAL MEDICINE

## 2020-09-24 ASSESSMENT — FIBROSIS 4 INDEX: FIB4 SCORE: 2.5

## 2020-09-24 NOTE — ASSESSMENT & PLAN NOTE
Chronic and ongoing problem.  Shared with him that ongoing use of daily anti-inflammatory medicines can worsen his renal function.  Encouraged him to try to drink adequate amounts of water and keep his blood pressure under better control.

## 2020-09-24 NOTE — ASSESSMENT & PLAN NOTE
New problem that requires additional evaluation.  Will obtain A1c to rule out prediabetes or diabetes.

## 2020-09-24 NOTE — PROGRESS NOTES
Subjective:   Chief Complaint/History of Present Illness:  Arcenio Barnett is a 80 y.o. male established patient who presents today to discuss medical problems as listed below. He is unaccompanied for today's visit.    Problem   Elevated Fasting Glucose       Ref. Range 2/19/2020 12:54   Glucose Latest Ref Range: 65 - 99 mg/dL 103 (H)     He had elevated fasting blood sugar on most recent lab check.  No prior A1c's evaluated.  No known history of prediabetes or diabetes.     Sinus Drainage    Since July he has reported challenges with sinus congestion and drainage.  Affects him mostly at night.  The drainage is often clear and out of both nostrils.  It can also drain down the throat.  The congestion has some associated pressure but no other signs or symptoms of sinus infection.  No prior occurrence.  He thought it might of been allergies.  He has never met with ENT in the past.    When he called with this problem I advised him to try Mucinex, antihistamines, and intranasal Flonase.  The Mucinex did not work.  He did not try antihistamines as he read they could worsen his heart condition.  The intranasal Flonase was helpful but did not provide sustained relief.     Ckd (Chronic Kidney Disease) Stage 3, Gfr 30-59 Ml/Min ([facility])       Ref. Range 6/21/2018 10:51 2/19/2020 12:54   Bun Latest Ref Range: 8 - 22 mg/dL 17 23 (H)   Creatinine Latest Ref Range: 0.50 - 1.40 mg/dL 1.34 1.29   GFR If Non  Latest Ref Range: >60 mL/min/1.73 m 2 51 (A) 54 (A)     He has evidence of chronic kidney disease on lab work since approximately 2014. He has a history of coronary artery disease on lisinopril but otherwise no hypertensive medicines that are renally metabolized.  He previously used ibuprofen 200 mg daily.  No known history of nephrolithiasis or diabetes.  He had an exophytic lesion on the kidney noted on an old image in 2012 and it does not appear this was ever followed up on. He admits to daily use of Advil.      CT abdomen (11/2012):  1 cm exophytic lesion off the upper pole left kidney may represent hyperdense cyst though solid renal mass cannot be excluded.  Recommend follow-up renal ultrasound.     Bilateral Primary Osteoarthritis of Knee    Right Knee Xray (10/2014):  Bone density is normal.  There is no evidence of fracture or dislocation.  Moderate joint space narrowing periventricular sclerosis and marginal spurring is identified in the right knee joint is most prominent in the medial compartment and patellofemoral joint.  There is no joint effusion.    He had moderate osteoarthritis noted of the right knee in 2014.  He reports around that time he was referred to orthopedic surgery to discuss total knee arthroplasty.  He reports that it was too cost prohibitive and so he is not able to pursue surgical correction.  He reports receiving a pain medication at that time, Norco 5-325 mg, which he took once daily and provided significant relief of the knees.  He never came back to have it refilled with his previous primary care provider.  In the meantime he has been taking Advil over-the-counter 200 mg daily.  He does have a history of chronic kidney disease with no recent lab work.     He would like a walker to help with gait stability.  Orthopedics referral placed in the past but he says he does not have enough money for the co-pay.          Additional History:   Allergies:    Statins [hmg-coa-r inhibitors]     Current Medications:     Current Outpatient Medications   Medication Sig Dispense Refill   • lisinopril (PRINIVIL) 5 MG Tab TAKE 1 TABLET BY MOUTH ONCE DAILY **PT  NEEDS  TO  BE  SEEN  FOR  FURTHER  REFILLS** 100 Tab 0   • carvedilol (COREG) 12.5 MG Tab Take 1 Tab by mouth 2 times a day, with meals. 180 Tab 3   • tamsulosin (FLOMAX) 0.4 MG capsule Take 2 Caps by mouth ONE-HALF HOUR AFTER BREAKFAST. Can increase to 2 tabs after 1 week if still having urinary symptoms. 100 Cap 3   • ezetimibe (ZETIA) 10 MG Tab  "Take 1 Tab by mouth every day. 90 Tab 3   • aspirin (ASA) 81 MG Chew Tab chewable tablet Take 81 mg by mouth every day.     • Multiple Vitamin (ONE-A-DAY 55 PLUS PO) Take  by mouth.       No current facility-administered medications for this visit.         Social History:     Social History     Tobacco Use   • Smoking status: Never Smoker   • Smokeless tobacco: Never Used   • Tobacco comment: continued abstinence   Substance Use Topics   • Alcohol use: No     Alcohol/week: 0.0 oz   • Drug use: No       ROS: As above in the HPI      Objective:   Physical Exam:    Vitals: /70   Pulse (!) 49   Temp 36.4 °C (97.5 °F) (Temporal)   Ht 1.676 m (5' 6\")   Wt 71.1 kg (156 lb 12 oz)   SpO2 96%    BMI: Body mass index is 25.3 kg/m².   General/Constitutional: Vitals as above, Well nourished, well developed male in no acute distress   Head/Eyes: Head is grossly normal & atraumatic, bilateral conjunctivae clear and not injected, bilateral EOMI, bilateral PERRLA   ENT: Bilateral external ears grossly normal in appearance, Hearing grossly intact, External nares normal in appearance and without discharge/bleeding   Respiratory: No respiratory distress, bilateral lungs are clear to ausculation in all lung fields (anterior/lateral/posterior), no wheezing/rhonchi/rales   Cardiovascular: Regular rate and rhythm without murmur/gallops/rubs, distal pulses are intact and equal bilaterally (radial), no bilateral lower extremity edema   MSK: Gait grossly normal & not antalgic   Integumentary: No apparent rashes   Psych: Judgment grossly appropriate, no apparent depression/anxiety    Health Maintenance:     - Completed      Assessment and Plan:     Problem List Items Addressed This Visit     Bilateral primary osteoarthritis of knee     Chronic and ongoing problem.  Will reach out to "[employer] [facility] to see if they will include coverage for a front wheeled walker for him.  Advised him to be careful with anti-inflammatories as these " can worsen kidney function.  He would like to hold off on orthopedics at this time, referral placed in the past but he does not think he can afford orthopedics evaluation currently.         Relevant Orders    DME Walker    CKD (chronic kidney disease) stage 3, GFR 30-59 ml/min ([facility])     Chronic and ongoing problem.  Shared with him that ongoing use of daily anti-inflammatory medicines can worsen his renal function.  Encouraged him to try to drink adequate amounts of water and keep his blood pressure under better control.         Relevant Orders    Comp Metabolic Panel    Elevated fasting glucose     New problem that requires additional evaluation.  Will obtain A1c to rule out prediabetes or diabetes.         Relevant Orders    HEMOGLOBIN A1C    Sinus drainage     New problem.  Advised him that he can use antihistamines but he cannot use ones with ephedrine or pseudoephedrine as these had increase his blood pressure.  I wrote down several examples of safe antihistamines.  Can continue use Flonase in the meantime.  If he would not tolerate systemic antihistamines and we could try nasal antihistamine such as azelastine.  If this continues and we could also consider ENT evaluation for more thorough exam.                  RTC: 6 months.    PLEASE NOTE: This dictation was created using voice recognition software. I have made every reasonable attempt to correct obvious errors, but I expect that there are errors of grammar and possibly content that I did not discover before finalizing the note.

## 2020-09-24 NOTE — ASSESSMENT & PLAN NOTE
New problem.  Advised him that he can use antihistamines but he cannot use ones with ephedrine or pseudoephedrine as these had increase his blood pressure.  I wrote down several examples of safe antihistamines.  Can continue use Flonase in the meantime.  If he would not tolerate systemic antihistamines and we could try nasal antihistamine such as azelastine.  If this continues and we could also consider ENT evaluation for more thorough exam.

## 2020-09-24 NOTE — ASSESSMENT & PLAN NOTE
Chronic and ongoing problem.  Will reach out to [facility] to see if they will include coverage for a front wheeled walker for him.  Advised him to be careful with anti-inflammatories as these can worsen kidney function.  He would like to hold off on orthopedics at this time, referral placed in the past but he does not think he can afford orthopedics evaluation currently.

## 2020-10-14 ENCOUNTER — NON-PROVIDER VISIT (OUTPATIENT)
Dept: MEDICAL GROUP | Facility: MEDICAL CENTER | Age: 80
End: 2020-10-14
Payer: MEDICARE

## 2020-10-14 DIAGNOSIS — Z23 NEED FOR VACCINATION: ICD-10-CM

## 2020-10-14 PROCEDURE — G0008 ADMIN INFLUENZA VIRUS VAC: HCPCS | Performed by: INTERNAL MEDICINE

## 2020-10-14 PROCEDURE — 90662 IIV NO PRSV INCREASED AG IM: CPT | Performed by: INTERNAL MEDICINE

## 2020-10-14 NOTE — NON-PROVIDER
"Arcenio Barnett is a 80 y.o. male here for a non-provider visit for:   FLU    Reason for immunization: Annual Flu Vaccine  Immunization records indicate need for vaccine: Yes, confirmed with Epic  Minimum interval has been met for this vaccine: Yes  ABN completed: Not Indicated    Order and dose verified by: eo  VIS Dated  [id number] was given to patient: Yes  All IAC Questionnaire questions were answered \"No.\"    Patient tolerated injection and no adverse effects were observed or reported: Yes    Pt scheduled for next dose in series: Not Indicated  "

## 2020-11-03 DIAGNOSIS — N40.0 BENIGN PROSTATIC HYPERPLASIA, UNSPECIFIED WHETHER LOWER URINARY TRACT SYMPTOMS PRESENT: ICD-10-CM

## 2020-11-03 RX ORDER — TAMSULOSIN HYDROCHLORIDE 0.4 MG/1
0.8 CAPSULE ORAL
Qty: 200 CAP | Refills: 3 | Status: SHIPPED
Start: 2020-11-03 | End: 2021-02-08

## 2020-11-03 NOTE — TELEPHONE ENCOUNTER
Received request via: Patient    Was the patient seen in the last year in this department? Yes    Does the patient have an active prescription (recently filled or refills available) for medication(s) requested? No     Requested Prescriptions     Pending Prescriptions Disp Refills   • tamsulosin (FLOMAX) 0.4 MG capsule 100 Cap 3     Sig: Take 2 Caps by mouth ONE-HALF HOUR AFTER BREAKFAST. Can increase to 2 tabs after 1 week if still having urinary symptoms.

## 2020-12-01 DIAGNOSIS — I25.10 CORONARY ARTERY DISEASE DUE TO CALCIFIED CORONARY LESION: ICD-10-CM

## 2020-12-01 DIAGNOSIS — I25.84 CORONARY ARTERY DISEASE DUE TO CALCIFIED CORONARY LESION: ICD-10-CM

## 2020-12-10 RX ORDER — LISINOPRIL 5 MG/1
5 TABLET ORAL DAILY
Qty: 100 TAB | Refills: 2 | Status: SHIPPED
Start: 2020-12-10 | End: 2021-11-30

## 2020-12-10 NOTE — TELEPHONE ENCOUNTER
Gwen Sofia, Med Ass't  P Rihv Ma/Par             TW     NM: Arcenio Barnett     PH: [phone]    PT NM: Arcenio Barnett     : 40    RE: Would like his RX refilled       --------------------------------------   Message History  Account: [id number]   Taken:  Mon 07-Dec-2020 12:27p    Serial#: [id number]

## 2021-01-11 DIAGNOSIS — Z23 NEED FOR VACCINATION: ICD-10-CM

## 2021-01-22 ENCOUNTER — IMMUNIZATION (OUTPATIENT)
Dept: FAMILY PLANNING/WOMEN'S HEALTH CLINIC | Facility: IMMUNIZATION CENTER | Age: 81
End: 2021-01-22
Attending: INTERNAL MEDICINE
Payer: MEDICARE

## 2021-01-22 DIAGNOSIS — Z23 NEED FOR VACCINATION: ICD-10-CM

## 2021-01-22 DIAGNOSIS — Z23 ENCOUNTER FOR VACCINATION: Primary | ICD-10-CM

## 2021-01-22 PROCEDURE — 0001A PFIZER SARS-COV-2 VACCINE: CPT

## 2021-01-22 PROCEDURE — 91300 PFIZER SARS-COV-2 VACCINE: CPT

## 2021-02-08 DIAGNOSIS — N40.0 BENIGN PROSTATIC HYPERPLASIA, UNSPECIFIED WHETHER LOWER URINARY TRACT SYMPTOMS PRESENT: ICD-10-CM

## 2021-02-08 RX ORDER — TERAZOSIN 10 MG/1
10 CAPSULE ORAL DAILY
Qty: 100 CAP | Refills: 3 | Status: SHIPPED
Start: 2021-02-08 | End: 2021-11-30

## 2021-02-12 ENCOUNTER — IMMUNIZATION (OUTPATIENT)
Dept: FAMILY PLANNING/WOMEN'S HEALTH CLINIC | Facility: IMMUNIZATION CENTER | Age: 81
End: 2021-02-12
Attending: INTERNAL MEDICINE
Payer: MEDICARE

## 2021-02-12 DIAGNOSIS — Z23 ENCOUNTER FOR VACCINATION: Primary | ICD-10-CM

## 2021-02-12 PROCEDURE — 0002A PFIZER SARS-COV-2 VACCINE: CPT

## 2021-02-12 PROCEDURE — 91300 PFIZER SARS-COV-2 VACCINE: CPT

## 2021-02-22 ENCOUNTER — HOSPITAL ENCOUNTER (OUTPATIENT)
Dept: LAB | Facility: MEDICAL CENTER | Age: 81
End: 2021-02-22
Attending: INTERNAL MEDICINE
Payer: MEDICARE

## 2021-02-22 ENCOUNTER — TELEPHONE (OUTPATIENT)
Dept: MEDICAL GROUP | Facility: PHYSICIAN GROUP | Age: 81
End: 2021-02-22

## 2021-02-22 DIAGNOSIS — R73.01 ELEVATED FASTING GLUCOSE: ICD-10-CM

## 2021-02-22 DIAGNOSIS — N18.30 CKD (CHRONIC KIDNEY DISEASE) STAGE 3, GFR 30-59 ML/MIN: ICD-10-CM

## 2021-02-22 LAB
ALBUMIN SERPL BCP-MCNC: 4 G/DL (ref 3.2–4.9)
ALBUMIN/GLOB SERPL: 1.5 G/DL
ALP SERPL-CCNC: 57 U/L (ref 30–99)
ALT SERPL-CCNC: 17 U/L (ref 2–50)
ANION GAP SERPL CALC-SCNC: 6 MMOL/L (ref 7–16)
AST SERPL-CCNC: 20 U/L (ref 12–45)
BILIRUB SERPL-MCNC: 0.5 MG/DL (ref 0.1–1.5)
BUN SERPL-MCNC: 22 MG/DL (ref 8–22)
CALCIUM SERPL-MCNC: 9.2 MG/DL (ref 8.5–10.5)
CHLORIDE SERPL-SCNC: 105 MMOL/L (ref 96–112)
CO2 SERPL-SCNC: 28 MMOL/L (ref 20–33)
CREAT SERPL-MCNC: 1.32 MG/DL (ref 0.5–1.4)
EST. AVERAGE GLUCOSE BLD GHB EST-MCNC: 126 MG/DL
GLOBULIN SER CALC-MCNC: 2.7 G/DL (ref 1.9–3.5)
GLUCOSE SERPL-MCNC: 106 MG/DL (ref 65–99)
HBA1C MFR BLD: 6 % (ref 0–5.6)
POTASSIUM SERPL-SCNC: 4.3 MMOL/L (ref 3.6–5.5)
PROT SERPL-MCNC: 6.7 G/DL (ref 6–8.2)
SODIUM SERPL-SCNC: 139 MMOL/L (ref 135–145)

## 2021-02-22 PROCEDURE — 80053 COMPREHEN METABOLIC PANEL: CPT

## 2021-02-22 PROCEDURE — 83036 HEMOGLOBIN GLYCOSYLATED A1C: CPT

## 2021-02-22 PROCEDURE — 36415 COLL VENOUS BLD VENIPUNCTURE: CPT

## 2021-02-22 NOTE — TELEPHONE ENCOUNTER
ANNUAL WELLNESS VISIT PRE-VISIT PLANNING    1.  Reviewed notes from the last office visit: Yes    2.  If any orders were ordered or intended to be done prior to visit (i.e. 6 mos follow-up), do we have results/consult notes or has patient scheduled?        •  Labs - Labs ordered, completed on 02/22/21 and results are in chart.  Note: If patient appointment is for lab review and patient did not complete labs, check with provider if OK to reschedule patient until labs completed.       •  Imaging - Imaging was not ordered at last office visit.       •  Referrals - No referrals were ordered at last office visit.    3.  Immunizations were updated in Epic using Reconcile Outside Information activity? Yes       •  Is patient due for Tdap? YES. Patient was notified of copay/out of pocket cost.       •  Is patient due for Shingrix? YES. Patient was notified of copay/out of pocket cost.    4.  Patient is due for the following Health Maintenance Topics:   Health Maintenance Due   Topic Date Due   • IMM DTaP/Tdap/Td Vaccine (1 - Tdap) 01/01/1959   • IMM ZOSTER VACCINES (1 of 2) 01/01/1990   • Annual Wellness Visit  03/06/2020       - Patient plans to schedule appointment for Annual Wellness Visit (AWV) and Immunizations: TDAP and SHINGRIX (Shingles).    5.  Reviewed/Updated the following with patient:       •   Preferred Pharmacy? Yes       •   Preferred Lab? Yes       •   Preferred Communication? Yes       •   Allergies? Yes       •   Medications? YES. Was Abstract Encounter opened and chart updated? NO       •   Social History? Yes       •   Family History (document living status of immediate family members and if + hx of  cancer, diabetes, hypertension, hyperlipidemia, heart attack, stroke) Yes    6.  Care Team Updated:       •   DME Company (gait device, O2, CPAP, etc.): NO       •   Other Specialists (eye doctor, derm, GYN, cardiology, endo, etc): NO    7.  Patient was advised: “This is a free wellness visit. The provider  will screen for medical conditions to help you stay healthy. If you have other concerns to address you may be asked to discuss these at a separate visit or there may be an additional fee.”     8.  AHA (Puls[employer]) form printed for Provider? N/A

## 2021-03-01 ENCOUNTER — OFFICE VISIT (OUTPATIENT)
Dept: MEDICAL GROUP | Facility: PHYSICIAN GROUP | Age: 81
End: 2021-03-01
Payer: MEDICARE

## 2021-03-01 VITALS
SYSTOLIC BLOOD PRESSURE: 128 MMHG | WEIGHT: 161.5 LBS | HEIGHT: 66 IN | DIASTOLIC BLOOD PRESSURE: 72 MMHG | TEMPERATURE: 97.5 F | HEART RATE: 60 BPM | OXYGEN SATURATION: 96 % | BODY MASS INDEX: 25.96 KG/M2

## 2021-03-01 DIAGNOSIS — N18.31 STAGE 3A CHRONIC KIDNEY DISEASE: ICD-10-CM

## 2021-03-01 DIAGNOSIS — E78.5 DYSLIPIDEMIA: ICD-10-CM

## 2021-03-01 DIAGNOSIS — Z23 NEED FOR VACCINATION: ICD-10-CM

## 2021-03-01 DIAGNOSIS — I10 ESSENTIAL HYPERTENSION: ICD-10-CM

## 2021-03-01 DIAGNOSIS — R73.03 PREDIABETES: ICD-10-CM

## 2021-03-01 DIAGNOSIS — E55.9 VITAMIN D DEFICIENCY: ICD-10-CM

## 2021-03-01 DIAGNOSIS — M17.0 BILATERAL PRIMARY OSTEOARTHRITIS OF KNEE: ICD-10-CM

## 2021-03-01 DIAGNOSIS — Z00.00 ENCOUNTER FOR SUBSEQUENT ANNUAL WELLNESS VISIT (AWV) IN MEDICARE PATIENT: Primary | ICD-10-CM

## 2021-03-01 DIAGNOSIS — Z12.5 ENCOUNTER FOR SCREENING FOR MALIGNANT NEOPLASM OF PROSTATE: ICD-10-CM

## 2021-03-01 DIAGNOSIS — Z98.890 H/O MENISCECTOMY OF RIGHT KNEE: ICD-10-CM

## 2021-03-01 DIAGNOSIS — I35.1 NONRHEUMATIC AORTIC VALVE INSUFFICIENCY: ICD-10-CM

## 2021-03-01 PROBLEM — R73.01 ELEVATED FASTING GLUCOSE: Status: RESOLVED | Noted: 2020-09-24 | Resolved: 2021-03-01

## 2021-03-01 PROCEDURE — 90471 IMMUNIZATION ADMIN: CPT | Performed by: INTERNAL MEDICINE

## 2021-03-01 PROCEDURE — G0439 PPPS, SUBSEQ VISIT: HCPCS | Performed by: INTERNAL MEDICINE

## 2021-03-01 PROCEDURE — 90750 HZV VACC RECOMBINANT IM: CPT | Performed by: INTERNAL MEDICINE

## 2021-03-01 RX ORDER — IBUPROFEN 200 MG
200 TABLET ORAL EVERY 8 HOURS PRN
Status: ON HOLD | COMMUNITY
End: 2022-01-12

## 2021-03-01 ASSESSMENT — ACTIVITIES OF DAILY LIVING (ADL): BATHING_REQUIRES_ASSISTANCE: 0

## 2021-03-01 ASSESSMENT — ENCOUNTER SYMPTOMS: GENERAL WELL-BEING: GOOD

## 2021-03-01 ASSESSMENT — PATIENT HEALTH QUESTIONNAIRE - PHQ9: CLINICAL INTERPRETATION OF PHQ2 SCORE: 0

## 2021-03-01 ASSESSMENT — FIBROSIS 4 INDEX: FIB4 SCORE: 2.37

## 2021-03-01 NOTE — ASSESSMENT & PLAN NOTE
Chronic and ongoing problem.  He is agreeable to meet with orthopedic surgery at this time.  Will place referral to [facility] to consider steroid injection versus Synvisc.  He would like to avoid total knee replacement at this time if able.  Advised to use Tylenol for pain and to avoid anti-inflammatories due to chronic kidney disease and hypertension.

## 2021-03-01 NOTE — ASSESSMENT & PLAN NOTE
New problem by my evaluation.  Patient has prediabetes.  He eats a fairly well-balanced diet.  No prior exposure to glucose lowering medications in the past.  We will recheck hemoglobin A1c in 6 months as well as urine microalbumin to ensure ongoing stability.  He has chronic kidney disease but this is likely related to NSAID exposure and hypertension and not prediabetes.

## 2021-03-01 NOTE — ASSESSMENT & PLAN NOTE
Chronic and stable problem.  Updated echocardiogram from September 2020 shows stable aortic insufficiency.  Continue follow-up with cardiology annually as recommended.

## 2021-03-01 NOTE — ASSESSMENT & PLAN NOTE
Chronic and stable problem.  GFR continues to range in the low 50s.  He knows that he should avoid anti-inflammatories as this will worsen kidney function and stay well-hydrated.  We will continue to follow his kidney function every 6 months.  Appropriate to continue on current regimen, avoid nephrotoxic agents as able.

## 2021-03-01 NOTE — ASSESSMENT & PLAN NOTE
Chronic and ongoing problem.  He was mailed samples of Repatha to try but was told his insurance would not pay for it following the new year and so he decided not to take it.  He is not taking anything dedicated for his cholesterol at this time.

## 2021-03-01 NOTE — ASSESSMENT & PLAN NOTE
Chronic and improved problem.  His blood pressure looks as good as it has in the past 6 months today.  He continues to have stable chronic kidney disease and electrolytes are normal.  Continue current regimen of Terazosin, lisinopril, and carvedilol.  He has been advised to avoid NSAIDs as much as possible as this will increase his blood pressure and cause fluid retention.  Continue follow-up with cardiology as recommended.

## 2021-03-01 NOTE — LETTER
[facility]  Mirna Charlton D.O.  [address]  [place] [address]  Fax: [phone]   Authorization for Release/Disclosure of   Protected Health Information   Name: SARAH BETH ROCA : 1940 SSN: [id number]   Address: [address] [place] [address] [address]  [place] [address] Phone:    [phone] (home)    I authorize the entity listed below to release/disclose the PHI below to:   [facility]/Mirna Charlton D.O. and Mirna Charlton D.O.   Provider or Entity Name:  [facility]   Address   City, State, Zip   Phone:      Fax:     Reason for request: continuity of care   Information to be released:    [  ] LAST COLONOSCOPY,  including any PATH REPORT and follow-up  [  ] LAST FIT/COLOGUARD RESULT [  ] LAST DEXA  [  ] LAST MAMMOGRAM  [  ] LAST PAP  [  ] LAST LABS [  ] RETINA EXAM REPORT  [  ] IMMUNIZATION RECORDS  [ x ] Release all info      [ x ] Check here and initial the line next to each item to release ALL health information INCLUDING  _____ Care and treatment for drug and / or alcohol abuse  _____ HIV testing, infection status, or AIDS  _____ Genetic Testing    DATES OF SERVICE OR TIME PERIOD TO BE DISCLOSED: ___-__________  I understand and acknowledge that:  * This Authorization may be revoked at any time by you in writing, except if your health information has already been used or disclosed.  * Your health information that will be used or disclosed as a result of you signing this authorization could be re-disclosed by the recipient. If this occurs, your re-disclosed health information may no longer be protected by State or Federal laws.  * You may refuse to sign this Authorization. Your refusal will not affect your ability to obtain treatment.  * This Authorization becomes effective upon signing and will  on (date) __________.      If no date is indicated, this Authorization will  one (1) year from the signature date.    Name: Sarah Beth Roca    Signature:   Date:     3/1/2021            PLEASE FAX REQUESTED RECORDS BACK TO: [phone]

## 2021-03-01 NOTE — PROGRESS NOTES
Chief Complaint   Patient presents with   • Annual Exam         HPI:  Arcenio Barnett is a 81 y.o. male here for Medicare Annual Wellness Visit     Problem   Prediabetes       Ref. Range 2/22/2021 10:18   Glucose Latest Ref Range: 65 - 99 mg/dL 106 (H)   Glycohemoglobin Latest Ref Range: 0.0 - 5.6 % 6.0 (H)   Estim. Avg Glu Latest Units: mg/dL 126     He had mild glucose intolerance on previous evaluations.  No use of pharmacotherapy to lower blood sugar in the past.  His A1c is 6.0, no previous to compare with.  One of his children has type 2 diabetes requiring insulin.     Ckd (Chronic Kidney Disease) Stage 3, Gfr 30-59 Ml/Min       Ref. Range 6/21/2018 10:51 2/19/2020 12:54   Bun Latest Ref Range: 8 - 22 mg/dL 17 23 (H)   Creatinine Latest Ref Range: 0.50 - 1.40 mg/dL 1.34 1.29   GFR If Non  Latest Ref Range: >60 mL/min/1.73 m 2 51 (A) 54 (A)     He has evidence of chronic kidney disease on lab work since approximately 2014. He has a history of coronary artery disease on lisinopril but otherwise no hypertensive medicines that are renally metabolized.  He previously used ibuprofen 200 mg daily.  No known history of nephrolithiasis or diabetes.  He had an exophytic lesion on the kidney noted on an old image in 2012 and it does not appear this was ever followed up on. He admits to daily use of Advil.     CT abdomen (11/2012):  1 cm exophytic lesion off the upper pole left kidney may represent hyperdense cyst though solid renal mass cannot be excluded.  Recommend follow-up renal ultrasound.     Nonrheumatic Aortic Valve Insufficiency    Echocardiogram (9/2020):  Left ventricular ejection fraction is visually estimated to be 60%. Mild  aortic insufficiency. Unable to estimate RVSP, normal estimated RAP.     Compared to the images of the prior study done on 06/21/2018, there has been no significant change.       He follows annually with cardiology.  He has a known diastolic murmur related to aortic  insufficiency.  Continues to be asymptomatic at this time.     Vitamin D Deficiency       Ref. Range 5/3/2016 2/19/2020   25-Hydroxy   Vitamin D 25 Latest Ref Range: 30 - 100 ng/mL 29 (L) 33     He has a past history of vitamin D deficiency.  He is not taking supplementation at this time.  No known history of fragility fractures or bone disease.  He has chronic kidney disease, no known parathyroid disease.     Bilateral Primary Osteoarthritis of Knee    Right Knee Xray (10/2014):  Bone density is normal.  There is no evidence of fracture or dislocation.  Moderate joint space narrowing periventricular sclerosis and marginal spurring is identified in the right knee joint is most prominent in the medial compartment and patellofemoral joint.  There is no joint effusion.    He had moderate osteoarthritis noted of the right knee in 2014.  He reports around that time he was referred to orthopedic surgery to discuss total knee arthroplasty.  He reports that it was too cost prohibitive and so he is not able to pursue surgical correction.  He reports receiving a pain medication at that time, Norco 5-325 mg, which he took once daily and provided significant relief of the knees.  He never came back to have it refilled with his previous primary care provider.  In the meantime he has been taking Advil over-the-counter 200 mg daily.  He does have a history of chronic kidney disease with no recent lab work.     He would like a walker to help with gait stability.  Orthopedics referral placed in the past but he says he does not have enough money for the co-pay.     Essential Hypertension       Ref. Range 2/22/2021 10:18   Sodium Latest Ref Range: 135 - 145 mmol/L 139   Potassium Latest Ref Range: 3.6 - 5.5 mmol/L 4.3   Chloride Latest Ref Range: 96 - 112 mmol/L 105   Bun Latest Ref Range: 8 - 22 mg/dL 22   Creatinine Latest Ref Range: 0.50 - 1.40 mg/dL 1.32   GFR If Non  Latest Ref Range: >60 mL/min/1.73 m 2 52 (A)        He has a history of hypertension on treatment. He denies any chest pain, dyspnea on exertion, or palpitations.  No lightheadedness or dizziness to suggest orthostasis.  He is also taking terazosin for both hypertension and enlarged prostate.    He admits to taking ibuprofen daily which could certainly be causing fluid retention in his blood pressure to increase, he has since stopped NSAIDs since January 2020.     Current regimen: carvedilol 12.5 mg BID, terazosin 10 mg daily, and lisinopril 5 mg daily.     Blood pressure in clinic ranges 128-158/70-82     Dyslipidemia       Ref. Range 10/9/2018 10:10 2/19/2020 12:54   Cholesterol,Tot Latest Ref Range: 100 - 199 mg/dL 149 207 (H)   Triglycerides Latest Ref Range: 0 - 149 mg/dL 57 91   HDL Latest Ref Range: >=40 mg/dL 57 54   LDL Latest Ref Range: <100 mg/dL 81 135 (H)     Patient has a history of hyperlipidemia and associated coronary artery disease.  He was previously on statin therapy however this was discontinued due to myalgias and arthralgias.  Cardiology had mentioned in a previous note initiated him on PSCK9 inhibitor therapy, but does not look like this has happened yet. He is on a baby aspirin daily.       Elevated Fasting Glucose (Resolved)       Ref. Range 2/19/2020 12:54   Glucose Latest Ref Range: 65 - 99 mg/dL 103 (H)     He had elevated fasting blood sugar on most recent lab check.  No prior A1c's evaluated.  No known history of prediabetes or diabetes.              Current supplements as per medication list.       Allergies: Statins [hmg-coa-r inhibitors]    Current social contact/activities: Spends times with wife     He  reports that he has never smoked. He has never used smokeless tobacco. He reports that he does not drink alcohol and does not use drugs.  Counseling given: Not Answered  Comment: continued abstinence      DPA/Advanced Directive:  Patient does not have an Advanced Directive.  A packet and workshop information was given on  Advanced Directives.    ROS:    Gait: Uses no assistive device  Ostomy: No  Other tubes: No  Amputations: No  Chronic oxygen use: No  Last eye exam: Unknown, patient will check and mychart us back -> 2015  Wears hearing aids: No   : Denies any urinary leakage during the last 6 months    Screening:    Depression Screening    Little interest or pleasure in doing things?  0 - not at all  Feeling down, depressed , or hopeless? 0 - not at all  Trouble falling or staying asleep, or sleeping too much?     Feeling tired or having little energy?     Poor appetite or overeating?     Feeling bad about yourself - or that you are a failure or have let yourself or your family down?    Trouble concentrating on things, such as reading the newspaper or watching television?    Moving or speaking so slowly that other people could have noticed.  Or the opposite - being so fidgety or restless that you have been moving around a lot more than usual?     Thoughts that you would be better off dead, or of hurting yourself?     Patient Health Questionnaire Score: 10    If depressive symptoms identified deferred to follow up visit unless specifically addressed in assessment and plan.    Interpretation of PHQ-9 Total Score   Score Severity   1-4 No Depression   5-9 Mild Depression   10-14 Moderate Depression   15-19 Moderately Severe Depression   20-27 Severe Depression      Screening for Cognitive Impairment    Three Minute Recall (river, nation, finger) 2/3    [place] clock face with all 12 numbers and set the hands to show 10 past 11.  Yes    Cognitive concerns identified deferred for follow up unless specifically addressed in assessment and plan.    Fall Risk Assessment    Has the patient had two or more falls in the last year or any fall with injury in the last year?  No    Safety Assessment    Throw rugs on floor.  No  Handrails on all stairs.  Yes  Good lighting in all hallways.  Yes  Difficulty hearing.  Yes  Patient counseled about all  safety risks that were identified.    Functional Assessment ADLs    Are there any barriers preventing you from cooking for yourself or meeting nutritional needs?  No.    Are there any barriers preventing you from driving safely or obtaining transportation?  No.    Are there any barriers preventing you from using a telephone or calling for help?  No.    Are there any barriers preventing you from shopping?  No.    Are there any barriers preventing you from taking care of your own finances?  No.    Are there any barriers preventing you from managing your medications?  No.    Are there any barriers preventing you from showering, bathing or dressing yourself? No.    Are you currently engaging in any exercise or physical activity?  No.     What is your perception of your health?  Good.    Health Maintenance Summary                Annual Wellness Visit Overdue 3/6/2020      Done 3/6/2019 SUBSEQUENT ANNUAL WELLNESS VISIT-INCLUDES PPPS ()     Patient has more history with this topic...    IMM DTaP/Tdap/Td Vaccine Postponed 3/1/2022 Originally 1/1/1959. Insurance/Financial    IMM ZOSTER VACCINES Next Due 4/26/2021      Done 3/1/2021 Imm Admin: Zoster Vaccine Recombinant (RZV) (SHINGRIX)    COLONOSCOPY Next Due 10/16/2025      Patient Declined 10/16/2015 pt declined          Patient Care Team:  Mirna Charlton D.O. as PCP - General (Internal Medicine)  Kareem Lopez M.D. as Consulting Physician (Interventional Cardiology)  Chidi Anand Ass't as        Social History     Tobacco Use   • Smoking status: Never Smoker   • Smokeless tobacco: Never Used   • Tobacco comment: continued abstinence   Substance Use Topics   • Alcohol use: No     Alcohol/week: 0.0 oz   • Drug use: No     Family History   Problem Relation Age of Onset   • Cancer Father 57        lung cancer   • Cancer Brother 52        lung cancer   • Cancer Sister         breast   • Cancer Sister         breast   • Cancer  "Sister         pancreatic   • Hyperlipidemia Mother    • Stroke Mother      He  has a past medical history of Allergy, CAD (coronary artery disease), Cholelithiasis, History of bladder infections, History of coronary artery stent placement (8/1/2014), Hyperlipidemia, Hypertension, and Statin intolerance (6/26/2018). He also has no past medical history of Encounter for long-term (current) use of other medications.   History reviewed. No pertinent surgical history.    Exam:   /72 (BP Location: Left arm, Patient Position: Sitting, BP Cuff Size: Adult)   Pulse 60   Temp 36.4 °C (97.5 °F) (Temporal)   Ht 1.676 m (5' 6\")   Wt 73.3 kg (161 lb 8 oz)   SpO2 96%  Body mass index is 26.07 kg/m².    Hearing fair.    Dentition fair  Physical Exam   Constitutional: He is well-developed, well-nourished, and in no distress.   Eyes: No scleral icterus.   Pulmonary/Chest: Effort normal. No respiratory distress.   Neurological: He is alert.   Skin: Skin is warm and dry. No rash noted.   Psychiatric: Mood, affect and judgment normal.        Assessment and Plan. The following treatment and monitoring plan is recommended:        Arcenio is a 81 y.o. male with the following:  Problem List Items Addressed This Visit     Dyslipidemia     Chronic and ongoing problem.  He was mailed samples of Repatha to try but was told his insurance would not pay for it following the new year and so he decided not to take it.  He is not taking anything dedicated for his cholesterol at this time.         Relevant Orders    Lipid Profile    Essential hypertension     Chronic and improved problem.  His blood pressure looks as good as it has in the past 6 months today.  He continues to have stable chronic kidney disease and electrolytes are normal.  Continue current regimen of Terazosin, lisinopril, and carvedilol.  He has been advised to avoid NSAIDs as much as possible as this will increase his blood pressure and cause fluid retention.  Continue " follow-up with cardiology as recommended.         Relevant Orders    CBC WITH DIFFERENTIAL    Vitamin D deficiency     Previous problem, stable.  Vitamin D level sufficient on last check.  Due to his chronic kidney disease we will update again to ensure ongoing stability.         Relevant Orders    VITAMIN D,25 HYDROXY    Bilateral primary osteoarthritis of knee     Chronic and ongoing problem.  He is agreeable to meet with orthopedic surgery at this time.  Will place referral to [facility] to consider steroid injection versus Synvisc.  He would like to avoid total knee replacement at this time if able.  Advised to use Tylenol for pain and to avoid anti-inflammatories due to chronic kidney disease and hypertension.         Relevant Medications    ibuprofen (MOTRIN) 200 MG Tab    Other Relevant Orders    REFERRAL TO ORTHOPEDICS    Nonrheumatic aortic valve insufficiency     Chronic and stable problem.  Updated echocardiogram from September 2020 shows stable aortic insufficiency.  Continue follow-up with cardiology annually as recommended.         H/O meniscectomy of right knee    Relevant Orders    REFERRAL TO ORTHOPEDICS    CKD (chronic kidney disease) stage 3, GFR 30-59 ml/min     Chronic and stable problem.  GFR continues to range in the low 50s.  He knows that he should avoid anti-inflammatories as this will worsen kidney function and stay well-hydrated.  We will continue to follow his kidney function every 6 months.  Appropriate to continue on current regimen, avoid nephrotoxic agents as able.         Relevant Orders    Comp Metabolic Panel    MICROALBUMIN CREAT RATIO URINE    URINALYSIS    Prediabetes     New problem by my evaluation.  Patient has prediabetes.  He eats a fairly well-balanced diet.  No prior exposure to glucose lowering medications in the past.  We will recheck hemoglobin A1c in 6 months as well as urine microalbumin to ensure ongoing stability.  He has chronic kidney disease but this is likely  related to NSAID exposure and hypertension and not prediabetes.         Relevant Orders    HEMOGLOBIN A1C      Other Visit Diagnoses     Encounter for subsequent annual wellness visit (AWV) in Medicare patient    -  Primary    Need for vaccination        Relevant Orders    Shingrix Vaccine (Completed)    Encounter for screening for malignant neoplasm of prostate        Relevant Orders    PROSTATE SPECIFIC AG SCREENING           Services suggested: No services needed at this time  Health Care Screening: Age-appropriate preventive services recommended by USPTF and ACIP covered by Medicare were discussed today. Services ordered if indicated and agreed upon by the patient.  Referrals offered: Community-based lifestyle interventions to reduce health risks and promote self-management and wellness, fall prevention, nutrition, physical activity, tobacco-use cessation, weight loss, and mental health services as per orders if indicated.    Discussion today about general wellness and lifestyle habits:    · Prevent falls and reduce trip hazards; Cautioned about securing or removing rugs.  · Have a working fire alarm and carbon monoxide detector;   · Engage in regular physical activity and social activities     Follow-up: Return in about 6 months (around 9/1/2021).    I spent a total of 45 minutes with record review, exam, communication with the patient, communication with other providers, and documentation of this encounter.       PLEASE NOTE: This dictation was created using voice recognition software. I have made every reasonable attempt to correct obvious errors, but I expect that there are errors of grammar and possibly content that I did not discover before finalizing the note.      Mirna Charlton, DO  Geriatric and Internal Medicine  [facility]

## 2021-03-01 NOTE — ASSESSMENT & PLAN NOTE
Previous problem, stable.  Vitamin D level sufficient on last check.  Due to his chronic kidney disease we will update again to ensure ongoing stability.

## 2021-03-01 NOTE — PATIENT INSTRUCTIONS
Get labs done before you see cardiology next. Message us if you want to come in and have our RN draw them.

## 2021-04-27 ENCOUNTER — PATIENT MESSAGE (OUTPATIENT)
Dept: HEALTH INFORMATION MANAGEMENT | Facility: OTHER | Age: 81
End: 2021-04-27

## 2021-05-03 ENCOUNTER — NON-PROVIDER VISIT (OUTPATIENT)
Dept: MEDICAL GROUP | Facility: PHYSICIAN GROUP | Age: 81
End: 2021-05-03
Payer: MEDICARE

## 2021-05-03 DIAGNOSIS — Z23 NEED FOR VACCINATION: ICD-10-CM

## 2021-05-03 PROCEDURE — 90471 IMMUNIZATION ADMIN: CPT | Performed by: INTERNAL MEDICINE

## 2021-05-03 PROCEDURE — 90750 HZV VACC RECOMBINANT IM: CPT | Performed by: INTERNAL MEDICINE

## 2021-05-03 NOTE — NON-PROVIDER
Pt had first Shingrix 3/1/21. Has been more than 2 weeks since pt received Covid vaccines.   Pt tolerated injection well.

## 2021-08-18 ENCOUNTER — PATIENT OUTREACH (OUTPATIENT)
Dept: HEALTH INFORMATION MANAGEMENT | Facility: OTHER | Age: 81
End: 2021-08-18

## 2021-08-18 NOTE — NON-PROVIDER
Pt's wife called in regarding LEROY. Verified HIPAA. Wife declined exam for patient and stated he was not interested.

## 2021-09-02 DIAGNOSIS — I25.10 CORONARY ARTERY DISEASE INVOLVING NATIVE CORONARY ARTERY OF NATIVE HEART WITHOUT ANGINA PECTORIS: ICD-10-CM

## 2021-09-02 DIAGNOSIS — I10 ESSENTIAL HYPERTENSION: ICD-10-CM

## 2021-09-03 RX ORDER — CARVEDILOL 12.5 MG/1
TABLET ORAL
Qty: 180 TABLET | Refills: 0 | Status: SHIPPED | OUTPATIENT
Start: 2021-09-03 | End: 2022-01-03

## 2021-09-29 ENCOUNTER — TELEPHONE (OUTPATIENT)
Dept: MEDICAL GROUP | Facility: PHYSICIAN GROUP | Age: 81
End: 2021-09-29

## 2021-09-29 NOTE — TELEPHONE ENCOUNTER
ESTABLISHED PATIENT PRE-VISIT PLANNING     Patient was NOT contacted to complete PVP.     Note: Patient will not be contacted if there is no indication to call.     1.  Reviewed notes from the last few office visits within the medical group: Yes    2.  If any orders were placed at last visit or intended to be done for this visit (i.e. 6 mos follow-up), do we have Results/Consult Notes?         •  Labs - Labs ordered, NOT completed. Patient advised to complete prior to next appointment.  Note: If patient appointment is for lab review and patient did not complete labs, check with provider if OK to reschedule patient until labs completed.       •  Imaging - Imaging was not ordered at last office visit.       •  Referrals - Referral ordered, patient has NOT been seen.    3. Is this appointment scheduled as a Hospital Follow-Up? No    4.  Immunizations were updated in Epic using Reconcile Outside Information activity? Yes    5.  Patient is due for the following Health Maintenance Topics:   Health Maintenance Due   Topic Date Due   • Annual Wellness Visit  03/06/2020   • IMM INFLUENZA (1) 09/01/2021       6.  AHA (Pulse8) form printed for Provider? No, already completed

## 2021-09-30 ENCOUNTER — HOSPITAL ENCOUNTER (OUTPATIENT)
Dept: LAB | Facility: MEDICAL CENTER | Age: 81
End: 2021-09-30
Attending: INTERNAL MEDICINE
Payer: MEDICARE

## 2021-09-30 DIAGNOSIS — E78.5 DYSLIPIDEMIA: ICD-10-CM

## 2021-09-30 DIAGNOSIS — N18.31 STAGE 3A CHRONIC KIDNEY DISEASE: ICD-10-CM

## 2021-09-30 DIAGNOSIS — I10 ESSENTIAL HYPERTENSION: ICD-10-CM

## 2021-09-30 DIAGNOSIS — Z12.5 ENCOUNTER FOR SCREENING FOR MALIGNANT NEOPLASM OF PROSTATE: ICD-10-CM

## 2021-09-30 DIAGNOSIS — E55.9 VITAMIN D DEFICIENCY: ICD-10-CM

## 2021-09-30 DIAGNOSIS — R73.03 PREDIABETES: ICD-10-CM

## 2021-09-30 LAB
25(OH)D3 SERPL-MCNC: 31 NG/ML (ref 30–100)
ALBUMIN SERPL BCP-MCNC: 4 G/DL (ref 3.2–4.9)
ALBUMIN/GLOB SERPL: 1.7 G/DL
ALP SERPL-CCNC: 50 U/L (ref 30–99)
ALT SERPL-CCNC: 13 U/L (ref 2–50)
ANION GAP SERPL CALC-SCNC: 8 MMOL/L (ref 7–16)
APPEARANCE UR: CLEAR
AST SERPL-CCNC: 18 U/L (ref 12–45)
BASOPHILS # BLD AUTO: 0.9 % (ref 0–1.8)
BASOPHILS # BLD: 0.04 K/UL (ref 0–0.12)
BILIRUB SERPL-MCNC: 0.7 MG/DL (ref 0.1–1.5)
BILIRUB UR QL STRIP.AUTO: NEGATIVE
BUN SERPL-MCNC: 23 MG/DL (ref 8–22)
CALCIUM SERPL-MCNC: 9.1 MG/DL (ref 8.5–10.5)
CHLORIDE SERPL-SCNC: 106 MMOL/L (ref 96–112)
CHOLEST SERPL-MCNC: 216 MG/DL (ref 100–199)
CO2 SERPL-SCNC: 27 MMOL/L (ref 20–33)
COLOR UR: YELLOW
CREAT SERPL-MCNC: 1.19 MG/DL (ref 0.5–1.4)
CREAT UR-MCNC: 125.87 MG/DL
EOSINOPHIL # BLD AUTO: 0.2 K/UL (ref 0–0.51)
EOSINOPHIL NFR BLD: 4.5 % (ref 0–6.9)
ERYTHROCYTE [DISTWIDTH] IN BLOOD BY AUTOMATED COUNT: 45.7 FL (ref 35.9–50)
EST. AVERAGE GLUCOSE BLD GHB EST-MCNC: 120 MG/DL
FASTING STATUS PATIENT QL REPORTED: NORMAL
GLOBULIN SER CALC-MCNC: 2.3 G/DL (ref 1.9–3.5)
GLUCOSE SERPL-MCNC: 104 MG/DL (ref 65–99)
GLUCOSE UR STRIP.AUTO-MCNC: NEGATIVE MG/DL
HBA1C MFR BLD: 5.8 % (ref 4–5.6)
HCT VFR BLD AUTO: 42.9 % (ref 42–52)
HDLC SERPL-MCNC: 56 MG/DL
HGB BLD-MCNC: 14.7 G/DL (ref 14–18)
IMM GRANULOCYTES # BLD AUTO: 0.01 K/UL (ref 0–0.11)
IMM GRANULOCYTES NFR BLD AUTO: 0.2 % (ref 0–0.9)
KETONES UR STRIP.AUTO-MCNC: NEGATIVE MG/DL
LDLC SERPL CALC-MCNC: 145 MG/DL
LEUKOCYTE ESTERASE UR QL STRIP.AUTO: NEGATIVE
LYMPHOCYTES # BLD AUTO: 0.87 K/UL (ref 1–4.8)
LYMPHOCYTES NFR BLD: 19.5 % (ref 22–41)
MCH RBC QN AUTO: 32 PG (ref 27–33)
MCHC RBC AUTO-ENTMCNC: 34.3 G/DL (ref 33.7–35.3)
MCV RBC AUTO: 93.5 FL (ref 81.4–97.8)
MICRO URNS: NORMAL
MICROALBUMIN UR-MCNC: 1.6 MG/DL
MICROALBUMIN/CREAT UR: 13 MG/G (ref 0–30)
MONOCYTES # BLD AUTO: 0.44 K/UL (ref 0–0.85)
MONOCYTES NFR BLD AUTO: 9.8 % (ref 0–13.4)
NEUTROPHILS # BLD AUTO: 2.91 K/UL (ref 1.82–7.42)
NEUTROPHILS NFR BLD: 65.1 % (ref 44–72)
NITRITE UR QL STRIP.AUTO: NEGATIVE
NRBC # BLD AUTO: 0 K/UL
NRBC BLD-RTO: 0 /100 WBC
PH UR STRIP.AUTO: 5.5 [PH] (ref 5–8)
PLATELET # BLD AUTO: 141 K/UL (ref 164–446)
PMV BLD AUTO: 11.7 FL (ref 9–12.9)
POTASSIUM SERPL-SCNC: 4.1 MMOL/L (ref 3.6–5.5)
PROT SERPL-MCNC: 6.3 G/DL (ref 6–8.2)
PROT UR QL STRIP: NEGATIVE MG/DL
PSA SERPL-MCNC: 4.1 NG/ML (ref 0–4)
RBC # BLD AUTO: 4.59 M/UL (ref 4.7–6.1)
RBC UR QL AUTO: NEGATIVE
SODIUM SERPL-SCNC: 141 MMOL/L (ref 135–145)
SP GR UR STRIP.AUTO: 1.02
TRIGL SERPL-MCNC: 74 MG/DL (ref 0–149)
UROBILINOGEN UR STRIP.AUTO-MCNC: 0.2 MG/DL
WBC # BLD AUTO: 4.5 K/UL (ref 4.8–10.8)

## 2021-09-30 PROCEDURE — 85025 COMPLETE CBC W/AUTO DIFF WBC: CPT

## 2021-09-30 PROCEDURE — 36415 COLL VENOUS BLD VENIPUNCTURE: CPT

## 2021-09-30 PROCEDURE — 84153 ASSAY OF PSA TOTAL: CPT

## 2021-09-30 PROCEDURE — 81003 URINALYSIS AUTO W/O SCOPE: CPT

## 2021-09-30 PROCEDURE — 83036 HEMOGLOBIN GLYCOSYLATED A1C: CPT

## 2021-09-30 PROCEDURE — 80061 LIPID PANEL: CPT

## 2021-09-30 PROCEDURE — 82043 UR ALBUMIN QUANTITATIVE: CPT

## 2021-09-30 PROCEDURE — 82306 VITAMIN D 25 HYDROXY: CPT

## 2021-09-30 PROCEDURE — 82570 ASSAY OF URINE CREATININE: CPT

## 2021-09-30 PROCEDURE — 80053 COMPREHEN METABOLIC PANEL: CPT

## 2021-10-04 ENCOUNTER — OFFICE VISIT (OUTPATIENT)
Dept: MEDICAL GROUP | Facility: PHYSICIAN GROUP | Age: 81
End: 2021-10-04
Payer: MEDICARE

## 2021-10-04 VITALS
TEMPERATURE: 98.5 F | SYSTOLIC BLOOD PRESSURE: 116 MMHG | WEIGHT: 153.6 LBS | DIASTOLIC BLOOD PRESSURE: 68 MMHG | OXYGEN SATURATION: 87 % | HEIGHT: 66 IN | BODY MASS INDEX: 24.68 KG/M2 | HEART RATE: 56 BPM

## 2021-10-04 DIAGNOSIS — M17.0 BILATERAL PRIMARY OSTEOARTHRITIS OF KNEE: ICD-10-CM

## 2021-10-04 DIAGNOSIS — E78.5 DYSLIPIDEMIA: ICD-10-CM

## 2021-10-04 DIAGNOSIS — N18.31 STAGE 3A CHRONIC KIDNEY DISEASE: ICD-10-CM

## 2021-10-04 DIAGNOSIS — Z23 NEED FOR VACCINATION: ICD-10-CM

## 2021-10-04 DIAGNOSIS — I10 ESSENTIAL HYPERTENSION: ICD-10-CM

## 2021-10-04 DIAGNOSIS — N40.0 BENIGN PROSTATIC HYPERPLASIA, UNSPECIFIED WHETHER LOWER URINARY TRACT SYMPTOMS PRESENT: ICD-10-CM

## 2021-10-04 DIAGNOSIS — R73.03 PREDIABETES: ICD-10-CM

## 2021-10-04 PROCEDURE — 90662 IIV NO PRSV INCREASED AG IM: CPT | Performed by: INTERNAL MEDICINE

## 2021-10-04 PROCEDURE — G0008 ADMIN INFLUENZA VIRUS VAC: HCPCS | Performed by: INTERNAL MEDICINE

## 2021-10-04 PROCEDURE — 99214 OFFICE O/P EST MOD 30 MIN: CPT | Mod: 25 | Performed by: INTERNAL MEDICINE

## 2021-10-04 RX ORDER — LISINOPRIL 10 MG/1
TABLET ORAL
COMMUNITY
End: 2021-10-04

## 2021-10-04 RX ORDER — GABAPENTIN 100 MG/1
100 CAPSULE ORAL 2 TIMES DAILY PRN
Qty: 60 CAPSULE | Refills: 1 | Status: SHIPPED | OUTPATIENT
Start: 2021-10-04 | End: 2021-11-30

## 2021-10-04 ASSESSMENT — FIBROSIS 4 INDEX: FIB4 SCORE: 2.87

## 2021-10-04 NOTE — PROGRESS NOTES
Subjective:   Chief Complaint/History of Present Illness:  Arcenio Barnett is a 81 y.o. male established patient who presents today to discuss medical problems as listed below. Arcenio is accompanied by his wife, Linh.    Problem   Prediabetes       Ref. Range 9/30/2021 09:53   Glycohemoglobin Latest Ref Range: 4.0 - 5.6 % 5.8 (H)   Estim. Avg Glu Latest Units: mg/dL 120     He had mild glucose intolerance on previous evaluations.  No use of pharmacotherapy to lower blood sugar in the past.  His A1c is 5.8, no previous to compare with.  One of his children has type 2 diabetes requiring insulin.     CKD (chronic kidney disease) stage 3, GFR 30-59 ml/min (HCC)       Ref. Range 9/30/2021 09:53   Bun Latest Ref Range: 8 - 22 mg/dL 23 (H)   Creatinine Latest Ref Range: 0.50 - 1.40 mg/dL 1.19   GFR If Non  Latest Ref Range: >60 mL/min/1.73 m 2 59 (A)     He has evidence of chronic kidney disease on lab work since approximately 2014. He has a history of coronary artery disease on lisinopril but otherwise no hypertensive medicines that are renally metabolized.  He previously used ibuprofen 200 mg daily.  No known history of nephrolithiasis or diabetes.  He had an exophytic lesion on the kidney noted on an old image in 2012 and it does not appear this was ever followed up on. He admits to daily use of Advil.     CT abdomen (11/2012):  1 cm exophytic lesion off the upper pole left kidney may represent hyperdense cyst though solid renal mass cannot be excluded.  Recommend follow-up renal ultrasound.     Bilateral Primary Osteoarthritis of Knee    Right Knee Xray (10/2014):  Bone density is normal.  There is no evidence of fracture or dislocation.  Moderate joint space narrowing periventricular sclerosis and marginal spurring is identified in the right knee joint is most prominent in the medial compartment and patellofemoral joint.  There is no joint effusion.    He had moderate osteoarthritis noted of the  right knee in 2014.  He reports around that time he was referred to orthopedic surgery to discuss total knee arthroplasty.  He reports that it was too cost prohibitive and so he is not able to pursue surgical correction.  He reports receiving a pain medication at that time, Norco 5-325 mg, which he took once daily and provided significant relief of the knees.  He never came back to have it refilled with his previous primary care provider.  In the meantime he has been taking Advil over-the-counter 200 mg daily.  He does have a history of chronic kidney disease, GFR is stable.    He saw Dr. Fermin with orthopedics and had about 2 weeks of improvement with steroid injection and no improvement and actually worsening of symptoms with trial of Synvisc injections.  He continues to use anti-inflammatories as Tylenol is not effective.  He would be interested in adding another medicine to help with knee pain.    Current regimen: Anti-inflammatories as needed, initiate gabapentin 100 mg twice daily (titrate up as needed, he will send an update in mid October 2021)     Benign Prostatic Hyperplasia       Ref. Range 2/19/2020 12:54 9/30/2021 09:53   Prostatic Specific Antigen Tot Latest Ref Range: 0.00 - 4.00 ng/mL 4.03 (H) 4.10 (H)     He reports longstanding history of lower urinary tract symptoms secondary to enlarged prostate.  He has been maintained on terazosin for both blood pressure control and prostate issues since approximately 2010.  He notes that in [id number] he develop progressive worsening of lower urinary tract symptoms.  He has nocturia about every 90 minutes overnight as well as urgency with changing position during the day.  He is not yet experiencing any incontinence.  No issues with lightheadedness or dizziness.  No hematuria.  No dysuria.    Stopped Terazosin and initiated Flomax in January 2020, only minimal benefit noted and patient reverted back to terazosin.     Essential Hypertension    He has a  history of hypertension on treatment. He denies any chest pain, dyspnea on exertion, or palpitations.  No lightheadedness or dizziness to suggest orthostasis.  He is also taking terazosin for both hypertension and enlarged prostate.    He admits to taking ibuprofen daily which likely contributes to fluctuations in his blood pressure    Current regimen: carvedilol 12.5 mg BID, terazosin 10 mg daily, and lisinopril 5 mg daily.     Blood pressure in clinic ranges 116-128/68-72     Dyslipidemia       Ref. Range 2/19/2020 12:54 9/30/2021 09:53   Cholesterol,Tot Latest Ref Range: 100 - 199 mg/dL 207 (H) 216 (H)   Triglycerides Latest Ref Range: 0 - 149 mg/dL 91 74   HDL Latest Ref Range: >=40 mg/dL 54 56   LDL Latest Ref Range: <100 mg/dL 135 (H) 145 (H)       Patient has a history of hyperlipidemia and associated coronary artery disease.  He was previously on statin therapy however this was discontinued due to myalgias and arthralgias.  Cardiology had mentioned in a previous note initiated him on PSCK9 inhibitor therapy, but does not look like this has happened yet. He is on a baby aspirin daily.         Current Medications:  Current Outpatient Medications Ordered in Epic   Medication Sig Dispense Refill   • gabapentin (NEURONTIN) 100 MG Cap Take 1 Capsule by mouth 2 times a day as needed. Bilateral knee pain, leg cramps, foot burning 60 Capsule 1   • carvedilol (COREG) 12.5 MG Tab TAKE 1 TABLET BY MOUTH TWICE DAILY WITH MEALS 180 Tablet 0   • ibuprofen (MOTRIN) 200 MG Tab Take 200 mg by mouth every 8 hours as needed for Mild Pain (knee pain).     • terazosin (HYTRIN) 10 MG capsule Take 1 Cap by mouth every day. 100 Cap 3   • lisinopril (PRINIVIL) 5 MG Tab Take 1 Tab by mouth every day. 100 Tab 2   • Multiple Vitamin (ONE-A-DAY 55 PLUS PO) Take  by mouth.     • aspirin (ASA) 81 MG Chew Tab chewable tablet Take 81 mg by mouth every day.       No current [facility]-ordered facility-administered medications on file.           "Objective:   Physical Exam:    Vitals: /68 (BP Location: Left arm, Patient Position: Sitting, BP Cuff Size: Adult)   Pulse (!) 56   Temp 36.9 °C (98.5 °F) (Temporal)   Ht 1.676 m (5' 6\")   Wt 69.7 kg (153 lb 9.6 oz)   SpO2 (!) 87%    BMI: Body mass index is 24.79 kg/m².  Physical Exam  Constitutional:       General: He is not in acute distress.     Appearance: Normal appearance. He is not ill-appearing or toxic-appearing.   HENT:      Right Ear: There is no impacted cerumen.      Left Ear: There is no impacted cerumen.   Eyes:      General: No scleral icterus.     Conjunctiva/sclera: Conjunctivae normal.   Cardiovascular:      Rate and Rhythm: Regular rhythm. Bradycardia present.      Pulses: Normal pulses.      Heart sounds: No murmur heard.     Pulmonary:      Effort: Pulmonary effort is normal. No respiratory distress.      Breath sounds: Normal breath sounds. No wheezing.   Abdominal:      Palpations: Abdomen is soft.      Tenderness: There is no abdominal tenderness.   Musculoskeletal:      Right lower leg: No edema.      Left lower leg: No edema.      Comments: Pain in bilateral knees with active ROM   Skin:     Findings: No bruising or rash.   Neurological:      Gait: Gait abnormal.   Psychiatric:         Mood and Affect: Mood normal.         Behavior: Behavior normal.         Thought Content: Thought content normal.         Judgment: Judgment normal.          Assessment and Plan:   Arcenio is a 81 y.o. male with the following:  Problem List Items Addressed This Visit     Dyslipidemia     Chronic and ongoing problem.  Does not tolerate statin therapy.  He is on low-dose aspirin, continue.  He is established with cardiology.  They had made previous notes about starting him on Repatha or Praluent, this has not yet happened.         Essential hypertension     Chronic and ongoing problem.  Continue current regimen of carvedilol, Terazosin, lisinopril.  Continue follow-up with cardiology as recommended.  " Electrolytes and mild CKD stage IIIa are stable.         Relevant Orders    Comp Metabolic Panel    Benign prostatic hyperplasia     Chronic and ongoing problem.  Still with nocturia and lower urinary tract symptoms.  PSA relatively stable.  Continue with Terazosin.  Trial of Flomax did not add much improvement for him.         Bilateral primary osteoarthritis of knee     Chronic and ongoing problem.  Continue to limit anti-inflammatories as able.  Tylenol unfortunately is not effective for him.  He had steroid injections with only short-lived improvement of about 2 weeks.  Synvisc did not lead to any improvement in the knees and actually cause worsening radiculopathy and neuropathy in his lower legs with cramping in his calves and burning of his feet.  Will trial gabapentin 100 mg twice daily.  Advised him to monitor for oversedation and that if he would experience that during the daytime we could always use it overnight for the leg cramps and burning of his feet.  Advised him to update me in 1 to 2 weeks and we could increase the dose as needed.  Also discussed that he could seek out genicular neurotomy/radiofrequency ablation evaluation versus peripheral nerve stimulator for his knee pain.  He reports a bill of over $1700 for the 3 Synvisc injections and is hesitant to proceed with other procedures.         Relevant Medications    gabapentin (NEURONTIN) 100 MG Cap    CKD (chronic kidney disease) stage 3, GFR 30-59 ml/min ([facility])     Chronic and ongoing problem.  GFR slightly improved.  Continue to limit anti-inflammatories as able.  Continue with good control of blood pressure and heart rate.  Will monitor again in 6 months when he returns to clinic.         Prediabetes     Chronic and ongoing problem.  A1c improved, barely prediabetic.  Continue with lifestyle management.  No indication to start pharmacotherapy.         Relevant Orders    HEMOGLOBIN A1C      Other Visit Diagnoses     Need for vaccination         Relevant Orders    Influenza Vaccine, High Dose (65+ Only) (Completed)           RTC: Return in about 6 months (around 4/4/2022) for add on lab draw same day just before or after.    I spent a total of 37 minutes with record review, exam, communication with the patient, communication with other providers, and documentation of this encounter.    PLEASE NOTE: This dictation was created using voice recognition software. I have made every reasonable attempt to correct obvious errors, but I expect that there are errors of grammar and possibly content that I did not discover before finalizing the note.      Mirna Charlton, DO  Geriatric and Internal Medicine  [facility]

## 2021-10-04 NOTE — ASSESSMENT & PLAN NOTE
Chronic and ongoing problem.  GFR slightly improved.  Continue to limit anti-inflammatories as able.  Continue with good control of blood pressure and heart rate.  Will monitor again in 6 months when he returns to clinic.

## 2021-10-04 NOTE — ASSESSMENT & PLAN NOTE
Chronic and ongoing problem.  Does not tolerate statin therapy.  He is on low-dose aspirin, continue.  He is established with cardiology.  They had made previous notes about starting him on Repatha or Praluent, this has not yet happened.

## 2021-10-04 NOTE — ASSESSMENT & PLAN NOTE
Chronic and ongoing problem.  Continue current regimen of carvedilol, Terazosin, lisinopril.  Continue follow-up with cardiology as recommended.  Electrolytes and mild CKD stage IIIa are stable.

## 2021-10-04 NOTE — ASSESSMENT & PLAN NOTE
Chronic and ongoing problem.  Continue to limit anti-inflammatories as able.  Tylenol unfortunately is not effective for him.  He had steroid injections with only short-lived improvement of about 2 weeks.  Synvisc did not lead to any improvement in the knees and actually cause worsening radiculopathy and neuropathy in his lower legs with cramping in his calves and burning of his feet.  Will trial gabapentin 100 mg twice daily.  Advised him to monitor for oversedation and that if he would experience that during the daytime we could always use it overnight for the leg cramps and burning of his feet.  Advised him to update me in 1 to 2 weeks and we could increase the dose as needed.  Also discussed that he could seek out genicular neurotomy/radiofrequency ablation evaluation versus peripheral nerve stimulator for his knee pain.  He reports a bill of over $1700 for the 3 Synvisc injections and is hesitant to proceed with other procedures.

## 2021-10-04 NOTE — ASSESSMENT & PLAN NOTE
Chronic and ongoing problem.  A1c improved, barely prediabetic.  Continue with lifestyle management.  No indication to start pharmacotherapy.

## 2021-10-04 NOTE — ASSESSMENT & PLAN NOTE
Chronic and ongoing problem.  Still with nocturia and lower urinary tract symptoms.  PSA relatively stable.  Continue with Terazosin.  Trial of Flomax did not add much improvement for him.

## 2021-10-25 ENCOUNTER — HOSPITAL ENCOUNTER (EMERGENCY)
Facility: MEDICAL CENTER | Age: 81
End: 2021-10-25
Attending: EMERGENCY MEDICINE
Payer: MEDICARE

## 2021-10-25 ENCOUNTER — APPOINTMENT (OUTPATIENT)
Dept: RADIOLOGY | Facility: MEDICAL CENTER | Age: 81
End: 2021-10-25
Attending: EMERGENCY MEDICINE
Payer: MEDICARE

## 2021-10-25 ENCOUNTER — PATIENT MESSAGE (OUTPATIENT)
Dept: MEDICAL GROUP | Facility: PHYSICIAN GROUP | Age: 81
End: 2021-10-25

## 2021-10-25 VITALS
DIASTOLIC BLOOD PRESSURE: 61 MMHG | TEMPERATURE: 97.7 F | RESPIRATION RATE: 16 BRPM | WEIGHT: 156.53 LBS | OXYGEN SATURATION: 99 % | BODY MASS INDEX: 24.57 KG/M2 | HEIGHT: 67 IN | SYSTOLIC BLOOD PRESSURE: 133 MMHG | HEART RATE: 56 BPM

## 2021-10-25 DIAGNOSIS — R33.9 URINARY RETENTION: ICD-10-CM

## 2021-10-25 DIAGNOSIS — N40.1 BENIGN PROSTATIC HYPERPLASIA (BPH) WITH URINARY URGENCY: ICD-10-CM

## 2021-10-25 DIAGNOSIS — R39.15 BENIGN PROSTATIC HYPERPLASIA (BPH) WITH URINARY URGENCY: ICD-10-CM

## 2021-10-25 LAB
ALBUMIN SERPL BCP-MCNC: 4.2 G/DL (ref 3.2–4.9)
ALBUMIN/GLOB SERPL: 1.7 G/DL
ALP SERPL-CCNC: 53 U/L (ref 30–99)
ALT SERPL-CCNC: 11 U/L (ref 2–50)
ANION GAP SERPL CALC-SCNC: 14 MMOL/L (ref 7–16)
APPEARANCE UR: CLEAR
AST SERPL-CCNC: 19 U/L (ref 12–45)
BACTERIA #/AREA URNS HPF: NEGATIVE /HPF
BASOPHILS # BLD AUTO: 0.5 % (ref 0–1.8)
BASOPHILS # BLD: 0.04 K/UL (ref 0–0.12)
BILIRUB SERPL-MCNC: 0.7 MG/DL (ref 0.1–1.5)
BILIRUB UR QL STRIP.AUTO: NEGATIVE
BUN SERPL-MCNC: 24 MG/DL (ref 8–22)
CALCIUM SERPL-MCNC: 9.2 MG/DL (ref 8.4–10.2)
CHLORIDE SERPL-SCNC: 103 MMOL/L (ref 96–112)
CO2 SERPL-SCNC: 23 MMOL/L (ref 20–33)
COLOR UR: YELLOW
CREAT SERPL-MCNC: 1.17 MG/DL (ref 0.5–1.4)
EOSINOPHIL # BLD AUTO: 0.09 K/UL (ref 0–0.51)
EOSINOPHIL NFR BLD: 1.1 % (ref 0–6.9)
EPI CELLS #/AREA URNS HPF: NEGATIVE /HPF
ERYTHROCYTE [DISTWIDTH] IN BLOOD BY AUTOMATED COUNT: 48.1 FL (ref 35.9–50)
GLOBULIN SER CALC-MCNC: 2.5 G/DL (ref 1.9–3.5)
GLUCOSE SERPL-MCNC: 121 MG/DL (ref 65–99)
GLUCOSE UR STRIP.AUTO-MCNC: NEGATIVE MG/DL
HCT VFR BLD AUTO: 45.7 % (ref 42–52)
HGB BLD-MCNC: 15.2 G/DL (ref 14–18)
HYALINE CASTS #/AREA URNS LPF: ABNORMAL /LPF
IMM GRANULOCYTES # BLD AUTO: 0.05 K/UL (ref 0–0.11)
IMM GRANULOCYTES NFR BLD AUTO: 0.6 % (ref 0–0.9)
KETONES UR STRIP.AUTO-MCNC: NEGATIVE MG/DL
LEUKOCYTE ESTERASE UR QL STRIP.AUTO: NEGATIVE
LYMPHOCYTES # BLD AUTO: 0.77 K/UL (ref 1–4.8)
LYMPHOCYTES NFR BLD: 9 % (ref 22–41)
MCH RBC QN AUTO: 31.7 PG (ref 27–33)
MCHC RBC AUTO-ENTMCNC: 33.3 G/DL (ref 33.7–35.3)
MCV RBC AUTO: 95.2 FL (ref 81.4–97.8)
MICRO URNS: ABNORMAL
MONOCYTES # BLD AUTO: 0.61 K/UL (ref 0–0.85)
MONOCYTES NFR BLD AUTO: 7.2 % (ref 0–13.4)
MUCOUS THREADS #/AREA URNS HPF: ABNORMAL /HPF
NEUTROPHILS # BLD AUTO: 6.96 K/UL (ref 1.82–7.42)
NEUTROPHILS NFR BLD: 81.6 % (ref 44–72)
NITRITE UR QL STRIP.AUTO: NEGATIVE
NRBC # BLD AUTO: 0 K/UL
NRBC BLD-RTO: 0 /100 WBC
PH UR STRIP.AUTO: 6.5 [PH] (ref 5–8)
PLATELET # BLD AUTO: 128 K/UL (ref 164–446)
PMV BLD AUTO: 11.9 FL (ref 9–12.9)
POTASSIUM SERPL-SCNC: 4.4 MMOL/L (ref 3.6–5.5)
PROT SERPL-MCNC: 6.7 G/DL (ref 6–8.2)
PROT UR QL STRIP: NEGATIVE MG/DL
RBC # BLD AUTO: 4.8 M/UL (ref 4.7–6.1)
RBC # URNS HPF: ABNORMAL /HPF
RBC UR QL AUTO: ABNORMAL
SODIUM SERPL-SCNC: 140 MMOL/L (ref 135–145)
SP GR UR STRIP.AUTO: 1.01
WBC # BLD AUTO: 8.5 K/UL (ref 4.8–10.8)
WBC #/AREA URNS HPF: ABNORMAL /HPF

## 2021-10-25 PROCEDURE — 36415 COLL VENOUS BLD VENIPUNCTURE: CPT

## 2021-10-25 PROCEDURE — 303105 HCHG CATHETER EXTRA

## 2021-10-25 PROCEDURE — 81001 URINALYSIS AUTO W/SCOPE: CPT

## 2021-10-25 PROCEDURE — 99284 EMERGENCY DEPT VISIT MOD MDM: CPT

## 2021-10-25 PROCEDURE — 51798 US URINE CAPACITY MEASURE: CPT

## 2021-10-25 PROCEDURE — 85025 COMPLETE CBC W/AUTO DIFF WBC: CPT

## 2021-10-25 PROCEDURE — 51702 INSERT TEMP BLADDER CATH: CPT | Mod: XU

## 2021-10-25 PROCEDURE — 76775 US EXAM ABDO BACK WALL LIM: CPT

## 2021-10-25 PROCEDURE — 80053 COMPREHEN METABOLIC PANEL: CPT

## 2021-10-25 ASSESSMENT — FIBROSIS 4 INDEX: FIB4 SCORE: 2.87

## 2021-10-25 NOTE — ED PROVIDER NOTES
"ED Provider Note  CHIEF COMPLAINT  Chief Complaint   Patient presents with   • Difficulty Urinating     since 10pm       HPI  Arcenio Barnett is a 81 y.o. male who presents to the ER with complaint of inability to urinate for the last 10 to 12 hours.  He says the last time he urinated normally was about 10 PM last night.  Since then he has just been \"dribbling.\"  Patient reports he had similar issues with urinary retention about 10 years ago.  He has history of prostate problems.  He has been on Terazosin for many years.  No new medication changes.  He has not been taking any cough or cold medications lately.  No recent surgeries.  No back pain.  No recent back injuries.  No radiating pain down the legs.  No numbness, tingling or weakness of the extremities.  No pain with urination prior to onset of urinary retention.    REVIEW OF SYSTEMS  See HPI for further details. All other systems are negative.    PAST MEDICAL HISTORY  Past Medical History:   Diagnosis Date   • Allergy    • CAD (coronary artery disease)     cardiac stents x 2 2000, 2007 [place] 2007 prox LAD   • Cholelithiasis    • History of bladder infections    • History of coronary artery stent placement 8/1/2014    Prox LAD, also RCA   • Hyperlipidemia    • Hypertension    • Statin intolerance 6/26/2018       FAMILY HISTORY  Family History   Problem Relation Age of Onset   • Cancer Father 57        lung cancer   • Cancer Brother 52        lung cancer   • Cancer Sister         breast   • Cancer Sister         breast   • Cancer Sister         pancreatic   • Hyperlipidemia Mother    • Stroke Mother        SOCIAL HISTORY  Social History     Socioeconomic History   • Marital status:      Spouse name: Not on file   • Number of children: Not on file   • Years of education: Not on file   • Highest education level: Not on file   Occupational History   • Not on file   Tobacco Use   • Smoking status: Never Smoker   • Smokeless tobacco: Never " "Used   • Tobacco comment: continued abstinence   Vaping Use   • Vaping Use: Never used   Substance and Sexual Activity   • Alcohol use: No     Alcohol/week: 0.0 oz   • Drug use: No   • Sexual activity: Never     Partners: Female   Other Topics Concern   • Not on file   Social History Narrative    He is  to Linh. He relocated to [place] around 2015 from a small town in [place]. He is dissatisfied with living in [place]     Social Determinants of Health     Financial Resource Strain:    • Difficulty of Paying Living Expenses:    Food Insecurity:    • Worried About Running Out of Food in the Last Year:    • Ran Out of Food in the Last Year:    Transportation Needs:    • Lack of Transportation (Medical):    • Lack of Transportation (Non-Medical):    Physical Activity:    • Days of Exercise per Week:    • Minutes of Exercise per Session:    Stress:    • Feeling of Stress :    Social Connections:    • Frequency of Communication with Friends and Family:    • Frequency of Social Gatherings with Friends and Family:    • Attends [employer] Services:    • Active Member of Clubs or Organizations:    • Attends Club or Organization Meetings:    • Marital Status:    Intimate Partner Violence:    • Fear of Current or Ex-Partner:    • Emotionally Abused:    • Physically Abused:    • Sexually Abused:        SURGICAL HISTORY  History reviewed. No pertinent surgical history.    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        PHYSICAL EXAM  VITAL SIGNS: /82   Pulse (!) 52   Temp (!) 35.6 °C (96.1 °F) (Temporal)   Resp 18   Ht 1.702 m (5' 7\")   Wt 71 kg (156 lb 8.4 oz)   SpO2 98%   BMI 24.52 kg/m²      Constitutional: Well developed, well nourished; No acute distress; Non-toxic " appearance.   HENT: Normocephalic, atraumatic; Bilateral external ears normal; oropharyngeal examination deferred due to COVID-19 outbreak and lack of oral pharyngeal complaint  Eyes: PERRL, EOMI, Conjunctiva normal. No discharge.   Neck:  Supple, nontender midline; No stridor; No nuchal rigidity.   Cardiovascular: Regular rate and rhythm without murmurs, rubs, or gallop.   Thorax & Lungs: No respiratory distress, breath sounds clear to auscultation bilaterally without wheezing, rales or rhonchi. Nontender chest wall. No crepitus or subcutaneous air  Abdomen: Soft, distended bladder prior to placement of Portillo catheter.  Abdomen was soft, nontender and without any abdominal distention after placement of Portillo catheter.  Bowel sounds normal. No obvious masses; No pulsatile masses; no rebound, guarding, or peritoneal signs.   Skin: Good color; warm and dry without rash or petechia.  Back: Nontender, No CVA tenderness.   Extremities: Distal radial, dorsalis pedis, posterior tibial pulses are equal bilaterally; No edema; Nontender calves or saphenous, No cyanosis, No clubbing.   Musculoskeletal: Good range of motion in all major joints. No tenderness to palpation or major deformities noted.   Neurologic: Alert & oriented x 4, clear speech,  lower extremity strength are 5 out of 5 and equal bilaterally testing of dorsiflexors and plantar flexors.  Sensation is intact to light touch.      RADIOLOGY/PROCEDURES  US-RENAL   Final Result      1.  Negative for hydronephrosis      2.  Bladder is decompressed with a catheter      3.  Nonobstructive 3 mm right upper pole calculus      4.  Incidental left renal cyst        Of note, there is significant delay in getting radiology read due to a massive downtime affecting [facility] and all radiology platforms.    COURSE & MEDICAL DECISION MAKING  Pertinent Labs & Imaging studies reviewed. (See chart for details)    Results for orders placed or performed during the hospital encounter of  10/25/21   Comp Metabolic Panel   Result Value Ref Range    Sodium 140 135 - 145 mmol/L    Potassium 4.4 3.6 - 5.5 mmol/L    Chloride 103 96 - 112 mmol/L    Co2 23 20 - 33 mmol/L    Anion Gap 14.0 7.0 - 16.0    Glucose 121 (H) 65 - 99 mg/dL    Bun 24 (H) 8 - 22 mg/dL    Creatinine 1.17 0.50 - 1.40 mg/dL    Calcium 9.2 8.4 - 10.2 mg/dL    AST(SGOT) 19 12 - 45 U/L    ALT(SGPT) 11 2 - 50 U/L    Alkaline Phosphatase 53 30 - 99 U/L    Total Bilirubin 0.7 0.1 - 1.5 mg/dL    Albumin 4.2 3.2 - 4.9 g/dL    Total Protein 6.7 6.0 - 8.2 g/dL    Globulin 2.5 1.9 - 3.5 g/dL    A-G Ratio 1.7 g/dL   CBC WITH DIFFERENTIAL   Result Value Ref Range    WBC 8.5 4.8 - 10.8 K/uL    RBC 4.80 4.70 - 6.10 M/uL    Hemoglobin 15.2 14.0 - 18.0 g/dL    Hematocrit 45.7 42.0 - 52.0 %    MCV 95.2 81.4 - 97.8 fL    MCH 31.7 27.0 - 33.0 pg    MCHC 33.3 (L) 33.7 - 35.3 g/dL    RDW 48.1 35.9 - 50.0 fL    Platelet Count 128 (L) 164 - 446 K/uL    MPV 11.9 9.0 - 12.9 fL    Neutrophils-Polys 81.60 (H) 44.00 - 72.00 %    Lymphocytes 9.00 (L) 22.00 - 41.00 %    Monocytes 7.20 0.00 - 13.40 %    Eosinophils 1.10 0.00 - 6.90 %    Basophils 0.50 0.00 - 1.80 %    Immature Granulocytes 0.60 0.00 - 0.90 %    Nucleated RBC 0.00 /100 WBC    Neutrophils (Absolute) 6.96 1.82 - 7.42 K/uL    Lymphs (Absolute) 0.77 (L) 1.00 - 4.80 K/uL    Monos (Absolute) 0.61 0.00 - 0.85 K/uL    Eos (Absolute) 0.09 0.00 - 0.51 K/uL    Baso (Absolute) 0.04 0.00 - 0.12 K/uL    Immature Granulocytes (abs) 0.05 0.00 - 0.11 K/uL    NRBC (Absolute) 0.00 K/uL   URINALYSIS,CULTURE IF INDICATED   Result Value Ref Range    Color Yellow     Character Clear     Specific Gravity 1.010 <1.035    Ph 6.5 5.0 - 8.0    Glucose Negative Negative mg/dL    Ketones Negative Negative mg/dL    Protein Negative Negative mg/dL    Bilirubin Negative Negative    Nitrite Negative Negative    Leukocyte Esterase Negative Negative    Occult Blood Large (A) Negative    Micro Urine Req Microscopic    URINE MICROSCOPIC  (W/UA)   Result Value Ref Range    WBC 0-2 (A) /hpf    RBC  (A) /hpf    Bacteria Negative None /hpf    Epithelial Cells Negative Few /hpf    Mucous Threads Rare /hpf    Hyaline Cast 0-2 /lpf   ESTIMATED GFR   Result Value Ref Range    GFR If African American >60 >60 mL/min/1.73 m 2    GFR If Non African American 60 >60 mL/min/1.73 m 2     1830: Discussed with Dr. Bragg, urologist on-call.  He would like  the Portillo catheter to stay in for 1 week.  He will see the patient in consultation in the office in a week.  Patient should call tomorrow for appointment.      Patient presents to the ER with difficulty urinating.  He last urinated normally at 10 PM last night.  Since then he is only been dribbling.  He describes bladder distention.  843 mL was seen on bladder scan upon arrival.  A Portillo catheter was placed in we got out a little over 750 cc.  Patient says he had urinary retention about 10 years ago.  He has a known enlarged prostate.  No back pain.  No back injury.  No radiating pain down legs.  No complaints of numbness, tingling or weakness of extremities.  His neurologic examination is normal.  At this time no concern for cord compression or cauda equina syndrome.  I suspect his urinary retention is likely secondary to his prostate.  He denies taking any over-the-counter cough or cold medicines lately.  No new medication changes.  He denies feeling poorly or having any dysuria or hematuria prior to onset of urinary retention.  His urine is clear other than microscopic blood, which is likely secondary to placement of Portillo catheter.  At this time no evidence for urinary tract infection.  Renal function is normal.  Ultrasound of the kidneys does not reveal any hydronephrosis.  Patient feels much better after placement of Portillo catheter.  Abdomen is soft and nontender after placement of Portillo catheter.  Vital signs are normal stable.  Patient is not septic or toxic.  I think he is safe and stable for  outpatient management discharge home.  I spoke with urology on-call.  He would like the catheter to stay in for about a week.  He will see the patient in consultation in 1 week.  Patient is to call tomorrow for appointment.  He has been given strict return precautions and discharge instructions and he understands treatment plan and follow-up.    I verified that the patient was wearing a mask and I was wearing appropriate PPE every time I entered the room. The patient's mask was on the patient at all times during my encounter     FINAL IMPRESSION  1. Urinary retention Acute        This dictation has been created using voice recognition software. The accuracy of the dictation is limited by the abilities of the software. I expect there may be some errors of grammar and possibly content. I made every attempt to manually correct the errors within my dictation. However, errors related to voice recognition software may still exist and should be interpreted within the appropriate context.  Electronically signed by: Gila Gomez M.D., 10/25/2021 3:07 PM

## 2021-10-25 NOTE — PATIENT COMMUNICATION
Spoke w/Latesha, received VO for urinalysis in response to pt report of sx. Sent pt MyChart message with directions to drop off urine sample. Lab outcome will dictate further response.

## 2021-10-25 NOTE — ED TRIAGE NOTES
Pt amb to triage c/o difficulty urinating since 10pm last noc. Pt satses since 10pm he has been 'only able to dribble' urine and cannot empty his bladder. Pt c/o discomfort to bladder.

## 2021-10-26 NOTE — ED NOTES
IV discontinued with cathlon intact    Discharge home with instructions, follow up care and rxn reviewed and given to patient with verbal understanding.      Ambulatory with a steady gait and stable condition       Portillo emptied 750 ml urine yellow

## 2021-10-26 NOTE — ED NOTES
Recovery during downtime.    MD updated patient with his results, plan of care and disposition     Leg bag applied

## 2021-10-26 NOTE — DISCHARGE INSTRUCTIONS
Follow-up with Dr. Bragg, Urologist, within the next 1 week.  Please call the office tomorrow and tell the office staff that the ER physician spoke with Dr. Bragg and that you have urinary retention and need to be seen in 1 week.    Return to the ER for any blood in urine, cloudy or foul-smelling urine, fevers over 100.4, shaking chills, nausea, vomiting, back pain, abdominal pain, or for any concerns.

## 2021-10-29 ENCOUNTER — HOSPITAL ENCOUNTER (EMERGENCY)
Facility: MEDICAL CENTER | Age: 81
End: 2021-10-29
Payer: MEDICARE

## 2021-11-29 ENCOUNTER — HOSPITAL ENCOUNTER (OUTPATIENT)
Dept: LAB | Facility: MEDICAL CENTER | Age: 81
End: 2021-11-29
Attending: PHYSICIAN ASSISTANT
Payer: MEDICARE

## 2021-11-29 PROCEDURE — 84154 ASSAY OF PSA FREE: CPT

## 2021-11-29 PROCEDURE — 36415 COLL VENOUS BLD VENIPUNCTURE: CPT

## 2021-11-29 PROCEDURE — 84153 ASSAY OF PSA TOTAL: CPT

## 2021-11-30 ENCOUNTER — OFFICE VISIT (OUTPATIENT)
Dept: CARDIOLOGY | Facility: MEDICAL CENTER | Age: 81
End: 2021-11-30
Payer: MEDICARE

## 2021-11-30 ENCOUNTER — TELEPHONE (OUTPATIENT)
Dept: SCHEDULING | Facility: IMAGING CENTER | Age: 81
End: 2021-11-30

## 2021-11-30 VITALS
RESPIRATION RATE: 14 BRPM | BODY MASS INDEX: 25.07 KG/M2 | HEART RATE: 55 BPM | OXYGEN SATURATION: 97 % | HEIGHT: 66 IN | DIASTOLIC BLOOD PRESSURE: 72 MMHG | SYSTOLIC BLOOD PRESSURE: 128 MMHG | WEIGHT: 156 LBS

## 2021-11-30 DIAGNOSIS — I25.10 CORONARY ARTERY DISEASE INVOLVING NATIVE CORONARY ARTERY OF NATIVE HEART WITHOUT ANGINA PECTORIS: ICD-10-CM

## 2021-11-30 DIAGNOSIS — I25.10 CORONARY ARTERY DISEASE DUE TO CALCIFIED CORONARY LESION: ICD-10-CM

## 2021-11-30 DIAGNOSIS — I10 ESSENTIAL HYPERTENSION: ICD-10-CM

## 2021-11-30 DIAGNOSIS — I25.84 CORONARY ARTERY DISEASE DUE TO CALCIFIED CORONARY LESION: ICD-10-CM

## 2021-11-30 DIAGNOSIS — Z78.9 STATIN INTOLERANCE: ICD-10-CM

## 2021-11-30 DIAGNOSIS — I35.1 NONRHEUMATIC AORTIC VALVE INSUFFICIENCY: ICD-10-CM

## 2021-11-30 PROCEDURE — 99214 OFFICE O/P EST MOD 30 MIN: CPT | Performed by: INTERNAL MEDICINE

## 2021-11-30 RX ORDER — ROSUVASTATIN CALCIUM 20 MG/1
20 TABLET, COATED ORAL EVERY EVENING
Qty: 100 TABLET | Refills: 3 | Status: ON HOLD
Start: 2021-11-30 | End: 2022-01-12

## 2021-11-30 RX ORDER — FINASTERIDE 5 MG/1
5 TABLET, FILM COATED ORAL DAILY
Status: ON HOLD | COMMUNITY
End: 2022-01-12

## 2021-11-30 RX ORDER — ALFUZOSIN HYDROCHLORIDE 10 MG/1
10 TABLET, EXTENDED RELEASE ORAL EVERY EVENING
Status: ON HOLD | COMMUNITY
End: 2022-01-12

## 2021-11-30 RX ORDER — ROSUVASTATIN CALCIUM 20 MG/1
20 TABLET, COATED ORAL EVERY EVENING
Qty: 30 TABLET | Refills: 11 | Status: SHIPPED | OUTPATIENT
Start: 2021-11-30 | End: 2021-11-30 | Stop reason: SDUPTHER

## 2021-11-30 ASSESSMENT — ENCOUNTER SYMPTOMS
DOUBLE VISION: 0
EYE PAIN: 0
HALLUCINATIONS: 0
SPEECH CHANGE: 0
EYE DISCHARGE: 0
PND: 0
DIZZINESS: 0
SHORTNESS OF BREATH: 0
CHILLS: 0
COUGH: 0
BLOOD IN STOOL: 0
MYALGIAS: 0
BLURRED VISION: 0
BRUISES/BLEEDS EASILY: 0
VOMITING: 0
PALPITATIONS: 0
DEPRESSION: 0
HEADACHES: 0
CLAUDICATION: 0
WEIGHT LOSS: 0
ABDOMINAL PAIN: 0
ORTHOPNEA: 0
NAUSEA: 0
FEVER: 0
SENSORY CHANGE: 0
FALLS: 0
LOSS OF CONSCIOUSNESS: 0

## 2021-11-30 ASSESSMENT — FIBROSIS 4 INDEX: FIB4 SCORE: 3.63

## 2021-11-30 NOTE — PROGRESS NOTES
Chief Complaint   Patient presents with   • Hypertension   • Coronary Artery Disease     F/V Dx: CAD s/p MVPCI       Subjective     Arcenio Barnett is an 81 y.o. male who presents today for cardiac care due to prior history of multivessel's coronary stents with LAD and RCA stents in 2007, subsequent in-stent restenosis of the LAD and stent placement in June 2016, hypertension, hyperlipidemia.    Of note, patient is also statin intolerance.    I have independently interpreted and reviewed blood tests results with patient in clinic which shows elevated LDL level 145, normal triglycerides 74, renal and liver function.    In the interim, patient has been doing well without having any symptoms. Patient denies having chest pain, dyspnea, palpitation, presyncope, syncope episodes.      Past Medical History:   Diagnosis Date   • Allergy    • CAD (coronary artery disease)     cardiac stents x 2 2000, 2007 [place] 2007 prox LAD   • Cholelithiasis    • History of bladder infections    • History of coronary artery stent placement 8/1/2014    Prox LAD, also RCA   • Hyperlipidemia    • Hypertension    • Statin intolerance 6/26/2018     History reviewed. No pertinent surgical history.  Family History   Problem Relation Age of Onset   • Cancer Father 57        lung cancer   • Cancer Brother 52        lung cancer   • Cancer Sister         breast   • Cancer Sister         breast   • Cancer Sister         pancreatic   • Hyperlipidemia Mother    • Stroke Mother      Social History     Socioeconomic History   • Marital status:      Spouse name: Not on file   • Number of children: Not on file   • Years of education: Not on file   • Highest education level: Not on file   Occupational History   • Not on file   Tobacco Use   • Smoking status: Never Smoker   • Smokeless tobacco: Never Used   • Tobacco comment: continued abstinence   Vaping Use   • Vaping Use: Never used   Substance and Sexual Activity   • Alcohol use:  No     Alcohol/week: 0.0 oz   • Drug use: Yes     Types: Marijuana, Inhaled     Comment: occasionally   • Sexual activity: Never     Partners: Female   Other Topics Concern   • Not on file   Social History Narrative    He is  to [place]. He relocated to [place] around 2015 from a small town in [place]. He is dissatisfied with living in [place] and [place]     Social Determinants of Health     Financial Resource Strain:    • Difficulty of Paying Living Expenses: Not on file   Food Insecurity:    • Worried About Running Out of Food in the Last Year: Not on file   • Ran Out of Food in the Last Year: Not on file   Transportation Needs:    • Lack of Transportation (Medical): Not on file   • Lack of Transportation (Non-Medical): Not on file   Physical Activity:    • Days of Exercise per Week: Not on file   • Minutes of Exercise per Session: Not on file   Stress:    • Feeling of Stress : Not on file   Social Connections:    • Frequency of Communication with Friends and Family: Not on file   • Frequency of Social Gatherings with Friends and Family: Not on file   • Attends Voodoo Services: Not on file   • Active Member of Clubs or Organizations: Not on file   • Attends Club or Organization Meetings: Not on file   • Marital Status: Not on file   Intimate Partner Violence:    • Fear of Current or Ex-Partner: Not on file   • Emotionally Abused: Not on file   • Physically Abused: Not on file   • Sexually Abused: Not on file   Housing Stability:    • Unable to Pay for Housing in the Last Year: Not on file   • Number of Places Lived in the Last Year: Not on file   • Unstable Housing in the Last Year: Not on file     Allergies   Allergen Reactions   • Statins [Hmg-Coa-R Inhibitors]      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      Outpatient Encounter Medications as of 11/30/2021   Medication Sig Dispense Refill   • alfuzosin (UROXATRAL) 10 MG SR tablet Take 10 mg by mouth every day.     •  finasteride (PROSCAR) 5 MG Tab Take 5 mg by mouth every day.     • carvedilol (COREG) 12.5 MG Tab TAKE 1 TABLET BY MOUTH TWICE DAILY WITH MEALS 180 Tablet 0   • ibuprofen (MOTRIN) 200 MG Tab Take 200 mg by mouth every 8 hours as needed for Mild Pain (knee pain).     • Multiple Vitamin (ONE-A-DAY 55 PLUS PO) Take  by mouth.     • aspirin (ASA) 81 MG Chew Tab chewable tablet Take 81 mg by mouth every day.     • gabapentin (NEURONTIN) 100 MG Cap Take 1 Capsule by mouth 2 times a day as needed. Bilateral knee pain, leg cramps, foot burning (Patient not taking: Reported on 11/30/2021) 60 Capsule 1   • terazosin (HYTRIN) 10 MG capsule Take 1 Cap by mouth every day. (Patient not taking: Reported on 11/30/2021) 100 Cap 3   • lisinopril (PRINIVIL) 5 MG Tab Take 1 Tab by mouth every day. (Patient not taking: Reported on 11/30/2021) 100 Tab 2     No facility-administered encounter medications on file as of 11/30/2021.     Review of Systems   Constitutional: Negative for chills, fever, malaise/fatigue and weight loss.   HENT: Negative for ear discharge, ear pain, hearing loss and nosebleeds.    Eyes: Negative for blurred vision, double vision, pain and discharge.   Respiratory: Negative for cough and shortness of breath.    Cardiovascular: Negative for chest pain, palpitations, orthopnea, claudication, leg swelling and PND.   Gastrointestinal: Negative for abdominal pain, blood in stool, melena, nausea and vomiting.   Genitourinary: Negative for dysuria and hematuria.   Musculoskeletal: Negative for falls, joint pain and myalgias.   Skin: Negative for itching and rash.   Neurological: Negative for dizziness, sensory change, speech change, loss of consciousness and headaches.   Endo/Heme/Allergies: Negative for environmental allergies. Does not bruise/bleed easily.   Psychiatric/Behavioral: Negative for depression, hallucinations and suicidal ideas.              Objective     /72 (BP Location: Left arm, Patient Position:  "Sitting, BP Cuff Size: Adult)   Pulse (!) 55   Resp 14   Ht 1.676 m (5' 6\")   Wt 70.8 kg (156 lb)   SpO2 97%   BMI 25.18 kg/m²     Physical Exam  Vitals and nursing note reviewed.   Constitutional:       General: He is not in acute distress.     Appearance: He is not diaphoretic.   HENT:      Head: Normocephalic and atraumatic.      Right Ear: External ear normal.      Left Ear: External ear normal.      Nose: No congestion or rhinorrhea.   Eyes:      General:         Right eye: No discharge.         Left eye: No discharge.   Neck:      Thyroid: No thyromegaly.      Vascular: No JVD.   Cardiovascular:      Rate and Rhythm: Normal rate and regular rhythm.      Pulses: Normal pulses.   Pulmonary:      Effort: No respiratory distress.   Abdominal:      General: There is no distension.      Tenderness: There is no abdominal tenderness.   Musculoskeletal:         General: No swelling or tenderness.      Right lower leg: No edema.      Left lower leg: No edema.   Skin:     General: Skin is warm and dry.   Neurological:      Mental Status: He is alert and oriented to person, place, and time.      Cranial Nerves: No cranial nerve deficit.   Psychiatric:         Behavior: Behavior normal.                Assessment & Plan     1. CAD s/p MVPCI     2. Essential hypertension     3. Coronary artery disease due to calcified coronary lesion     4. Nonrheumatic aortic valve insufficiency     5. Statin intolerance         Medical Decision Making: Today's Assessment/Status/Plan:   Coronary arterial disease s/p PCIs of LAD and RCA:  Betablocker as Carvedilol 12.5 mg po 2x daily.  ASA 81 mg po daily.  Statin as trial of Rosuvastatin 20 mg po daily.  No ACE-I due to patient's preference of not taking it.     Hypertension:  Optimize control with BB as permitted above in conjunction with CAD treatment.     Hyperlipidemia:  Optimize statin as within guidelines of CAD treatment as above.    Mild AI:  Continue to monitor " clinically.    Overall, based on prior history of obstructive coronary arterial disease, patient is at at high risk for recurrent events and will require consistent ongoing guidelines directed medical therapy to reduce his cardiovascular mortality and associated complications.    I will see patient back in clinic with lab tests and studies results in 3 months.    I thank you for referring patient to our Cardiology Clinic today.      Zaheer Winter MD.   [facility].  [phone]  [place], [place].

## 2021-11-30 NOTE — TELEPHONE ENCOUNTER
TT    Pt called to advise they would like to have their Rx of rosuvastatin (CRESTOR) 20 MG Tab to be sent to the [facility] on [facility].     Thank you,  Chelsea MUSTAFA

## 2021-12-01 LAB
PSA FREE MFR SERPL: 25 %
PSA FREE SERPL-MCNC: 3.8 NG/ML
PSA SERPL-MCNC: 15.5 NG/ML (ref 0–4)

## 2021-12-15 ENCOUNTER — TELEPHONE (OUTPATIENT)
Dept: CARDIOLOGY | Facility: MEDICAL CENTER | Age: 81
End: 2021-12-15

## 2021-12-15 NOTE — TELEPHONE ENCOUNTER
Clearance request received from [facility] [place] for pt's green light laser transurethral resection of prostate. Pt is not on anti-coagulation. Fax clearance response to [phone].     To TT, ok for pt to proceed?

## 2021-12-16 NOTE — TELEPHONE ENCOUNTER
Zaheer Winter M.D.  You 29 minutes ago (4:31 PM)     yes    Message text      Clearance response faxed, receipt confirmed.

## 2021-12-22 ENCOUNTER — HOSPITAL ENCOUNTER (OUTPATIENT)
Facility: MEDICAL CENTER | Age: 81
End: 2021-12-22
Attending: UROLOGY
Payer: MEDICARE

## 2021-12-22 PROCEDURE — 87186 SC STD MICRODIL/AGAR DIL: CPT

## 2021-12-22 PROCEDURE — 87077 CULTURE AEROBIC IDENTIFY: CPT

## 2021-12-22 PROCEDURE — 87086 URINE CULTURE/COLONY COUNT: CPT

## 2021-12-25 LAB
BACTERIA UR CULT: ABNORMAL
BACTERIA UR CULT: ABNORMAL
SIGNIFICANT IND 70042: ABNORMAL
SITE SITE: ABNORMAL
SOURCE SOURCE: ABNORMAL

## 2022-01-02 DIAGNOSIS — I10 ESSENTIAL HYPERTENSION: ICD-10-CM

## 2022-01-02 DIAGNOSIS — I25.10 CORONARY ARTERY DISEASE INVOLVING NATIVE CORONARY ARTERY OF NATIVE HEART WITHOUT ANGINA PECTORIS: ICD-10-CM

## 2022-01-04 ENCOUNTER — PRE-ADMISSION TESTING (OUTPATIENT)
Dept: ADMISSIONS | Facility: MEDICAL CENTER | Age: 82
End: 2022-01-04
Attending: UROLOGY
Payer: MEDICARE

## 2022-01-04 DIAGNOSIS — Z01.810 PRE-OPERATIVE CARDIOVASCULAR EXAMINATION: ICD-10-CM

## 2022-01-04 DIAGNOSIS — Z01.812 PRE-OPERATIVE LABORATORY EXAMINATION: ICD-10-CM

## 2022-01-04 LAB
ANION GAP SERPL CALC-SCNC: 10 MMOL/L (ref 7–16)
APPEARANCE UR: ABNORMAL
BACTERIA #/AREA URNS HPF: NEGATIVE /HPF
BASOPHILS # BLD AUTO: 1 % (ref 0–1.8)
BASOPHILS # BLD: 0.06 K/UL (ref 0–0.12)
BILIRUB UR QL STRIP.AUTO: NEGATIVE
BUN SERPL-MCNC: 19 MG/DL (ref 8–22)
CALCIUM SERPL-MCNC: 9 MG/DL (ref 8.5–10.5)
CHLORIDE SERPL-SCNC: 105 MMOL/L (ref 96–112)
CO2 SERPL-SCNC: 25 MMOL/L (ref 20–33)
COLOR UR: YELLOW
CREAT SERPL-MCNC: 1.09 MG/DL (ref 0.5–1.4)
EKG IMPRESSION: NORMAL
EOSINOPHIL # BLD AUTO: 0.31 K/UL (ref 0–0.51)
EOSINOPHIL NFR BLD: 5.1 % (ref 0–6.9)
EPI CELLS #/AREA URNS HPF: NEGATIVE /HPF
ERYTHROCYTE [DISTWIDTH] IN BLOOD BY AUTOMATED COUNT: 47.1 FL (ref 35.9–50)
GLUCOSE SERPL-MCNC: 94 MG/DL (ref 65–99)
GLUCOSE UR STRIP.AUTO-MCNC: NEGATIVE MG/DL
HCT VFR BLD AUTO: 40.9 % (ref 42–52)
HGB BLD-MCNC: 13.1 G/DL (ref 14–18)
HYALINE CASTS #/AREA URNS LPF: ABNORMAL /LPF
IMM GRANULOCYTES # BLD AUTO: 0.02 K/UL (ref 0–0.11)
IMM GRANULOCYTES NFR BLD AUTO: 0.3 % (ref 0–0.9)
INR PPP: 1.22 (ref 0.87–1.13)
KETONES UR STRIP.AUTO-MCNC: ABNORMAL MG/DL
LEUKOCYTE ESTERASE UR QL STRIP.AUTO: NEGATIVE
LYMPHOCYTES # BLD AUTO: 1.57 K/UL (ref 1–4.8)
LYMPHOCYTES NFR BLD: 25.7 % (ref 22–41)
MCH RBC QN AUTO: 30.8 PG (ref 27–33)
MCHC RBC AUTO-ENTMCNC: 32 G/DL (ref 33.7–35.3)
MCV RBC AUTO: 96 FL (ref 81.4–97.8)
MICRO URNS: ABNORMAL
MONOCYTES # BLD AUTO: 0.59 K/UL (ref 0–0.85)
MONOCYTES NFR BLD AUTO: 9.7 % (ref 0–13.4)
NEUTROPHILS # BLD AUTO: 3.56 K/UL (ref 1.82–7.42)
NEUTROPHILS NFR BLD: 58.2 % (ref 44–72)
NITRITE UR QL STRIP.AUTO: NEGATIVE
NRBC # BLD AUTO: 0 K/UL
NRBC BLD-RTO: 0 /100 WBC
PH UR STRIP.AUTO: 5.5 [PH] (ref 5–8)
PLATELET # BLD AUTO: 208 K/UL (ref 164–446)
PMV BLD AUTO: 11 FL (ref 9–12.9)
POTASSIUM SERPL-SCNC: 4.6 MMOL/L (ref 3.6–5.5)
PROT UR QL STRIP: 30 MG/DL
PROTHROMBIN TIME: 15 SEC (ref 12–14.6)
RBC # BLD AUTO: 4.26 M/UL (ref 4.7–6.1)
RBC # URNS HPF: ABNORMAL /HPF
RBC UR QL AUTO: ABNORMAL
SODIUM SERPL-SCNC: 140 MMOL/L (ref 135–145)
SP GR UR STRIP.AUTO: 1.02
URATE CRY #/AREA URNS HPF: POSITIVE /HPF
UROBILINOGEN UR STRIP.AUTO-MCNC: 0.2 MG/DL
WBC # BLD AUTO: 6.1 K/UL (ref 4.8–10.8)
WBC #/AREA URNS HPF: ABNORMAL /HPF

## 2022-01-04 PROCEDURE — 36415 COLL VENOUS BLD VENIPUNCTURE: CPT

## 2022-01-04 PROCEDURE — 85025 COMPLETE CBC W/AUTO DIFF WBC: CPT

## 2022-01-04 PROCEDURE — U0005 INFEC AGEN DETEC AMPLI PROBE: HCPCS

## 2022-01-04 PROCEDURE — 80048 BASIC METABOLIC PNL TOTAL CA: CPT

## 2022-01-04 PROCEDURE — 85610 PROTHROMBIN TIME: CPT

## 2022-01-04 PROCEDURE — U0003 INFECTIOUS AGENT DETECTION BY NUCLEIC ACID (DNA OR RNA); SEVERE ACUTE RESPIRATORY SYNDROME CORONAVIRUS 2 (SARS-COV-2) (CORONAVIRUS DISEASE [COVID-19]), AMPLIFIED PROBE TECHNIQUE, MAKING USE OF HIGH THROUGHPUT TECHNOLOGIES AS DESCRIBED BY CMS-2020-01-R: HCPCS

## 2022-01-04 PROCEDURE — 87086 URINE CULTURE/COLONY COUNT: CPT

## 2022-01-04 PROCEDURE — C9803 HOPD COVID-19 SPEC COLLECT: HCPCS

## 2022-01-04 PROCEDURE — 81001 URINALYSIS AUTO W/SCOPE: CPT

## 2022-01-04 RX ORDER — CEFDINIR 300 MG/1
300 CAPSULE ORAL 2 TIMES DAILY
COMMUNITY
End: 2022-02-28

## 2022-01-04 RX ORDER — CARVEDILOL 12.5 MG/1
TABLET ORAL
Qty: 200 TABLET | Refills: 3 | Status: SHIPPED | OUTPATIENT
Start: 2022-01-04 | End: 2023-02-06

## 2022-01-04 ASSESSMENT — FIBROSIS 4 INDEX: FIB4 SCORE: 3.67

## 2022-01-05 LAB
SARS-COV-2 RNA RESP QL NAA+PROBE: NOTDETECTED
SPECIMEN SOURCE: NORMAL

## 2022-01-06 LAB
BACTERIA UR CULT: NORMAL
SIGNIFICANT IND 70042: NORMAL
SITE SITE: NORMAL
SOURCE SOURCE: NORMAL

## 2022-01-12 ENCOUNTER — HOSPITAL ENCOUNTER (OUTPATIENT)
Facility: MEDICAL CENTER | Age: 82
End: 2022-01-12
Attending: UROLOGY | Admitting: UROLOGY
Payer: MEDICARE

## 2022-01-12 ENCOUNTER — ANESTHESIA (OUTPATIENT)
Dept: SURGERY | Facility: MEDICAL CENTER | Age: 82
End: 2022-01-12
Payer: MEDICARE

## 2022-01-12 ENCOUNTER — ANESTHESIA EVENT (OUTPATIENT)
Dept: SURGERY | Facility: MEDICAL CENTER | Age: 82
End: 2022-01-12
Payer: MEDICARE

## 2022-01-12 VITALS
BODY MASS INDEX: 24.06 KG/M2 | RESPIRATION RATE: 16 BRPM | HEART RATE: 55 BPM | WEIGHT: 149.69 LBS | HEIGHT: 66 IN | OXYGEN SATURATION: 96 % | SYSTOLIC BLOOD PRESSURE: 130 MMHG | TEMPERATURE: 97.2 F | DIASTOLIC BLOOD PRESSURE: 71 MMHG

## 2022-01-12 DIAGNOSIS — R33.8 BENIGN PROSTATIC HYPERPLASIA WITH URINARY RETENTION: ICD-10-CM

## 2022-01-12 DIAGNOSIS — N40.1 BENIGN PROSTATIC HYPERPLASIA WITH URINARY RETENTION: ICD-10-CM

## 2022-01-12 LAB
EXTERNAL QUALITY CONTROL: NORMAL
SARS-COV+SARS-COV-2 AG RESP QL IA.RAPID: NEGATIVE

## 2022-01-12 PROCEDURE — 160002 HCHG RECOVERY MINUTES (STAT): Performed by: UROLOGY

## 2022-01-12 PROCEDURE — A4357 BEDSIDE DRAINAGE BAG: HCPCS | Performed by: UROLOGY

## 2022-01-12 PROCEDURE — 160048 HCHG OR STATISTICAL LEVEL 1-5: Performed by: UROLOGY

## 2022-01-12 PROCEDURE — 700105 HCHG RX REV CODE 258: Performed by: UROLOGY

## 2022-01-12 PROCEDURE — 700101 HCHG RX REV CODE 250: Performed by: ANESTHESIOLOGY

## 2022-01-12 PROCEDURE — 700102 HCHG RX REV CODE 250 W/ 637 OVERRIDE(OP): Performed by: UROLOGY

## 2022-01-12 PROCEDURE — 160036 HCHG PACU - EA ADDL 30 MINS PHASE I: Performed by: UROLOGY

## 2022-01-12 PROCEDURE — 700111 HCHG RX REV CODE 636 W/ 250 OVERRIDE (IP): Performed by: UROLOGY

## 2022-01-12 PROCEDURE — A9270 NON-COVERED ITEM OR SERVICE: HCPCS | Performed by: UROLOGY

## 2022-01-12 PROCEDURE — 87426 SARSCOV CORONAVIRUS AG IA: CPT | Performed by: UROLOGY

## 2022-01-12 PROCEDURE — 501329 HCHG SET, CYSTO IRRIG Y TUR: Performed by: UROLOGY

## 2022-01-12 PROCEDURE — 160028 HCHG SURGERY MINUTES - 1ST 30 MINS LEVEL 3: Performed by: UROLOGY

## 2022-01-12 PROCEDURE — 700111 HCHG RX REV CODE 636 W/ 250 OVERRIDE (IP): Performed by: ANESTHESIOLOGY

## 2022-01-12 PROCEDURE — 700101 HCHG RX REV CODE 250: Performed by: UROLOGY

## 2022-01-12 PROCEDURE — 500879 HCHG PACK, CYSTO: Performed by: UROLOGY

## 2022-01-12 PROCEDURE — 160035 HCHG PACU - 1ST 60 MINS PHASE I: Performed by: UROLOGY

## 2022-01-12 PROCEDURE — 160039 HCHG SURGERY MINUTES - EA ADDL 1 MIN LEVEL 3: Performed by: UROLOGY

## 2022-01-12 PROCEDURE — A4346 CATH INDW FOLEY 3 WAY: HCPCS | Performed by: UROLOGY

## 2022-01-12 PROCEDURE — 160025 RECOVERY II MINUTES (STATS): Performed by: UROLOGY

## 2022-01-12 PROCEDURE — 160009 HCHG ANES TIME/MIN: Performed by: UROLOGY

## 2022-01-12 PROCEDURE — 160046 HCHG PACU - 1ST 60 MINS PHASE II: Performed by: UROLOGY

## 2022-01-12 RX ORDER — FUROSEMIDE 10 MG/ML
INJECTION INTRAMUSCULAR; INTRAVENOUS PRN
Status: DISCONTINUED | OUTPATIENT
Start: 2022-01-12 | End: 2022-01-12 | Stop reason: SURG

## 2022-01-12 RX ORDER — SODIUM CHLORIDE, SODIUM LACTATE, POTASSIUM CHLORIDE, CALCIUM CHLORIDE 600; 310; 30; 20 MG/100ML; MG/100ML; MG/100ML; MG/100ML
INJECTION, SOLUTION INTRAVENOUS CONTINUOUS
Status: ACTIVE | OUTPATIENT
Start: 2022-01-12 | End: 2022-01-12

## 2022-01-12 RX ORDER — LIDOCAINE HYDROCHLORIDE 20 MG/ML
INJECTION, SOLUTION EPIDURAL; INFILTRATION; INTRACAUDAL; PERINEURAL PRN
Status: DISCONTINUED | OUTPATIENT
Start: 2022-01-12 | End: 2022-01-12 | Stop reason: SURG

## 2022-01-12 RX ORDER — ONDANSETRON 2 MG/ML
4 INJECTION INTRAMUSCULAR; INTRAVENOUS
Status: DISCONTINUED | OUTPATIENT
Start: 2022-01-12 | End: 2022-01-12 | Stop reason: HOSPADM

## 2022-01-12 RX ORDER — PHENAZOPYRIDINE HYDROCHLORIDE 200 MG/1
200 TABLET, FILM COATED ORAL 3 TIMES DAILY PRN
Qty: 6 TABLET | Refills: 0 | Status: SHIPPED | OUTPATIENT
Start: 2022-01-12 | End: 2022-04-04

## 2022-01-12 RX ORDER — SODIUM CHLORIDE, SODIUM LACTATE, POTASSIUM CHLORIDE, CALCIUM CHLORIDE 600; 310; 30; 20 MG/100ML; MG/100ML; MG/100ML; MG/100ML
INJECTION, SOLUTION INTRAVENOUS CONTINUOUS
Status: DISCONTINUED | OUTPATIENT
Start: 2022-01-12 | End: 2022-01-12 | Stop reason: HOSPADM

## 2022-01-12 RX ORDER — ATROPA BELLADONNA AND OPIUM 16.2; 6 MG/1; MG/1
SUPPOSITORY RECTAL
Status: DISCONTINUED | OUTPATIENT
Start: 2022-01-12 | End: 2022-01-12 | Stop reason: HOSPADM

## 2022-01-12 RX ORDER — DIPHENHYDRAMINE HYDROCHLORIDE 50 MG/ML
12.5 INJECTION INTRAMUSCULAR; INTRAVENOUS
Status: DISCONTINUED | OUTPATIENT
Start: 2022-01-12 | End: 2022-01-12 | Stop reason: HOSPADM

## 2022-01-12 RX ORDER — GENTAMICIN SULFATE 40 MG/ML
INJECTION, SOLUTION INTRAMUSCULAR; INTRAVENOUS
Status: DISCONTINUED | OUTPATIENT
Start: 2022-01-12 | End: 2022-01-12 | Stop reason: HOSPADM

## 2022-01-12 RX ORDER — HALOPERIDOL 5 MG/ML
1 INJECTION INTRAMUSCULAR
Status: DISCONTINUED | OUTPATIENT
Start: 2022-01-12 | End: 2022-01-12 | Stop reason: HOSPADM

## 2022-01-12 RX ORDER — OXYCODONE HCL 5 MG/5 ML
5 SOLUTION, ORAL ORAL
Status: COMPLETED | OUTPATIENT
Start: 2022-01-12 | End: 2022-01-12

## 2022-01-12 RX ORDER — CEFAZOLIN SODIUM 1 G/3ML
INJECTION, POWDER, FOR SOLUTION INTRAMUSCULAR; INTRAVENOUS PRN
Status: DISCONTINUED | OUTPATIENT
Start: 2022-01-12 | End: 2022-01-12 | Stop reason: SURG

## 2022-01-12 RX ORDER — MIDAZOLAM HYDROCHLORIDE 1 MG/ML
INJECTION INTRAMUSCULAR; INTRAVENOUS PRN
Status: DISCONTINUED | OUTPATIENT
Start: 2022-01-12 | End: 2022-01-12 | Stop reason: SURG

## 2022-01-12 RX ORDER — OXYCODONE HCL 5 MG/5 ML
10 SOLUTION, ORAL ORAL
Status: COMPLETED | OUTPATIENT
Start: 2022-01-12 | End: 2022-01-12

## 2022-01-12 RX ADMIN — FENTANYL CITRATE 50 MCG: 50 INJECTION, SOLUTION INTRAMUSCULAR; INTRAVENOUS at 09:44

## 2022-01-12 RX ADMIN — PROPOFOL 130 MG: 10 INJECTION, EMULSION INTRAVENOUS at 09:44

## 2022-01-12 RX ADMIN — LIDOCAINE HYDROCHLORIDE 100 MG: 20 INJECTION, SOLUTION EPIDURAL; INFILTRATION; INTRACAUDAL at 09:44

## 2022-01-12 RX ADMIN — SODIUM CHLORIDE, POTASSIUM CHLORIDE, SODIUM LACTATE AND CALCIUM CHLORIDE: 600; 310; 30; 20 INJECTION, SOLUTION INTRAVENOUS at 09:20

## 2022-01-12 RX ADMIN — FENTANYL CITRATE 50 MCG: 50 INJECTION INTRAMUSCULAR; INTRAVENOUS at 11:35

## 2022-01-12 RX ADMIN — LIDOCAINE HYDROCHLORIDE 0.5 ML: 10 INJECTION, SOLUTION EPIDURAL; INFILTRATION; INTRACAUDAL; PERINEURAL at 09:20

## 2022-01-12 RX ADMIN — CEFAZOLIN 2 G: 330 INJECTION, POWDER, FOR SOLUTION INTRAMUSCULAR; INTRAVENOUS at 09:33

## 2022-01-12 RX ADMIN — SODIUM CHLORIDE, POTASSIUM CHLORIDE, SODIUM LACTATE AND CALCIUM CHLORIDE: 600; 310; 30; 20 INJECTION, SOLUTION INTRAVENOUS at 11:09

## 2022-01-12 RX ADMIN — FUROSEMIDE 10 MG: 10 INJECTION, SOLUTION INTRAMUSCULAR; INTRAVENOUS at 11:10

## 2022-01-12 RX ADMIN — MIDAZOLAM HYDROCHLORIDE 2 MG: 1 INJECTION, SOLUTION INTRAMUSCULAR; INTRAVENOUS at 09:44

## 2022-01-12 RX ADMIN — FENTANYL CITRATE 50 MCG: 50 INJECTION, SOLUTION INTRAMUSCULAR; INTRAVENOUS at 10:33

## 2022-01-12 RX ADMIN — GLYCOPYRROLATE 0.4 MG: 0.2 INJECTION INTRAMUSCULAR; INTRAVENOUS at 09:39

## 2022-01-12 ASSESSMENT — PAIN SCALES - GENERAL: PAIN_LEVEL: 0

## 2022-01-12 ASSESSMENT — FIBROSIS 4 INDEX: FIB4 SCORE: 2.26

## 2022-01-12 NOTE — ANESTHESIA TIME REPORT
Anesthesia Start and Stop Event Times     Date Time Event    1/12/2022 0915 Ready for Procedure     0933 Anesthesia Start        Responsible Staff  01/12/22    Name Role Begin End    Israel Smith M.D. Anesth 0933         Preop Diagnosis (Free Text):  Pre-op Diagnosis     LOWER URINARY TRACT SYMPTOMS DUE TO BENIGN PROSTATIC HYPERTROPHY        Preop Diagnosis (Codes):    Premium Reason  Non-Premium    Comments:

## 2022-01-12 NOTE — ANESTHESIA PREPROCEDURE EVALUATION
Case: [id number] Date/Time: 01/12/22 1015    Procedure: ELECTROVAPORIZATION, PROSTATE, TRANSURETHRAL    Pre-op diagnosis: LOWER URINARY TRACT SYMPTOMS DUE TO BENIGN PROSTATIC HYPERTROPHY    Location: [facility] OR 18 / SURGERY [facility]    Surgeons: Jamaal Liz M.D.          Relevant Problems   CARDIAC   (positive) CAD s/p MVPCI   (positive) Essential hypertension   (positive) Nonrheumatic aortic valve insufficiency         (positive) CKD (chronic kidney disease) stage 3, GFR 30-59 ml/min (HCC)       Physical Exam    Anesthesia Plan    ASA 3   ASA physical status 3 criteria: CAD/stents (> 3 months)    Plan - general       Airway plan will be LMA          Induction: intravenous    Postoperative Plan: Postoperative administration of opioids is intended.    Pertinent diagnostic labs and testing reviewed    Informed Consent:    Anesthetic plan and risks discussed with patient.    Use of blood products discussed with: patient whom consented to blood products.

## 2022-01-12 NOTE — OR NURSING
1130: Patient awake and alert and tolerating sips of water without issue. VSS. 3-way vega catheter in place. CBI running. Pt denies pain and nausea at this time.     1220: Pt wife called and updated on plan of care.     1315: CBI discontinued and pt urine a clear light pink. Pt has been updated on plan of care and all questions have been addressed. Pt given stat lock and leg bag for discharge.    Report called to Gene BELTRÁN. Pt transported to phase 2 in stable condition.

## 2022-01-12 NOTE — OR NURSING
1400 - wife at bedside. Discharge instructions reviewed with pt and pt's wife. Leg bag provided to pt.    1410 - pt verbalizes readiness for discharge. Pt taken to car via wheelchair by CNA. No further needs.

## 2022-01-12 NOTE — OR SURGEON
Immediate Post OP Note    PreOp Diagnosis: bph with retention      PostOp Diagnosis: same      Procedure(s):  ELECTROVAPORIZATION, PROSTATE, TRANSURETHRAL - Wound Class: Clean Contaminated    Surgeon(s):  Jamaal Liz M.D.    Anesthesiologist/Type of Anesthesia:  Anesthesiologist: Israel Smith M.D./General    Surgical Staff:  Circulator: Nayeli Castelan R.N.; Mahnaz Silva R.N.  Laser Staff: Maris Bello Circulator: Ksenia Leon RRADHA; Billie Carmichael R.N.  Relief Scrub: Pramod Becker  Scrub Person: Alvarado Maurer    Specimens removed if any:  * No specimens in log *    Estimated Blood Loss: min    Findings: trilobar hypertrophy. ~250k joules, 52g prostate    Complications: none        1/12/2022 11:20 AM Jamaal Liz M.D.

## 2022-01-12 NOTE — DISCHARGE INSTRUCTIONS
ACTIVITY: Rest and take it easy for the first 24 hours.  A responsible adult is recommended to remain with you during that time.  It is normal to feel sleepy.  We encourage you to not do anything that requires balance, judgment or coordination.    MILD FLU-LIKE SYMPTOMS ARE NORMAL. YOU MAY EXPERIENCE GENERALIZED MUSCLE ACHES, THROAT IRRITATION, HEADACHE AND/OR SOME NAUSEA.    FOR 24 HOURS DO NOT:  Drive, operate machinery or run household appliances.  Drink beer or alcoholic beverages.   Make important decisions or sign legal documents.    SPECIAL INSTRUCTIONS:   Transurethral Vaporization of Prostate, Care After  This sheet gives you information about how to care for yourself after your procedure. Your health care provider may also give you more specific instructions. If you have problems or questions, contact your health care provider.  What can I expect after the procedure?  After the procedure, it is common to have:  · Pain or discomfort around your penis.  · Blood in your urine.  · Burning during urination.  · Sudden urges to urinate.  · Little or no semen produced during ejaculation (retrograde ejaculation).  Follow these instructions at home:  Medicines  · Take over-the-counter and prescription medicines only as told by your health care provider.  · If you were prescribed an antibiotic medicine, take it as told by your health care provider. Do not stop taking the antibiotic even if you start to feel better.  Activity  · Do not drive for 24 hours if you were given a medicine to help you relax (sedative) during your procedure.  · Do not drive or use heavy machinery while taking prescription pain medicine.  · Do not resume sexual activity until your health care provider approves. Ask your health care provider:  ? What activities are safe for you.  ? When you may return to your normal activities.  · Do not lift anything that is heavier than 10 lb (4.5 kg), or the limit that you are told, until your health care  provider says that it is safe.  General instructions  · Drink enough fluid to keep your urine clear or pale yellow.  · To prevent or treat constipation while you are taking prescription pain medicine, your health care provider may recommend that you:  ? Take over-the-counter or prescription medicines.  ? Eat foods that are high in fiber, such as fresh fruits and vegetables, whole grains, and beans.  ? Limit foods that are high in fat and processed sugars, such as fried and sweet foods.  · If you go home with a tube draining your urine (urinary catheter), care for your catheter as told by your health care provider. This may include:  ? Washing your hands before and after you touch the catheter.  ? Keeping the area around the catheter clean and dry.  ? Emptying the catheter bag when it is full and monitoring the amount and color of your urine.  ? Avoiding bending or breaking the catheter.  ? Keeping air out of the catheter.  ? Not putting the catheter underwater.  ? Visiting your health care provider to have the catheter removed.  · Keep all follow-up visits as told by your health care provider. This is important.  Contact a health care provider if:  · You have:  ? A fever or chills.  ? Trouble urinating or controlling when you urinate.  ? More blood in your urine instead of less.  ? Problems getting an erection.  · You start to pass blood clots in your urine.  · You have nausea or you vomit.  · There is more redness, swelling, or pain in your penis.  Get help right away if:  · You have bright red urine.  · You cannot urinate.  · You have severe pain that does not get better with medicine.  · You have shortness of breath.  Summary  · After this procedure, it is common to have some blood in your urine. If you see bright red blood in your urine, however, you should get medical help right away.  · This procedure may cause you to produce little or no semen when ejaculating (retrograde ejaculation).  · Follow restrictions  about lifting and sexual activity as told by your health care provider. Ask what activities are safe for you.  · Drink enough fluid to keep your urine clear or pale yellow.  This information is not intended to replace advice given to you by your health care provider. Make sure you discuss any questions you have with your health care provider.  Document Released: 2018 Document Revised: 2018 Document Reviewed: 2018  [employer] Patient Education ©  [employer]      Indwelling Urinary Catheter Care, Adult  An indwelling urinary catheter is a thin tube that is put into your bladder. The tube helps to drain pee (urine) out of your body. The tube goes in through your urethra. Your urethra is where pee comes out of your body. Your pee will come out through the catheter, then it will go into a bag (drainage bag).  Take good care of your catheter so it will work well.  How to wear your catheter and bag  Supplies needed  · Sticky tape (adhesive tape) or a leg strap.  · Alcohol wipe or soap and water (if you use tape).  · A clean towel (if you use tape).  · Large overnight bag.  · Smaller bag (leg bag).  Wearing your catheter  Attach your catheter to your leg with tape or a leg strap.  · Make sure the catheter is not pulled tight.  · If a leg strap gets wet, take it off and put on a dry strap.  · If you use tape to hold the bag on your le. Use an alcohol wipe or soap and water to wash your skin where the tape made it sticky before.  2. Use a clean towel to pat-dry that skin.  3. Use new tape to make the bag stay on your leg.  Wearing your bags  You should have been given a large overnight bag.  · You may wear the overnight bag in the day or night.  · Always have the overnight bag lower than your bladder.  Do not let the bag touch the floor.  · Before you go to sleep, put a clean plastic bag in a wastebasket. Then hang the overnight bag inside the wastebasket.  You should also have a smaller leg bag  that fits under your clothes.  · Always wear the leg bag below your knee.  · Do not wear your leg bag at night.  How to care for your skin and catheter  Supplies needed  · A clean washcloth.  · Water and mild soap.  · A clean towel.  Caring for your skin and catheter  · Clean the skin around your catheter every day:  ? Wash your hands with soap and water.  ? Wet a clean washcloth in warm water and mild soap.  ? Clean the skin around your urethra.  ? If you are female:  ? Gently spread the folds of skin around your vagina (labia).  ? With the washcloth in your other hand, wipe the inner side of your labia on each side. Wipe from front to back.  ? If you are male:  ? Pull back any skin that covers the end of your penis (foreskin).  ? With the washcloth in your other hand, wipe your penis in small circles. Start wiping at the tip of your penis, then move away from the catheter.  ? Move the foreskin back in place, if needed.  ? With your free hand, hold the catheter close to where it goes into your body.  ? Keep holding the catheter during cleaning so it does not get pulled out.  ? With the washcloth in your other hand, clean the catheter.  ? Only wipe downward on the catheter.  ? Do not wipe upward toward your body. Doing this may push germs into your urethra and cause infection.  ? Use a clean towel to pat-dry the catheter and the skin around it. Make sure to wipe off all soap.  ? Wash your hands with soap and water.  · Shower every day. Do not take baths.  · Do not use cream, ointment, or lotion on the area where the catheter goes into your body, unless your doctor tells you to.  · Do not use powders, sprays, or lotions on your genital area.  · Check your skin around the catheter every day for signs of infection. Check for:  ? Redness, swelling, or pain.  ? Fluid or blood.  ? Warmth.  ? Pus or a bad smell.  How to empty the bag  Supplies needed  · Rubbing alcohol.  · Gauze pad or cotton ball.  · Tape or a leg  strap.  Emptying the bag  Pour the pee out of your bag when it is ?-½ full, or at least 2-3 times a day. Do this for your overnight bag and your leg bag.  1. Wash your hands with soap and water.  2. Separate (detach) the bag from your leg.  3. Hold the bag over the toilet or a clean pail. Keep the bag lower than your hips and bladder. This is so the pee (urine) does not go back into the tube.  4. Open the pour spout. It is at the bottom of the bag.  5. Empty the pee into the toilet or pail. Do not let the pour spout touch any surface.  6. Put rubbing alcohol on a gauze pad or cotton ball.  7. Use the gauze pad or cotton ball to clean the pour spout.  8. Close the pour spout.  9. Attach the bag to your leg with tape or a leg strap.  10. Wash your hands with soap and water.  Follow instructions for cleaning the drainage bag:  · From the product maker.  · As told by your doctor.  How to change the bag  Supplies needed  · Alcohol wipes.  · A clean bag.  · Tape or a leg strap.  Changing the bag  Replace your bag when it starts to leak, smell bad, or look dirty.  1. Wash your hands with soap and water.  2. Separate the dirty bag from your leg.  3. Pinch the catheter with your fingers so that pee does not spill out.  4. Separate the catheter tube from the bag tube where these tubes connect (at the connection valve). Do not let the tubes touch any surface.  5. Clean the end of the catheter tube with an alcohol wipe. Use a different alcohol wipe to clean the end of the bag tube.  6. Connect the catheter tube to the tube of the clean bag.  7. Attach the clean bag to your leg with tape or a leg strap. Do not make the bag tight on your leg.  8. Wash your hands with soap and water.  General rules  · Never pull on your catheter. Never try to take it out. Doing that can hurt you.  · Always wash your hands before and after you touch your catheter or bag. Use a mild, fragrance-free soap. If you do not have soap and water, use hand  .  · Always make sure there are no twists or bends (kinks) in the catheter tube.  · Always make sure there are no leaks in the catheter or bag.  · Drink enough fluid to keep your pee pale yellow.  · Do not take baths, swim, or use a hot tub.  · If you are female, wipe from front to back after you poop (have a bowel movement).  Contact a doctor if:  · Your pee is cloudy.  · Your pee smells worse than usual.  · Your catheter gets clogged.  · Your catheter leaks.  · Your bladder feels full.  Get help right away if:  · You have redness, swelling, or pain where the catheter goes into your body.  · You have fluid, blood, pus, or a bad smell coming from the area where the catheter goes into your body.  · Your skin feels warm where the catheter goes into your body.  · You have a fever.  · You have pain in your:  ? Belly (abdomen).  ? Legs.  ? Lower back.  ? Bladder.  · You see blood in the catheter.  · Your pee is pink or red.  · You feel sick to your stomach (nauseous).  · You throw up (vomit).  · You have chills.  · Your pee is not draining into the bag.  · Your catheter gets pulled out.  Summary  · An indwelling urinary catheter is a thin tube that is placed into the bladder to help drain pee (urine) out of the body.  · The catheter is placed into the part of the body that drains pee from the bladder (urethra).  · Taking good care of your catheter will keep it working properly and help prevent problems.  · Always wash your hands before and after touching your catheter or bag.  · Never pull on your catheter or try to take it out.  This information is not intended to replace advice given to you by your health care provider. Make sure you discuss any questions you have with your health care provider.  Document Released: 04/14/2014 Document Revised: 04/10/2020 Document Reviewed: 08/03/2018  Else[employer] Patient Education © 2020 [employer]      DIET: To avoid nausea, slowly advance diet as tolerated, avoiding spicy or  greasy foods for the first day.  Add more substantial food to your diet according to your physician's instructions.  Babies can be fed formula or breast milk as soon as they are hungry.  INCREASE FLUIDS AND FIBER TO AVOID CONSTIPATION.    SURGICAL DRESSING/BATHING: *okay to shower*    FOLLOW-UP APPOINTMENT:  A follow-up appointment should be arranged with your doctor in *tomorrow, 1/13/22*; call to schedule.    You should CALL YOUR PHYSICIAN if you develop:  Fever greater than 101 degrees F.  Pain not relieved by medication, or persistent nausea or vomiting.  Excessive bleeding (blood soaking through dressing) or unexpected drainage from the wound.  Extreme redness or swelling around the incision site, drainage of pus or foul smelling drainage.  Inability to urinate or empty your bladder within 8 hours.  Problems with breathing or chest pain.    You should call 911 if you develop problems with breathing or chest pain.  If you are unable to contact your doctor or surgical center, you should go to the nearest emergency room or urgent care center.  Physician's telephone #: *Dr. Liz [phone]*    If any questions arise, call your doctor.  If your doctor is not available, please feel free to call the Surgical Center at [phone].     A registered nurse may call you a few days after your surgery to see how you are doing after your procedure.    MEDICATIONS: Resume taking daily medication.  Take prescribed pain medication with food.  If no medication is prescribed, you may take non-aspirin pain medication if needed.  PAIN MEDICATION CAN BE VERY CONSTIPATING.  Take a stool softener or laxative such as senokot, pericolace, or milk of magnesia if needed.    Prescription given for *Pyridium*.     If your physician has prescribed pain medication that includes Acetaminophen (Tylenol), do not take additional Acetaminophen (Tylenol) while taking the prescribed medication.    Depression / Suicide Risk    As you are  discharged from this [facility] facility, it is important to learn how to keep safe from harming yourself.    Recognize the warning signs:  · Abrupt changes in personality, positive or negative- including increase in energy   · Giving away possessions  · Change in eating patterns- significant weight changes-  positive or negative  · Change in sleeping patterns- unable to sleep or sleeping all the time   · Unwillingness or inability to communicate  · Depression  · Unusual sadness, discouragement and loneliness  · Talk of wanting to die  · Neglect of personal appearance   · Rebelliousness- reckless behavior  · Withdrawal from people/activities they love  · Confusion- inability to concentrate     If you or a loved one observes any of these behaviors or has concerns about self-harm, here's what you can do:  · Talk about it- your feelings and reasons for harming yourself  · Remove any means that you might use to hurt yourself (examples: pills, rope, extension cords, firearm)  · Get professional help from the community (Mental Health, Substance Abuse, psychological counseling)  · Do not be alone:Call your Safe Contact- someone whom you trust who will be there for you.  · Call your local CRISIS HOTLINE 988-8417 or 922-581-4814  · Call your local Children's Mobile Crisis Response Team [place] (203) 939-6798 or www.[employer]  · Call the toll free National Suicide Prevention Hotlines   · National Suicide Prevention Lifeline 470-914-OHFK (5254)  · National Hope Line Network 800-SUICIDE (375-7466)

## 2022-01-12 NOTE — OP REPORT
DATE OF SERVICE:  01/12/2022     NAME OF OPERATION:  GreenLight photoselective vaporization of prostate.     PREOPERATIVE DIAGNOSIS:  Benign prostatic hypertrophy with urinary retention.     POSTOPERATIVE DIAGNOSIS:  Benign prostatic hypertrophy with urinary retention.     PRIMARY SURGEON:  Jamaal Liz MD     ANESTHESIOLOGIST:  Israel Smith MD     FINDINGS:  Trilobar hypertrophy of the prostate, approximately 50 gram gland,   approximately 250,000 joules.     INDICATIONS:  Briefly, the patient is an 82-year-old gentleman with a history   of urinary retention.  Workup demonstrated the presence of an obstructing   prostate.  Upon consideration of options, he elected to undergo surgical   management with GreenLight PVP.  Informed consent was obtained.     OPERATION IN DETAIL:  The patient was taken to the operating room and placed   on the operating table in supine position.  After administration of general   anesthetic, he was placed in lithotomy.  Genitals were prepped and draped   sterilely.  A 21-English laser scope was passed in the bladder with visualizing   obturator.  Urethra was within normal limits.  Prostatic urethra was   completely obstructed with trilobar hypertrophy of the prostate.  Bladder   showed moderate trabeculations.  Both ureteral orifices were noted in the   normal anatomic position.     A GreenLight XPS fiber was then utilized at 80 watt settings to create a   trough at the 7 o'clock position beginning at the bladder neck and this was   taken back to the verumontanum.  The same was performed at the 5 o'clock   position.  The median lobe was then completely vaporized down to its floor in   between.     The patient's bladder neck was then opened at the lower energy settings on   both sides.     Higher energy settings were then utilized to begin vaporization of the   patient's left lateral lobe.  This was carried back towards the level of the   capsular fibers.  The same was performed  on the contralateral side.     During this time that a small arterial bleeder was encountered that despite   approximately 5-8 minutes of attempting coagulation, I was unsuccessful.  A   transition was made to a resectoscope to allow for cautery of this vessel and   some very minimal resection of tissue.     The laser scope was then replaced and additional lasering of tissue and   coagulation was carried out to create a wide open fossa through the prostate.    Care was taken not to vaporize any tissue distal to the verumontanum, which   remained intact.  At the conclusion, there was a wide open bladder neck and   prostatic fossa.  A 22-Uzbek 3-way catheter was placed to continuous   irrigation at the conclusion.  It was draining clear irrigant on moderate rate   CBI.     It is our intention that the patient will be maintained in PACU and have his   CBI weaned over the next hour.  It is our intention to go home with a void   trial tomorrow morning in the office.        ______________________________  Jamaal Liz MD    [facility] [place]    DD:  01/12/2022 11:28  DT:  01/12/2022 11:44    Job#:  [id number]

## 2022-01-12 NOTE — PROGRESS NOTES
Patient in pre-op, assessment completed, patient and wife Linh updated on plan of care, all questions answered, no further needs at this time, call light within reach.

## 2022-01-12 NOTE — ANESTHESIA PROCEDURE NOTES
Airway    Date/Time: 1/12/2022 9:44 AM  Performed by: Israel Smith M.D.  Authorized by: Israel Smith M.D.     Location:  OR  Urgency:  Elective  Indications for Airway Management:  Anesthesia      Spontaneous Ventilation: absent    Sedation Level:  Deep  Preoxygenated: Yes    Final Airway Type:  Supraglottic airway  Final Supraglottic Airway:  Standard LMA    SGA Size:  4  Number of Attempts at Approach:  1

## 2022-01-12 NOTE — OR NURSING
@1327 PT arrived to Phase 2. Pt has no complaints of pain or nausea. PT mobilized from gurney to chair. Portillo drainage bag with light pink clear urine. Vital signs stable.     @1321 PTs wife updated on patients status in discharge area.

## 2022-02-22 ENCOUNTER — HOSPITAL ENCOUNTER (EMERGENCY)
Facility: MEDICAL CENTER | Age: 82
End: 2022-02-22
Attending: EMERGENCY MEDICINE
Payer: MEDICARE

## 2022-02-22 ENCOUNTER — APPOINTMENT (OUTPATIENT)
Dept: RADIOLOGY | Facility: MEDICAL CENTER | Age: 82
End: 2022-02-22
Attending: EMERGENCY MEDICINE
Payer: MEDICARE

## 2022-02-22 VITALS
SYSTOLIC BLOOD PRESSURE: 148 MMHG | BODY MASS INDEX: 24.48 KG/M2 | DIASTOLIC BLOOD PRESSURE: 81 MMHG | RESPIRATION RATE: 18 BRPM | WEIGHT: 152.34 LBS | TEMPERATURE: 98 F | HEIGHT: 66 IN | HEART RATE: 60 BPM | OXYGEN SATURATION: 98 %
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
## 2022-02-22 DIAGNOSIS — S09.90XA CLOSED HEAD INJURY, INITIAL ENCOUNTER: ICD-10-CM

## 2022-02-22 DIAGNOSIS — S42.272A CLOSED TORUS FRACTURE OF PROXIMAL END OF LEFT HUMERUS, INITIAL ENCOUNTER: ICD-10-CM

## 2022-02-22 PROCEDURE — 29105 APPLICATION LONG ARM SPLINT: CPT

## 2022-02-22 PROCEDURE — 90471 IMMUNIZATION ADMIN: CPT

## 2022-02-22 PROCEDURE — 29125 APPL SHORT ARM SPLINT STATIC: CPT

## 2022-02-22 PROCEDURE — 302874 HCHG BANDAGE ACE 2 OR 3""

## 2022-02-22 PROCEDURE — 73060 X-RAY EXAM OF HUMERUS: CPT | Mod: LT

## 2022-02-22 PROCEDURE — 96372 THER/PROPH/DIAG INJ SC/IM: CPT

## 2022-02-22 PROCEDURE — 73030 X-RAY EXAM OF SHOULDER: CPT | Mod: LT

## 2022-02-22 PROCEDURE — 700111 HCHG RX REV CODE 636 W/ 250 OVERRIDE (IP): Performed by: EMERGENCY MEDICINE

## 2022-02-22 PROCEDURE — 99284 EMERGENCY DEPT VISIT MOD MDM: CPT

## 2022-02-22 PROCEDURE — 70450 CT HEAD/BRAIN W/O DYE: CPT | Mod: ME

## 2022-02-22 PROCEDURE — 90715 TDAP VACCINE 7 YRS/> IM: CPT | Performed by: EMERGENCY MEDICINE

## 2022-02-22 RX ORDER — MORPHINE SULFATE 4 MG/ML
4 INJECTION INTRAVENOUS ONCE
Status: COMPLETED | OUTPATIENT
Start: 2022-02-22 | End: 2022-02-22

## 2022-02-22 RX ORDER — MORPHINE SULFATE 15 MG/1
7.5 TABLET ORAL EVERY 6 HOURS PRN
Qty: 10 TABLET | Refills: 0 | Status: SHIPPED | OUTPATIENT
Start: 2022-02-22 | End: 2022-02-27

## 2022-02-22 RX ADMIN — CLOSTRIDIUM TETANI TOXOID ANTIGEN (FORMALDEHYDE INACTIVATED), CORYNEBACTERIUM DIPHTHERIAE TOXOID ANTIGEN (FORMALDEHYDE INACTIVATED), BORDETELLA PERTUSSIS TOXOID ANTIGEN (GLUTARALDEHYDE INACTIVATED), BORDETELLA PERTUSSIS FILAMENTOUS HEMAGGLUTININ ANTIGEN (FORMALDEHYDE INACTIVATED), BORDETELLA PERTUSSIS PERTACTIN ANTIGEN, AND BORDETELLA PERTUSSIS FIMBRIAE 2/3 ANTIGEN 0.5 ML: 5; 2; 2.5; 5; 3; 5 INJECTION, SUSPENSION INTRAMUSCULAR at 17:58

## 2022-02-22 RX ADMIN — MORPHINE SULFATE 4 MG: 4 INJECTION INTRAVENOUS at 19:30

## 2022-02-22 ASSESSMENT — FIBROSIS 4 INDEX: FIB4 SCORE: 2.26

## 2022-02-23 ENCOUNTER — TELEPHONE (OUTPATIENT)
Dept: MEDICAL GROUP | Facility: PHYSICIAN GROUP | Age: 82
End: 2022-02-23
Payer: MEDICARE

## 2022-02-23 NOTE — TELEPHONE ENCOUNTER
Called pt in response to ED visit yesterday. Spoke w/wife (Linh). Ortho appt is scheduled for tomorrow.   Pain is under control for now, they haven't picked up Morphine Rx- advised to  in case it is needed they will have it on hand.   Scheduled ED f/u for 2/28/22. Gave my direct extension to Linh if they need anything or have questions/concerns. Linh thankful for call.

## 2022-02-23 NOTE — ED PROVIDER NOTES
"ED Provider Note    CHIEF COMPLAINT  Chief Complaint   Patient presents with   • GLF     Spouse reports \"he slipped in a parking lot, I think on some ice\". Reports taking ASA daily, denies other thinners. Denies LOC. Reports L shoulder and L arm pain as well as abrasion to L face.Denies c-spine tenderness to palpation.      HPI  Patient is a 82-year-old male with a history of chronic arthritis and lower extremity pains who presents emergency room following a ground-level fall earlier today. He is with his spouse when he was walking a parking lot and lost his footing on some ice. He fell to the left side, landing on his left shoulder and left side of his head. Denies loss consciousness, had some immediate left shoulder pain and upper arm pain. He has a small headache that has since subsided. He has difficulty walking at baseline and says that he has not had any new changes. He has not had any vision loss, no associated chest pain, shortness of breath or palpitations. Unsure about current tetanus status.    PPE Note: I personally donned full PPE for all patient encounters during this visit, including being clean-shaven with an N95 respirator mask, gloves, and goggles.     REVIEW OF SYSTEMS  Constitutional: No recent illness.  Skin: left facial abrasions, no bruises or lacerations.  Eyes: No change in vision.  HENT: small scalp hematoma. No change in hearing. No epistaxis. No loose teeth.  Resp: No shortness of breath or difficulty breathing.  Cardiac: No chest pain.  GI: No abdominal pain. No vomiting.  : chronic urinary issues, no new complaints. No hematuria.   Musc: As above  Neuro:As above, +ASA, No LOC. No paresthesias.  Endocrine: No history of diabetes.  Heme: No known bleeding disorder or anemia.  Psych: No depression.    PAST MEDICAL HISTORY   has a past medical history of Allergy, Arthritis (01/04/2022), CAD (coronary artery disease), Cholelithiasis, Congestive heart failure (HCC) (2016), History of " "bladder infections, History of coronary artery stent placement (8/1/2014), Hyperlipidemia, Hypertension (01/04/2022), Myocardial infarct (HCC) (2007), Statin intolerance (6/26/2018), Urinary bladder disorder (01/04/2022), and Urinary incontinence (01/04/2022).    SOCIAL HISTORY  Social History     Tobacco Use   • Smoking status: Never Smoker   • Smokeless tobacco: Never Used   • Tobacco comment: continued abstinence   Vaping Use   • Vaping Use: Never used   Substance and Sexual Activity   • Alcohol use: No     Alcohol/week: 0.0 oz   • Drug use: Yes     Types: Marijuana, Inhaled     Comment: occasionally marijuana, last use sunday    • Sexual activity: Never     Partners: Female       SURGICAL HISTORY   has a past surgical history that includes other; cholecystectomy (2014); and laser vaporization surgery prostate, comp* (1/12/2022).    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        PHYSICAL EXAM  VITAL SIGNS: BP (!) 169/86   Pulse (!) 53   Temp 36.6 °C (97.8 °F) (Temporal)   Resp 20   Ht 1.676 m (5' 6\")   Wt 69.1 kg (152 lb 5.4 oz)   SpO2 97%   BMI 24.59 kg/m²   Pulse ox interpretation: I interpret this pulse ox as normal.  General: Alert, Oriented x3. No acute distress. Non-toxic appearing.   Head: Normocephalic, left-sided scalp tenderness, superficial abrasion over the left lateral aspect of the zygomas, no midface instability, no jaw pain or step-offs, no crepitus, no raccoon eyes, no knox sign.  Eyes: Pupils: R: 3 mm, L:3 mm. EOMI. Sclerae/Conjunctivae normal in appearance. No Raccoon Eyes.   Nose: No septal hematomas.   Ears: No hemotymapnum, no Knox Sign.   Mouth: No midface instability. No malocclusion.   Neck: No midline tenderness, step-off, or hematoma.   Back: No TTP. No step-off, " or hematoma.   Chest: No retractions. Chest wall is non-tender   Lungs: Clear and equal to auscultation bilaterally. No wheezes, rales, or rhonchi. No respiratory distress.   Cardiovascular: Regular Rate and Rhythm. Normal S1 and S2.   Abdomen: Soft, non-distended, non-tender. No rebound or guarding.   Pelvis: Stable   Musculoskeletal: Full active and passive ROM of right shoulders, elbows, wrists, hips, knees, and ankles without pain or tenderness.   Left Upper Extremity:  - Skin: Global swelling of the deltoids and tenderness over the glenohumeral joint. Pain with palpation and manipulation of the humerus. No abrasions, no lacerations, no ecchymosis  - Motor: Limited ROM at shoulder, full range of motion at elbow, wrist; 5/5 wrist flexion/extension, thumb IP joint flexion/extension (AIN/PIN), abduction/adduction (ulnar) all intact  - Sensation intact to median/ulnar/radial nerves  - 2+ radial pulse, < 2 sec cap refill x 5 digits  Neuro: A&O x4. Motor exam and sensory exam as documented above. No acute cerebellar signs.   Skin:  As above    DIAGNOSTIC STUDIES / PROCEDURES    LABS  Labs Reviewed - No data to display    RADIOLOGY  DX-HUMERUS 2+ LEFT   Final Result      1.  Left proximal humeral fracture involving the neck and probably extending into the greater tuberosity      2.  No other significant finding      DX-SHOULDER 2+ LEFT   Final Result      Mildly displaced left proximal humeral fracture involving the humeral neck and greater tuberosity      CT-HEAD W/O   Final Result      No noncontrast CT evidence of acute intracranial hemorrhage.      Left temporal soft tissue swelling without a defined hematoma visualized        COURSE & MEDICAL DECISION MAKING  Pertinent Labs & Imaging studies reviewed. (See chart for details)    DDX: Skull fracture, contusion, intracranial bleed unlikely, abrasions, fracture/dislocation of the extremity. Neurovascular compromise    MDM    Initial evaluation at 1736:  Patient was  seen and evaluated for symptoms as described above. The patient is nontoxic, has no focal neurological deficits but has pain and discomfort and signs of trauma along the left lateral side of the head with some abrasions and no open lacerations. He also has pain and discomfort along the proximal portion of the humerus. X-rays obtained of the upper extremity show a proximal humerus fracture. He remains neurovascularly intact at this time. CT scan of the head shows no evidence of acute skull fracture or intracranial bleed and no other evidence of intracranial process. Patient remains alert, oriented and has no evolving neurological findings. He is counseled regarding the possibility of concussion or closed head injury and is also updated regarding his left upper extremity fracture. Patient will need to follow-up with orthopedics and I have discussed the case with Dr. Khan would like the patient in a coaptation splint and to call his number tomorrow for likely clinic visit on Thursday of this week. Splint is placed per my note below, patient and family member counseled regarding return precautions including numbness, tingling, discoloration or any other evolving pain complaints. He is well, neurologically intact and is cleared for discharge.    SPLINT PLACEMENT:  The patient was placed in a Coaptation splint and the splint was applied by the tech. Following splint application I rechecked the position and circulation and neuro function. The splint was in good position with good neurovascular function. The upper humerus was well immobilized.    FINAL IMPRESSION  Visit Diagnoses     ICD-10-CM   1. Fall, initial encounter  W19.XXXA   2. Closed head injury, initial encounter  S09.90XA   3. Closed torus fracture of proximal end of left humerus, initial encounter  S42.272A     Electronically signed by: Tigre Millan M.D., 2/22/2022 5:36 PM

## 2022-02-23 NOTE — ED NOTES
Patient verbalized understanding to plan of care and discharge information. Patient reports pain 4/10. Patient in stable condition. Patient wheeled out of ED to person vehicle with  wife.

## 2022-02-28 ENCOUNTER — OFFICE VISIT (OUTPATIENT)
Dept: MEDICAL GROUP | Facility: PHYSICIAN GROUP | Age: 82
End: 2022-02-28
Payer: MEDICARE

## 2022-02-28 VITALS
DIASTOLIC BLOOD PRESSURE: 60 MMHG | TEMPERATURE: 97.8 F | RESPIRATION RATE: 12 BRPM | BODY MASS INDEX: 23.99 KG/M2 | HEART RATE: 66 BPM | HEIGHT: 66 IN | SYSTOLIC BLOOD PRESSURE: 118 MMHG | WEIGHT: 149.3 LBS | OXYGEN SATURATION: 97 %

## 2022-02-28 DIAGNOSIS — N18.31 STAGE 3A CHRONIC KIDNEY DISEASE: ICD-10-CM

## 2022-02-28 DIAGNOSIS — S42.292D OTHER CLOSED DISPLACED FRACTURE OF PROXIMAL END OF LEFT HUMERUS WITH ROUTINE HEALING, SUBSEQUENT ENCOUNTER: ICD-10-CM

## 2022-02-28 DIAGNOSIS — R42 VERTIGO: ICD-10-CM

## 2022-02-28 DIAGNOSIS — M81.8 OTHER OSTEOPOROSIS WITHOUT CURRENT PATHOLOGICAL FRACTURE: ICD-10-CM

## 2022-02-28 DIAGNOSIS — L98.9 SKIN LESION OF SCALP: ICD-10-CM

## 2022-02-28 DIAGNOSIS — Z91.81 RISK FOR FALLS: ICD-10-CM

## 2022-02-28 PROBLEM — S42.202D CLOSED FRACTURE OF PROXIMAL END OF LEFT HUMERUS WITH ROUTINE HEALING: Status: ACTIVE | Noted: 2022-02-28

## 2022-02-28 PROCEDURE — 99215 OFFICE O/P EST HI 40 MIN: CPT | Performed by: INTERNAL MEDICINE

## 2022-02-28 RX ORDER — MECLIZINE HYDROCHLORIDE 25 MG/1
25 TABLET ORAL 3 TIMES DAILY PRN
Qty: 30 TABLET | Refills: 1 | Status: SHIPPED | OUTPATIENT
Start: 2022-02-28 | End: 2022-04-04

## 2022-02-28 RX ORDER — ASPIRIN 81 MG/1
81 TABLET, CHEWABLE ORAL DAILY
COMMUNITY
End: 2023-08-07 | Stop reason: SDUPTHER

## 2022-02-28 ASSESSMENT — PATIENT HEALTH QUESTIONNAIRE - PHQ9: CLINICAL INTERPRETATION OF PHQ2 SCORE: 0

## 2022-02-28 ASSESSMENT — FIBROSIS 4 INDEX: FIB4 SCORE: 2.26

## 2022-02-28 NOTE — ASSESSMENT & PLAN NOTE
New and decompensated problem.  Will obtain bone density to screen for osteoporosis due to recent injurious fall with subsequent fracture.  He will follow up with [facility] later this week to discuss surgical repair with intramedullary nailing.

## 2022-02-28 NOTE — ASSESSMENT & PLAN NOTE
New problem, exam concerning for squamous cell carcinoma, will refer to dermatology for evaluation and excision as indicated.

## 2022-02-28 NOTE — ASSESSMENT & PLAN NOTE
Chronic and ongoing problem, improved most recently following prostate surgery in January 2022. Continue follow up with urology. Continue current medication regimen and follow up periodically to ensure stability.

## 2022-02-28 NOTE — ASSESSMENT & PLAN NOTE
New problem, recent injurious fall with humeral fracture. Will place order for rollator to use outside of the home to reduce fall risk.

## 2022-02-28 NOTE — ASSESSMENT & PLAN NOTE
New problem, demonstrated procedure to complete Epley maneuver, they will try this at home. Will also send in meclizine 25 mg as needed for vertigo attacks.

## 2022-02-28 NOTE — PROGRESS NOTES
Subjective:   Chief Complaint/History of Present Illness:  Arcenio Barnett is a 82 y.o. male established patient who presents today to discuss medical problems as listed below. Arcenio is accompanied by his wife, Shell.    Problem   Risk for Falls    He had an injurious fall on 2/22/2022 outside of a bank, thinks he may have slipped on ice. This resulted in left proximal humerus fracture. He saw Dr. Louis and will have repeat imaging this week with discussions for intramedullary nailing. Needs a rollator to help with balance.     Skin Lesion of Scalp    He has a raised scab on the top of his head, he is unsure how long it has been there. He denies any personal history of skin cancer, has never met with a dermatologist. Not diligent about wearing a hat outside of the house. No bleeding or pain with the scab on his head. He does occasional injure his scalp going in/out of their shed.     Vertigo    He reports new onset vertigo in late February 2022. No prior occurrence. No cerumen build up or inner ear infections. He broke his arm last week (unrelated, occurred outside a bank, he thinks he slipped on ice) and since then he has tried to sit up/roll in bed he has been getting vertigo attacks. He has never tried meclizine, has never undergone Epley maneuver.     Closed Fracture of Proximal End of Left Humerus With Routine Healing    Left humerus xray (2/22/2022): Left proximal humeral fracture involving the neck and probably extending into the greater tuberosity    He presented to the emergency department after ground-level fall outside the bank.  He had immediate pain.  ER placed him in a splint and referred him to [facility].  He follows Dr. Khan who recommends intramedullary nailing, they have follow-up later this week.  Pain fairly well controlled.  No prior evaluation for osteoporosis in recent time.     Ckd (Chronic Kidney Disease) Stage 3, Gfr 30-59 Ml/Min ([facility])    He has evidence of chronic kidney disease on  "lab work since approximately 2014, improved more recently. He has a history of coronary artery disease.  He previously used ibuprofen 200 mg daily.  No known history of nephrolithiasis or diabetes.  He had an exophytic lesion on the kidney noted on an old image in 2012, follow up renal US demonstrated a cyst. He admits to daily use of Advil. He has known BPH and is following with urology.          Current Medications:  Current Outpatient Medications Ordered in Epic   Medication Sig Dispense Refill   • meclizine (ANTIVERT) 25 MG Tab Take 1 Tablet by mouth 3 times a day as needed for Vertigo. 30 Tablet 1   • aspirin (ASA) 81 MG Chew Tab chewable tablet Chew 81 mg every day.     • Mirabegron ER (MYRBETRIQ) 25 MG TABLET SR 24 HR Myrbetriq 25 mg tablet,extended release   Take 1 tablet every day by oral route for 28 days.     • phenazopyridine (PYRIDIUM) 200 MG Tab Take 1 Tablet by mouth 3 times a day as needed. Please begin morning of catheter removal. 6 Tablet 0   • carvedilol (COREG) 12.5 MG Tab TAKE 1 TABLET BY MOUTH TWICE DAILY WITH MEALS (Patient taking differently: Take 12.5 mg by mouth 2 times a day.) 200 Tablet 3     No current Epic-ordered facility-administered medications on file.          Objective:   Physical Exam:    Vitals: /60 (BP Location: Right arm, Patient Position: Sitting, BP Cuff Size: Adult)   Pulse 66   Temp 36.6 °C (97.8 °F) (Temporal)   Resp 12   Ht 1.676 m (5' 6\")   Wt 67.7 kg (149 lb 4.8 oz)   SpO2 97%    BMI: Body mass index is 24.1 kg/m².  Physical Exam  Constitutional:       General: He is not in acute distress.     Appearance: Normal appearance. He is not ill-appearing.   HENT:      Right Ear: Tympanic membrane and ear canal normal. There is no impacted cerumen.      Left Ear: Tympanic membrane and ear canal normal. There is no impacted cerumen.   Eyes:      General: No scleral icterus.     Conjunctiva/sclera: Conjunctivae normal.   Cardiovascular:      Rate and Rhythm: Normal " rate and regular rhythm.      Pulses: Normal pulses.   Pulmonary:      Effort: Pulmonary effort is normal. No respiratory distress.      Breath sounds: Normal breath sounds. No wheezing or rhonchi.   Abdominal:      General: Bowel sounds are normal.      Palpations: Abdomen is soft.      Tenderness: There is no abdominal tenderness.   Musculoskeletal:         General: Swelling, tenderness, deformity and signs of injury present.      Right lower leg: No edema.      Left lower leg: No edema.      Comments: Left upper extremity immobilized   Skin:     General: Skin is warm and dry.      Findings: No bruising or rash.   Psychiatric:         Mood and Affect: Mood normal.         Behavior: Behavior normal.         Thought Content: Thought content normal.         Judgment: Judgment normal.          Assessment and Plan:   Arcenio is a 82 y.o. male with the following:  Problem List Items Addressed This Visit     CKD (chronic kidney disease) stage 3, GFR 30-59 ml/min ([facility])     Chronic and ongoing problem, improved most recently following prostate surgery in January 2022. Continue follow up with urology. Continue current medication regimen and follow up periodically to ensure stability.         Risk for falls     New problem, recent injurious fall with humeral fracture. Will place order for rollator to use outside of the home to reduce fall risk.         Relevant Orders    DME Walker    Skin lesion of scalp     New problem, exam concerning for squamous cell carcinoma, will refer to dermatology for evaluation and excision as indicated.         Relevant Orders    Referral to Dermatology    Vertigo     New problem, demonstrated procedure to complete Epley maneuver, they will try this at home. Will also send in meclizine 25 mg as needed for vertigo attacks.         Relevant Medications    meclizine (ANTIVERT) 25 MG Tab    Other Relevant Orders    DME Walker    Closed fracture of proximal end of left humerus with routine healing      New and decompensated problem.  Will obtain bone density to screen for osteoporosis due to recent injurious fall with subsequent fracture.  He will follow up with [facility] later this week to discuss surgical repair with intramedullary nailing.         Relevant Medications    aspirin (ASA) 81 MG Chew Tab chewable tablet    Other Relevant Orders    DS-BONE DENSITY STUDY (DEXA)    DME Walker      Other Visit Diagnoses     Other osteoporosis without current pathological fracture         Relevant Orders    DS-BONE DENSITY STUDY (DEXA)             Annual Health Assessment Questions:    1.  Are you currently engaging in any exercise or physical activity? No    2.  How would you describe your mood or emotional well-being today? fair    3.  Have you had any falls in the last year? Yes  2/22/22  At [facility]   4.  Have you noticed any problems with your balance or had difficulty walking? Yes  Knees are weak     5.  In the last six months have you experienced any leakage of urine? Yes    6. DPA/Advanced Directive: Patient does not have an Advanced Directive.  A packet and workshop information was given on Advanced Directives.       RTC: Return in about 4 weeks (around 3/28/2022).    I spent a total of 42 minutes with record review, exam, communication with the patient, communication with other providers, and documentation of this encounter.    PLEASE NOTE: This dictation was created using voice recognition software. I have made every reasonable attempt to correct obvious errors, but I expect that there are errors of grammar and possibly content that I did not discover before finalizing the note.      Mirna Charlton, DO  Geriatric and Internal Medicine  [facility]

## 2022-03-01 PROBLEM — M25.512 LEFT SHOULDER PAIN: Status: ACTIVE | Noted: 2022-03-01

## 2022-03-04 ENCOUNTER — PRE-ADMISSION TESTING (OUTPATIENT)
Dept: ADMISSIONS | Facility: MEDICAL CENTER | Age: 82
End: 2022-03-04
Attending: SURGERY
Payer: MEDICARE

## 2022-03-04 DIAGNOSIS — Z01.812 PRE-OPERATIVE LABORATORY EXAMINATION: ICD-10-CM

## 2022-03-04 LAB
ANION GAP SERPL CALC-SCNC: 11 MMOL/L (ref 7–16)
BUN SERPL-MCNC: 27 MG/DL (ref 8–22)
CALCIUM SERPL-MCNC: 9.2 MG/DL (ref 8.5–10.5)
CHLORIDE SERPL-SCNC: 104 MMOL/L (ref 96–112)
CO2 SERPL-SCNC: 26 MMOL/L (ref 20–33)
CREAT SERPL-MCNC: 0.98 MG/DL (ref 0.5–1.4)
ERYTHROCYTE [DISTWIDTH] IN BLOOD BY AUTOMATED COUNT: 48.9 FL (ref 35.9–50)
GLUCOSE SERPL-MCNC: 113 MG/DL (ref 65–99)
HCT VFR BLD AUTO: 35.9 % (ref 42–52)
HGB BLD-MCNC: 11.9 G/DL (ref 14–18)
MCH RBC QN AUTO: 31.6 PG (ref 27–33)
MCHC RBC AUTO-ENTMCNC: 33.1 G/DL (ref 33.7–35.3)
MCV RBC AUTO: 95.5 FL (ref 81.4–97.8)
PLATELET # BLD AUTO: 239 K/UL (ref 164–446)
PMV BLD AUTO: 10.8 FL (ref 9–12.9)
POTASSIUM SERPL-SCNC: 4.1 MMOL/L (ref 3.6–5.5)
RBC # BLD AUTO: 3.76 M/UL (ref 4.7–6.1)
SARS-COV+SARS-COV-2 AG RESP QL IA.RAPID: NOTDETECTED
SODIUM SERPL-SCNC: 141 MMOL/L (ref 135–145)
SPECIMEN SOURCE: NORMAL
WBC # BLD AUTO: 7.4 K/UL (ref 4.8–10.8)

## 2022-03-04 PROCEDURE — 36415 COLL VENOUS BLD VENIPUNCTURE: CPT

## 2022-03-04 PROCEDURE — 87426 SARSCOV CORONAVIRUS AG IA: CPT

## 2022-03-04 PROCEDURE — 80048 BASIC METABOLIC PNL TOTAL CA: CPT

## 2022-03-04 PROCEDURE — 85027 COMPLETE CBC AUTOMATED: CPT

## 2022-03-04 RX ORDER — OMEGA-3 FATTY ACIDS/FISH OIL 300-1000MG
CAPSULE ORAL PRN
COMMUNITY
End: 2023-07-31

## 2022-03-05 ENCOUNTER — HOSPITAL ENCOUNTER (OUTPATIENT)
Facility: MEDICAL CENTER | Age: 82
End: 2022-03-05
Attending: SURGERY | Admitting: SURGERY
Payer: MEDICARE

## 2022-03-05 ENCOUNTER — APPOINTMENT (OUTPATIENT)
Dept: RADIOLOGY | Facility: MEDICAL CENTER | Age: 82
End: 2022-03-05
Attending: SURGERY
Payer: MEDICARE

## 2022-03-05 ENCOUNTER — PHARMACY VISIT (OUTPATIENT)
Dept: PHARMACY | Facility: MEDICAL CENTER | Age: 82
End: 2022-03-05
Payer: COMMERCIAL

## 2022-03-05 ENCOUNTER — ANESTHESIA (OUTPATIENT)
Dept: SURGERY | Facility: MEDICAL CENTER | Age: 82
End: 2022-03-05
Payer: MEDICARE

## 2022-03-05 ENCOUNTER — ANESTHESIA EVENT (OUTPATIENT)
Dept: SURGERY | Facility: MEDICAL CENTER | Age: 82
End: 2022-03-05
Payer: MEDICARE

## 2022-03-05 VITALS
DIASTOLIC BLOOD PRESSURE: 77 MMHG | HEART RATE: 60 BPM | TEMPERATURE: 96.7 F | HEIGHT: 66 IN | OXYGEN SATURATION: 94 % | BODY MASS INDEX: 22.92 KG/M2 | RESPIRATION RATE: 16 BRPM | SYSTOLIC BLOOD PRESSURE: 142 MMHG | WEIGHT: 142.64 LBS

## 2022-03-05 DIAGNOSIS — G89.29 CHRONIC LEFT SHOULDER PAIN: ICD-10-CM

## 2022-03-05 DIAGNOSIS — M25.512 CHRONIC LEFT SHOULDER PAIN: ICD-10-CM

## 2022-03-05 DIAGNOSIS — M25.512 ACUTE PAIN OF LEFT SHOULDER: ICD-10-CM

## 2022-03-05 PROCEDURE — 160048 HCHG OR STATISTICAL LEVEL 1-5: Performed by: SURGERY

## 2022-03-05 PROCEDURE — 73060 X-RAY EXAM OF HUMERUS: CPT | Mod: LT

## 2022-03-05 PROCEDURE — 23412 REPAIR ROTATOR CUFF CHRONIC: CPT | Performed by: SURGERY

## 2022-03-05 PROCEDURE — 160009 HCHG ANES TIME/MIN: Performed by: SURGERY

## 2022-03-05 PROCEDURE — RXMED WILLOW AMBULATORY MEDICATION CHARGE: Performed by: SURGERY

## 2022-03-05 PROCEDURE — 64415 NJX AA&/STRD BRCH PLXS IMG: CPT | Performed by: SURGERY

## 2022-03-05 PROCEDURE — 700105 HCHG RX REV CODE 258: Performed by: SURGERY

## 2022-03-05 PROCEDURE — 160002 HCHG RECOVERY MINUTES (STAT): Performed by: SURGERY

## 2022-03-05 PROCEDURE — 700101 HCHG RX REV CODE 250: Performed by: ANESTHESIOLOGY

## 2022-03-05 PROCEDURE — 501838 HCHG SUTURE GENERAL: Performed by: SURGERY

## 2022-03-05 PROCEDURE — 160035 HCHG PACU - 1ST 60 MINS PHASE I: Performed by: SURGERY

## 2022-03-05 PROCEDURE — 700102 HCHG RX REV CODE 250 W/ 637 OVERRIDE(OP): Performed by: ANESTHESIOLOGY

## 2022-03-05 PROCEDURE — C1713 ANCHOR/SCREW BN/BN,TIS/BN: HCPCS | Performed by: SURGERY

## 2022-03-05 PROCEDURE — 160025 RECOVERY II MINUTES (STATS): Performed by: SURGERY

## 2022-03-05 PROCEDURE — 160041 HCHG SURGERY MINUTES - EA ADDL 1 MIN LEVEL 4: Performed by: SURGERY

## 2022-03-05 PROCEDURE — 700111 HCHG RX REV CODE 636 W/ 250 OVERRIDE (IP): Performed by: ANESTHESIOLOGY

## 2022-03-05 PROCEDURE — 160029 HCHG SURGERY MINUTES - 1ST 30 MINS LEVEL 4: Performed by: SURGERY

## 2022-03-05 PROCEDURE — 502240 HCHG MISC OR SUPPLY RC 0272: Performed by: SURGERY

## 2022-03-05 PROCEDURE — 700111 HCHG RX REV CODE 636 W/ 250 OVERRIDE (IP): Performed by: SURGERY

## 2022-03-05 PROCEDURE — 160046 HCHG PACU - 1ST 60 MINS PHASE II: Performed by: SURGERY

## 2022-03-05 PROCEDURE — A9270 NON-COVERED ITEM OR SERVICE: HCPCS | Performed by: ANESTHESIOLOGY

## 2022-03-05 PROCEDURE — 500891 HCHG PACK, ORTHO MAJOR: Performed by: SURGERY

## 2022-03-05 PROCEDURE — 502000 HCHG MISC OR IMPLANTS RC 0278: Performed by: SURGERY

## 2022-03-05 PROCEDURE — 23615 OPTX PROX HUMRL FX W/INT FIX: CPT | Mod: LT | Performed by: SURGERY

## 2022-03-05 DEVICE — SUTURE ANCHOR TWINFIX 3.5 WITH NEEDLES SMALL JOINT: Type: IMPLANTABLE DEVICE | Site: ARM | Status: FUNCTIONAL

## 2022-03-05 DEVICE — IMPLANTABLE DEVICE: Type: IMPLANTABLE DEVICE | Site: ARM | Status: FUNCTIONAL

## 2022-03-05 RX ORDER — ONDANSETRON 2 MG/ML
4 INJECTION INTRAMUSCULAR; INTRAVENOUS
Status: DISCONTINUED | OUTPATIENT
Start: 2022-03-05 | End: 2022-03-05 | Stop reason: HOSPADM

## 2022-03-05 RX ORDER — HALOPERIDOL 5 MG/ML
1 INJECTION INTRAMUSCULAR
Status: DISCONTINUED | OUTPATIENT
Start: 2022-03-05 | End: 2022-03-05 | Stop reason: HOSPADM

## 2022-03-05 RX ORDER — LIDOCAINE HYDROCHLORIDE 20 MG/ML
INJECTION, SOLUTION EPIDURAL; INFILTRATION; INTRACAUDAL; PERINEURAL PRN
Status: DISCONTINUED | OUTPATIENT
Start: 2022-03-05 | End: 2022-03-05 | Stop reason: SURG

## 2022-03-05 RX ORDER — LIDOCAINE HYDROCHLORIDE 40 MG/ML
SOLUTION TOPICAL PRN
Status: DISCONTINUED | OUTPATIENT
Start: 2022-03-05 | End: 2022-03-05 | Stop reason: SURG

## 2022-03-05 RX ORDER — SODIUM CHLORIDE, SODIUM LACTATE, POTASSIUM CHLORIDE, CALCIUM CHLORIDE 600; 310; 30; 20 MG/100ML; MG/100ML; MG/100ML; MG/100ML
INJECTION, SOLUTION INTRAVENOUS CONTINUOUS
Status: DISCONTINUED | OUTPATIENT
Start: 2022-03-05 | End: 2022-03-05 | Stop reason: HOSPADM

## 2022-03-05 RX ORDER — BUPIVACAINE HYDROCHLORIDE 2.5 MG/ML
INJECTION, SOLUTION EPIDURAL; INFILTRATION; INTRACAUDAL PRN
Status: DISCONTINUED | OUTPATIENT
Start: 2022-03-05 | End: 2022-03-05 | Stop reason: SURG

## 2022-03-05 RX ORDER — LABETALOL HYDROCHLORIDE 5 MG/ML
5 INJECTION, SOLUTION INTRAVENOUS
Status: DISCONTINUED | OUTPATIENT
Start: 2022-03-05 | End: 2022-03-05 | Stop reason: HOSPADM

## 2022-03-05 RX ORDER — ROCURONIUM BROMIDE 10 MG/ML
INJECTION, SOLUTION INTRAVENOUS PRN
Status: DISCONTINUED | OUTPATIENT
Start: 2022-03-05 | End: 2022-03-05 | Stop reason: SURG

## 2022-03-05 RX ORDER — SODIUM CHLORIDE, SODIUM LACTATE, POTASSIUM CHLORIDE, CALCIUM CHLORIDE 600; 310; 30; 20 MG/100ML; MG/100ML; MG/100ML; MG/100ML
INJECTION, SOLUTION INTRAVENOUS CONTINUOUS
Status: ACTIVE | OUTPATIENT
Start: 2022-03-05 | End: 2022-03-05

## 2022-03-05 RX ORDER — HYDRALAZINE HYDROCHLORIDE 20 MG/ML
5 INJECTION INTRAMUSCULAR; INTRAVENOUS
Status: DISCONTINUED | OUTPATIENT
Start: 2022-03-05 | End: 2022-03-05 | Stop reason: HOSPADM

## 2022-03-05 RX ORDER — CEFAZOLIN SODIUM 1 G/3ML
2 INJECTION, POWDER, FOR SOLUTION INTRAMUSCULAR; INTRAVENOUS ONCE
Status: COMPLETED | OUTPATIENT
Start: 2022-03-05 | End: 2022-03-05

## 2022-03-05 RX ORDER — OXYCODONE HYDROCHLORIDE AND ACETAMINOPHEN 5; 325 MG/1; MG/1
2 TABLET ORAL
Status: COMPLETED | OUTPATIENT
Start: 2022-03-05 | End: 2022-03-05

## 2022-03-05 RX ORDER — ONDANSETRON 2 MG/ML
INJECTION INTRAMUSCULAR; INTRAVENOUS PRN
Status: DISCONTINUED | OUTPATIENT
Start: 2022-03-05 | End: 2022-03-05 | Stop reason: SURG

## 2022-03-05 RX ORDER — OXYCODONE HYDROCHLORIDE 5 MG/1
5 TABLET ORAL EVERY 4 HOURS PRN
Qty: 30 TABLET | Refills: 1 | Status: SHIPPED | OUTPATIENT
Start: 2022-03-05 | End: 2022-03-12

## 2022-03-05 RX ORDER — PHENYLEPHRINE HCL IN 0.9% NACL 0.5 MG/5ML
SYRINGE (ML) INTRAVENOUS PRN
Status: DISCONTINUED | OUTPATIENT
Start: 2022-03-05 | End: 2022-03-05 | Stop reason: SURG

## 2022-03-05 RX ORDER — OXYCODONE HYDROCHLORIDE AND ACETAMINOPHEN 5; 325 MG/1; MG/1
1 TABLET ORAL
Status: COMPLETED | OUTPATIENT
Start: 2022-03-05 | End: 2022-03-05

## 2022-03-05 RX ADMIN — BUPIVACAINE HYDROCHLORIDE 25 ML: 2.5 INJECTION, SOLUTION EPIDURAL; INFILTRATION; INTRACAUDAL; PERINEURAL at 12:12

## 2022-03-05 RX ADMIN — OXYCODONE HYDROCHLORIDE AND ACETAMINOPHEN 1 TABLET: 5; 325 TABLET ORAL at 14:15

## 2022-03-05 RX ADMIN — EPHEDRINE SULFATE 5 MG: 50 INJECTION, SOLUTION INTRAVENOUS at 12:58

## 2022-03-05 RX ADMIN — ROCURONIUM BROMIDE 50 MG: 10 INJECTION, SOLUTION INTRAVENOUS at 12:41

## 2022-03-05 RX ADMIN — SUGAMMADEX 200 MG: 100 INJECTION, SOLUTION INTRAVENOUS at 13:40

## 2022-03-05 RX ADMIN — SODIUM CHLORIDE, POTASSIUM CHLORIDE, SODIUM LACTATE AND CALCIUM CHLORIDE: 600; 310; 30; 20 INJECTION, SOLUTION INTRAVENOUS at 11:51

## 2022-03-05 RX ADMIN — Medication 100 MCG: at 12:58

## 2022-03-05 RX ADMIN — CEFAZOLIN 2 G: 330 INJECTION, POWDER, FOR SOLUTION INTRAMUSCULAR; INTRAVENOUS at 12:48

## 2022-03-05 RX ADMIN — ONDANSETRON 4 MG: 2 INJECTION INTRAMUSCULAR; INTRAVENOUS at 13:32

## 2022-03-05 RX ADMIN — LIDOCAINE HYDROCHLORIDE 2 ML: 20 INJECTION, SOLUTION EPIDURAL; INFILTRATION; INTRACAUDAL at 12:39

## 2022-03-05 RX ADMIN — PROPOFOL 100 MG: 10 INJECTION, EMULSION INTRAVENOUS at 12:41

## 2022-03-05 RX ADMIN — SODIUM CHLORIDE, POTASSIUM CHLORIDE, SODIUM LACTATE AND CALCIUM CHLORIDE: 600; 310; 30; 20 INJECTION, SOLUTION INTRAVENOUS at 13:58

## 2022-03-05 RX ADMIN — LIDOCAINE HYDROCHLORIDE 4 ML: 40 SOLUTION TOPICAL at 12:42

## 2022-03-05 RX ADMIN — FENTANYL CITRATE 100 MCG: 50 INJECTION, SOLUTION INTRAMUSCULAR; INTRAVENOUS at 12:39

## 2022-03-05 RX ADMIN — LIDOCAINE HYDROCHLORIDE 1 ML: 20 INJECTION, SOLUTION EPIDURAL; INFILTRATION; INTRACAUDAL; PERINEURAL at 12:12

## 2022-03-05 RX ADMIN — Medication 100 MCG: at 12:51

## 2022-03-05 ASSESSMENT — FIBROSIS 4 INDEX: FIB4 SCORE: 1.97

## 2022-03-05 ASSESSMENT — PAIN DESCRIPTION - PAIN TYPE
TYPE: SURGICAL PAIN
TYPE: ACUTE PAIN

## 2022-03-05 NOTE — ANESTHESIA PROCEDURE NOTES
Airway    Date/Time: 3/5/2022 12:42 PM  Performed by: Yariel Hammer M.D.  Authorized by: Yariel Hammer M.D.     Location:  OR  Urgency:  Elective  Difficult Airway: No    Indications for Airway Management:  Anesthesia      Spontaneous Ventilation: absent    Sedation Level:  Deep  Preoxygenated: Yes    Patient Position:  Sniffing  Mask Difficulty Assessment:  2 - vent by mask + OA or adjuvant +/- NMBA  Final Airway Type:  Endotracheal airway  Final Endotracheal Airway:  ETT  Cuffed: Yes    Technique Used for Successful ETT Placement:  Direct laryngoscopy    Insertion Site:  Oral  Blade Type:  [place]  Laryngoscope Blade/Videolaryngoscope Blade Size:  3  ETT Size (mm):  7.5  Measured from:  Teeth  ETT to Teeth (cm):  23  Placement Verified by: auscultation and capnometry    Cormack-Lehane Classification:  Grade I - full view of glottis  Number of Attempts at Approach:  1   Atraumatic DLx1

## 2022-03-05 NOTE — ANESTHESIA TIME REPORT
Anesthesia Start and Stop Event Times     Date Time Event    3/5/2022 1230 Ready for Procedure     1231 Anesthesia Start     1401 Anesthesia Stop        Responsible Staff  03/05/22    Name Role Begin End    Yariel Hammer M.D. Anesth 1231 1401        Preop Diagnosis (Free Text):  Pre-op Diagnosis     Left shoulder pain [M25.512]        Preop Diagnosis (Codes):  Diagnosis Information     Diagnosis Code(s): Left shoulder pain [M25.512]        Premium Reason  E. Weekend    Comments: nerve block

## 2022-03-05 NOTE — H&P
Surgery Orthopedic History & Physical Note    Date  3/5/2022    Primary Care Physician  Mirna Charlton D.O.      Pre-Op Diagnosis Codes:     * Left shoulder pain [M25.512]    HPI  This is a 82 y.o. male who presented with left displaced proximal humerus fracture.     Past Medical History:   Diagnosis Date   • Allergy    • Arm fracture, left 02/22/2022   • Arthritis 01/04/2022    RA hands/knees   • BPH (benign prostatic hyperplasia)    • CAD (coronary artery disease)     cardiac stents x 2 2000, 2007 [facility] 2007 prox LAD   • Cholelithiasis    • CKD (chronic kidney disease) stage 3, GFR 30-59 ml/min ([facility])     Per Problem List   • Congestive heart failure ([facility]) 2016   • Fall 02/22/2022    Left Arm Fracture   • Fracture    • History of bladder infections    • History of coronary artery stent placement 08/01/2014    Prox LAD, also RCA   • Hyperlipidemia    • Hypertension 01/04/2022    medicated   • Myocardial infarct ([facility]) 2007    2 stents placed   • Prediabetes     Per Problem List   • Statin intolerance 6/26/2018   • Urinary bladder disorder 01/04/2022    vega in place since 10/21   • Urinary incontinence 01/04/2022   • Vertigo        Past Surgical History:   Procedure Laterality Date   • CT LASER VAPORIZATION SURGERY PROSTATE, COMP*  1/12/2022    Procedure: ELECTROVAPORIZATION, PROSTATE, TRANSURETHRAL;  Surgeon: Jamaal Liz M.D.;  Location: SURGERY [facility];  Service: Urology   • CHOLECYSTECTOMY  2014   • OTHER      heart stent placement times 2   • OTHER CARDIAC SURGERY      Stents x2       Current Facility-Administered Medications   Medication Dose Route Frequency Provider Last Rate Last Admin   • ceFAZolin (ANCEF) injection 2 g  2 g Intravenous Once Vishal Louis M.D.       • lidocaine (XYLOCAINE) 1 % injection 0.5 mL  0.5 mL Intradermal Once PRN Vishal Louis M.D.       • lactated ringers infusion   Intravenous Continuous Vishal Louis M.D. 10 mL/hr at  03/05/22 1151 New Bag at 03/05/22 1151       Social History     Socioeconomic History   • Marital status:      Spouse name: Not on file   • Number of children: Not on file   • Years of education: Not on file   • Highest education level: Not on file   Occupational History   • Not on file   Tobacco Use   • Smoking status: Never Smoker   • Smokeless tobacco: Never Used   • Tobacco comment: continued abstinence   Vaping Use   • Vaping Use: Never used   Substance and Sexual Activity   • Alcohol use: No     Alcohol/week: 0.0 oz   • Drug use: Yes     Types: Marijuana, Inhaled     Comment: Occasional Marijuana use   • Sexual activity: Never     Partners: Female   Other Topics Concern   • Not on file   Social History Narrative    He is  to [place]. He relocated to [place] around 2015 from a small town in [place]. He is dissatisfied with living in [place] and [place]     Social Determinants of Health     Financial Resource Strain: Not on file   Food Insecurity: Not on file   Transportation Needs: Not on file   Physical Activity: Not on file   Stress: Not on file   Social Connections: Not on file   Intimate Partner Violence: Not on file   Housing Stability: Not on file       Family History   Problem Relation Age of Onset   • Cancer Father 57        lung cancer   • Cancer Brother 52        lung cancer   • Cancer Sister         breast   • Cancer Sister         breast   • Cancer Sister         pancreatic   • Hyperlipidemia Mother    • Stroke Mother        Allergies  Atorvastatin and Statins [hmg-coa-r inhibitors]    Review of Systems  Negative    Physical Exam  Left proximal humerus ttp    Vital Signs  Blood Pressure : (!) 175/78   Temperature: 36.7 °C (98.1 °F)   Pulse: 63   Respiration: 18   Pulse Oximetry: 96 %       Labs:  Recent Labs     03/04/22  0927   WBC 7.4   RBC 3.76*   HEMOGLOBIN 11.9*   HEMATOCRIT 35.9*   MCV 95.5   MCH 31.6   MCHC 33.1*   RDW 48.9   PLATELETCT 239   MPV 10.8      Recent Labs     03/04/22  0927   SODIUM 141   POTASSIUM 4.1   CHLORIDE 104   CO2 26   GLUCOSE 113*   BUN 27*   CREATININE 0.98   CALCIUM 9.2               Radiology:  DX-PORTABLE FLUOROSCOPY < 1 HOUR    (Results Pending)   DX-HUMERUS 2+ LEFT    (Results Pending)         Assessment/Plan:  Pre-Op Diagnosis Codes:     * Left proximal humerus fracture.   Procedure(s):  LEFT HUMERAL INTRAMEDILLARY NAILING   We discussed conservative vs. Operative treatment and patient elects for above mentioned procedure.

## 2022-03-05 NOTE — ANESTHESIA PROCEDURE NOTES
Peripheral Block    Date/Time: 3/5/2022 12:12 PM  Performed by: Yariel Hammer M.D.  Authorized by: Yariel Hammer M.D.     Patient Location:  Pre-op  Start Time:  3/5/2022 12:12 PM  End Time:  3/5/2022 12:16 PM  Reason for Block: at surgeon's request and post-op pain management ONLY    patient identified, IV checked, site marked, risks and benefits discussed, surgical consent, monitors and equipment checked, pre-op evaluation and timeout performed    Patient Position:  Supine  Prep: ChloraPrep    Monitoring:  Heart rate, continuous pulse ox and cardiac monitor  Block Region:  Upper Extremity  Upper Extremity - Block Type:  BRACHIAL PLEXUS block, Interscalene approach    Laterality:  Left  Procedures: ultrasound guided  Image captured, interpreted and electronically stored.  Local Infiltration:  Lidocaine  Strength:  2 %  Dose:  1 ml  Block Type:  Single-shot  Needle Length:  100mm  Needle Gauge:  21 G  Needle Localization:  Ultrasound guidance  Injection Assessment:  Negative aspiration for heme, no paresthesia on injection, incremental injection and local visualized surrounding nerve on ultrasound  Evidence of intravascular injection: No     US Guided Interscalene Brachial Plexus Block   US transducer placed on the neck in oblique plane approximately at the level of C6.  Anterior and Middle Scalene (MSM) muscles identified with nerve trunks identified between the muscles.  Needle inserted lateral to probe and advanced under direct visualization through the MSM into a perineural position.  After negative aspiration, LA injected with ease and visualized surrounding the nerve trunks. U/S picture in paper chart.

## 2022-03-05 NOTE — DISCHARGE INSTRUCTIONS
ACTIVITY: Rest and take it easy for the first 24 hours.  A responsible adult is recommended to remain with you during that time.  It is normal to feel sleepy.  We encourage you to not do anything that requires balance, judgment or coordination.    MILD FLU-LIKE SYMPTOMS ARE NORMAL. YOU MAY EXPERIENCE GENERALIZED MUSCLE ACHES, THROAT IRRITATION, HEADACHE AND/OR SOME NAUSEA.    FOR 24 HOURS DO NOT:  Drive, operate machinery or run household appliances.  Drink beer or alcoholic beverages.   Make important decisions or sign legal documents.    SPECIAL INSTRUCTIONS:           Intramedullary Nailing of Humeral Shaft Fracture, Care After  This sheet gives you information about how to care for yourself after your procedure. Your health care provider may also give you more specific instructions. If you have problems or questions, contact your health care provider.  What can I expect after the procedure?  After your procedure, it is common to have:  · Pain and swelling in your upper arm.  Follow these instructions at home:  If you have a sling:  · Wear the sling as told by your health care provider. You may wear it for up to 12 weeks after surgery. Remove it only as told by your health care provider.  · Loosen the sling if your fingers tingle, become numb, or turn cold and blue.  · Keep the sling clean.  · If the sling is not waterproof:  ? Do not let it get wet.  ? Cover it with a watertight covering when you take a bath or shower.  Bathing  · Do not take baths, swim, or use a hot tub until your health care provider approves. Ask your health care provider if you can take showers. You may only be allowed to take sponge baths for bathing.  · Keep your bandage (dressing) dry until your health care provider says it can be removed.  Incision care    · Follow instructions from your health care provider about how to take care of your incision. Make sure you:  ? Wash your hands with soap and water before you change your bandage  (dressing). If soap and water are not available, use hand .  ? Change your dressing as told by your health care provider.  ? Leave stitches (sutures), skin glue, or adhesive strips in place. These skin closures may need to stay in place for 2 weeks or longer. If adhesive strip edges start to loosen and curl up, you may trim the loose edges. Do not remove adhesive strips completely unless your health care provider tells you to do that.  · Check your incision area every day for signs of infection. Check for:  ? Redness, swelling, or pain.  ? Fluid or blood.  ? Warmth.  ? Pus or a bad smell.  Managing pain, stiffness, and swelling    · If directed, put ice on the injured area.  ? Put ice in a plastic bag.  ? Place a towel between your skin and the bag.  ? Leave the ice on for 20 minutes, 2-3 times a day.  · Move your fingers often to avoid stiffness and to lessen swelling.  · Raise (elevate) the injured area above the level of your heart while you are sitting or lying down.  Driving  · Do not drive or use heavy machinery while taking prescription pain medicine or until your health care provider approves.  Activity  · Rest and avoid activities that require a lot of effort. Ask your health care provider what activities are safe for you.  · Follow instructions from your health care provider about:  ? Whether you may gently use your hand and elbow immediately after surgery.  ? When to start doing gentle range of motion movements with your shoulder and elbow. It is important to do these movements to prevent loss of motion.  ? How much weight you can put on your arm.  ? Avoiding lifting, pulling, and pushing. You may need to avoid these actions for up to 12 weeks.  · Avoid rotating motions with your arm.  · If physical therapy was prescribed, do exercises as directed.  Medicines  · Take over-the-counter and prescription medicines only as told by your health care provider.  · If you were prescribed an antibiotic  medicine, take it as told by your health care provider. Do not stop taking the antibiotic even if you start to feel better.  General instructions  · To prevent or treat constipation while you are taking prescription pain medicine, your health care provider may recommend that you:  ? Drink enough fluid to keep your urine clear or pale yellow.  ? Take over-the-counter or prescription medicines.  ? Eat foods that are high in fiber, such as fresh fruits and vegetables, whole grains, and beans.  ? Limit foods that are high in fat and processed sugars, such as fried and sweet foods.  · Do not use any products that contain nicotine or tobacco, such as cigarettes and e-cigarettes. These can delay bone healing. If you need help quitting, ask your health care provider.  · Wear compression stockings as told by your health care provider. These stockings help to prevent blood clots and reduce swelling in your legs.  · Keep all follow-up visits as told by your health care provider. This is important.  Contact a health care provider if:  · You have redness, swelling, or pain around your incision.  · You have fluid or blood coming from your incision.  · Your incision feels warm to the touch.  · You have pus or a bad smell coming from your incision.  · You have a fever.  Get help right away if:  · You have severe pain.  · Your incision breaks open.  · You fall.  Summary  · Ask your health care provider what activities are safe for you while you recover.  · Check your incision area every day for signs of infection, such as redness or swelling.  · If physical therapy was prescribed, do exercises as directed.  · Keep all follow-up visits as told by your health care provider. This is important.      Surgery for Rotator Cuff Tear, Care After  This sheet gives you information about how to care for yourself after your procedure. Your health care provider may also give you more specific instructions. If you have problems or questions,  contact your health care provider.  What can I expect after the procedure?  After the procedure, it is common to have:  · Swelling.  · Pain.  · Stiffness.  · Tenderness.  Follow these instructions at home:  If you have a sling or a shoulder immobilizer:  · Wear it as told by your health care provider. Remove it only as told by your health care provider.  · Loosen it if your fingers tingle, become numb, or turn cold and blue.  · Keep it clean.  Bathing  · Do not take baths, swim, or use a hot tub until your health care provider approves. Ask your health care provider if you may take showers. You may only be allowed to take sponge baths.  · Keep your bandage (dressing) dry until your health care provider says it can be removed.  · If your sling or shoulder immobilizer is not waterproof:  ? Do not let it get wet.  ? Remove it when you take a bath or shower as told by your health care provider. Once the sling or shoulder immobilizer is removed, try not to move your shoulder until your health care provider says that you can.  Incision care    · Follow instructions from your health care provider about how to take care of your incision. Make sure you:  ? Wash your hands with soap and water before and after you change your dressing. If soap and water are not available, use hand .  ? Change your dressing as told by your health care provider.  ? Leave stitches (sutures), skin glue, or adhesive strips in place. These skin closures may need to stay in place for 2 weeks or longer. If adhesive strip edges start to loosen and curl up, you may trim the loose edges. Do not remove adhesive strips completely unless your health care provider tells you to do that.  · Check your incision area every day for signs of infection. Check for:  ? More redness, swelling, or pain.  ? More fluid or blood.  ? Warmth.  ? Pus or a bad smell.  Managing pain, stiffness, and swelling    · If directed, put ice on your shoulder area.  ? Put ice  in a plastic bag.  ? Place a towel between your skin and the bag.  ? Leave the ice on for 20 minutes, 2-3 times a day.  · Move your fingers often to reduce stiffness and swelling.  · Raise (elevate) your upper body on pillows when you lie down and when you sleep.  ? Do not sleep on the front of your body (abdomen).  ? Do not sleep on the side that your surgery was performed on.  Medicines  · Take over-the-counter and prescription medicines only as told by your health care provider.  · Ask your health care provider if the medicine prescribed to you:  ? Requires you to avoid driving or using heavy machinery.  ? Can cause constipation. You may need to take actions to prevent or treat constipation, such as:  § Drink enough fluid to keep your urine pale yellow.  § Take over-the-counter or prescription medicines.  § Eat foods that are high in fiber, such as beans, whole grains, and fresh fruits and vegetables.  § Limit foods that are high in fat and processed sugars, such as fried or sweet foods.  Driving  · Do not drive for 24 hours if you were given a sedative during your procedure.  · Do not drive while wearing a sling or a shoulder immobilizer. Ask your health care provider when it is safe to drive.  Activity  · Do not use your arm to support your body weight until your health care provider says that you can.  · Do not lift or hold anything with your arm until your health care provider approves.  · Return to your normal activities as told by your health care provider. Ask your health care provider what activities are safe for you.  · Do exercises as told by your health care provider.  General instructions  · Do not use any products that contain nicotine or tobacco, such as cigarettes, e-cigarettes, and chewing tobacco. These can delay healing after surgery. If you need help quitting, ask your health care provider.  · Keep all follow-up visits as told by your health care provider. This is important.  Contact a health  care provider if:  · You have a fever.  · You have more redness, swelling, or pain around your incision.  · You have more fluid or blood coming from your incision.  · Your incision feels warm to the touch.  · You have pus or a bad smell coming from your incision.  · You have pain that gets worse or does not get better with medicine.  Get help right away if:  · You have severe pain.  · You lose feeling in your arm or hand.  · Your hand or fingers turn very pale or blue.  Summary  · If you have a sling, wear it as told by your health care provider. Remove it only as told by your health care provider.  · Change your dressing as told by your health care provider. Check the incision area every day for signs of infection.  · If directed, put ice on your shoulder area 2-3 times a day.  · Do not use your arm to lift anything or to support your body weight until your health care provider says that you can.        DIET: To avoid nausea, slowly advance diet as tolerated, avoiding spicy or greasy foods for the first day.  Add more substantial food to your diet according to your physician's instructions.  Babies can be fed formula or breast milk as soon as they are hungry.  INCREASE FLUIDS AND FIBER TO AVOID CONSTIPATION.    SURGICAL DRESSING/BATHING: Keep dressing clean and dry until follow up appointment. Call if dressing becomes wet.    FOLLOW-UP APPOINTMENT:  A follow-up appointment should be arranged with your doctor in 1-2 weeks; call to schedule.    You should CALL YOUR PHYSICIAN if you develop:  Fever greater than 101 degrees F.  Pain not relieved by medication, or persistent nausea or vomiting.  Excessive bleeding (blood soaking through dressing) or unexpected drainage from the wound.  Extreme redness or swelling around the incision site, drainage of pus or foul smelling drainage.  Inability to urinate or empty your bladder within 8 hours.  Problems with breathing or chest pain.    You should call 911 if you develop  problems with breathing or chest pain.  If you are unable to contact your doctor or surgical center, you should go to the nearest emergency room or urgent care center.  Physician's telephone #: [phone]    If any questions arise, call your doctor.  If your doctor is not available, please feel free to call the Surgical Center at [phone].     A registered nurse may call you a few days after your surgery to see how you are doing after your procedure.    MEDICATIONS: Resume taking daily medication.  Take prescribed pain medication with food.  If no medication is prescribed, you may take non-aspirin pain medication if needed.  PAIN MEDICATION CAN BE VERY CONSTIPATING.  Take a stool softener or laxative such as senokot, pericolace, or milk of magnesia if needed.    Prescription given for Oxycodone.  Last pain medication given at 2:15pm.    If your physician has prescribed pain medication that includes Acetaminophen (Tylenol), do not take additional Acetaminophen (Tylenol) while taking the prescribed medication.    Depression / Suicide Risk    As you are discharged from this [facility] facility, it is important to learn how to keep safe from harming yourself.    Recognize the warning signs:  · Abrupt changes in personality, positive or negative- including increase in energy   · Giving away possessions  · Change in eating patterns- significant weight changes-  positive or negative  · Change in sleeping patterns- unable to sleep or sleeping all the time   · Unwillingness or inability to communicate  · Depression  · Unusual sadness, discouragement and loneliness  · Talk of wanting to die  · Neglect of personal appearance   · Rebelliousness- reckless behavior  · Withdrawal from people/activities they love  · Confusion- inability to concentrate     If you or a loved one observes any of these behaviors or has concerns about self-harm, here's what you can do:  · Talk about it- your feelings and reasons for harming  yourself  · Remove any means that you might use to hurt yourself (examples: pills, rope, extension cords, firearm)  · Get professional help from the community (Mental Health, Substance Abuse, psychological counseling)  · Do not be alone:Call your Safe Contact- someone whom you trust who will be there for you.  · Call your local CRISIS HOTLINE 435-5712 or 314-501-5252  · Call your local Children's Mobile Crisis Response Team [place] (333) 016-3444 or www.GoNogging  · Call the toll free National Suicide Prevention Hotlines   · National Suicide Prevention Lifeline 567-003-KLFH (2395)  · National Hope Line Network 800-SUICIDE (259-9096)

## 2022-03-05 NOTE — ANESTHESIA PREPROCEDURE EVALUATION
Case: [id number] Date/Time: 03/05/22 1245    Procedure: LEFT HUMERAL INTRAMEDILLARY NAILING    Diagnosis: Left shoulder pain [M25.512]    Pre-op diagnosis: Left shoulder pain [M25.512]    Location: [place] [address] / SURGERY [facility]    Surgeons: Vishal Louis M.D.          Relevant Problems   CARDIAC   (positive) CAD s/p MVPCI   (positive) Essential hypertension   (positive) Nonrheumatic aortic valve insufficiency         (positive) CKD (chronic kidney disease) stage 3, GFR 30-59 ml/min (HCC)      Other   (positive) Closed fracture of proximal end of left humerus with routine healing       Physical Exam    Airway   Mallampati: II  TM distance: >3 FB  Neck ROM: full       Cardiovascular - normal exam  Rhythm: regular  Rate: normal  (-) murmur     Dental - normal exam           Pulmonary - normal exam  Breath sounds clear to auscultation     Abdominal    Neurological - normal exam                 Anesthesia Plan    ASA 3   ASA physical status 3 criteria: CAD/stents (> 3 months)    Plan - general and peripheral nerve block     Peripheral nerve block will be post-op pain control  Airway plan will be ETT          Induction: intravenous    Postoperative Plan: Postoperative administration of opioids is intended.    Pertinent diagnostic labs and testing reviewed    Informed Consent:    Anesthetic plan and risks discussed with patient.    Use of blood products discussed with: patient whom consented to blood products.

## 2022-03-05 NOTE — LETTER
March 2, 2022    Patient Name: Arcenio Barnett  Surgeon Name: Vishal Morales M.D.  Surgery Facility: [facility] ([address])  Surgery Date: 3/5/2022    The time of your surgery is not final and may change up to and until the day of your surgery. You will be contacted 24-48 hours prior to your surgery date with your check-in and surgery time.    If you will not be at one of the below numbers please call/text the surgery scheduler at [phone]  Preferred Phone: [phone]    BEFORE YOUR SURGERY  Pre Registration and/or Lab Work must be done within and no earlier than 28 days prior to your surgery date. Please call [facility] at [phone] for an appointment as soon as possible.     Instructions: Bring a list of all medications you are taking including the dosing and frequency.    Please refrain from smoking any substance after midnight prior to surgery. Smoking may interfere with the anesthetic and frequently produces nausea during the recovery period.    Continue taking all lifesaving medications. Including the morning of your surgery with small sip of water.    Please read the MEDICATION INSTRUCTIONS below completely.    DAY OF YOUR SURGERY  Nothing to eat or drink after midnight     Please arrive at the hospital/surgery center at the check-in time provided.     An adult will need to bring you and take you home after your surgery.     AFTER YOUR SURGERY   DR MIKE MORALES MEDICAL ASSISTANT WILL CALL OR TEXT YOU WITH YOUR POST OP APPT     TIME OFF WORK  FMLA or Disability forms can be faxed directly to: [phone] or you may drop them off at [address] [place] [address]. Our office charges a $35.00 fee per form. Forms will be completed within 10 business days of drop off and payment received. For the status of your forms you may contact our disability office directly [phone].      MEDICATION INSTRUCTIONS  The following medications should be  stopped a minimum of 10 days prior to surgery:  All over the counter, Supplements & Herbal medications    Anorectics: Phentermine (Adipex-P, Lomaira and Suprenza), Phentermine-topiramate (Qsymia), Bupropion-naltrexone (Contrave)    Opiod Partial Agonists/Opioid Antagonists: Buprenorphine (Subocone, Belbuca, Butrans, Probuphine Implant, Sublocade), Naltrexone (ReVia, Vivitrol), Naloxone    Amphetamines: Dextroamphetamine/Amphetamine (Adderall, Mydayis), Methylphenidate Hydrochloride (Concerta, Metadate, Methylin, Ritalin)    The following medications should be stopped 5 days prior to surgery:  Blood Thinners: Any Aspirin, Aspirin products, anti-inflammatories such as ibuprofen and any blood thinners such as Coumadin and Plavix. Please consult your prescribing physician if you are on life saving blood thinners, in regards to when to stop medications prior to surgery.     The following medications should be stopped a minimum of 3 days prior to surgery:  PDE-5 inhibitors: Sildenafil (Viagra), Tadalafil (Cialis), Vardenafil (Levitra), Avanafil (Stendra)    MAO Inhibitors: Rasagiline (Azilect), Selegiline (Eldepryl, Emsam, Selapar), Isocarboxazid (Marplan), Phenelzine (Nardil)

## 2022-03-06 NOTE — OP REPORT
DATE OF SERVICE:  03/05/2022     SURGEON:  Vishal Louis MD     ASSISTANT:  Pernell Sanders PA-C     PREOPERATIVE DIAGNOSIS:  Left comminuted proximal humerus fracture.     POSTOPERATIVE DIAGNOSES:  1.  Left comminuted proximal humerus fracture.  2.  Left chronic appearing massive rotator cuff tear.     PROCEDURES:  1.  Left proximal humerus open reduction and internal fixation with   cephalomedullary nail.  2.  Open repair of massive rotator cuff repair.     ANESTHESIOLOGIST:  Yariel Hammer MD     ANESTHESIA:  General with upper extremity nerve block for pain control.     COMPLICATIONS:  None noted.     DRAINS:  None.     SPECIMENS:  None.     ESTIMATED BLOOD LOSS:  50 mL     INDICATIONS:  The patient is an 82-year-old gentleman well known to me through   my outpatient clinic for the above-mentioned diagnosis.  He believes now and   agrees he has failed conservative treatment and is a candidate for the   above-mentioned procedure.  Prior to the procedure, he understood the risks,   benefits and alternatives to surgery.  He understood the risks include but not   be limited to the risk of infection requiring repeat surgery, bleeding   requiring blood transfusion, nerve, blood vessel, tendon injury requiring   repair, chronic pain, nonunion, malunion, hardware failure, requiring revision   surgery, dissatisfaction with the outcome, acquiring coronavirus and its   complications, DVT, pulmonary embolism, heart attack, stroke and death.  The   patient stated despite these risks, he wished to proceed with surgery.     DESCRIPTION OF PROCEDURE:  The patient was met in the preoperative holding   area.  His left shoulder was initialed as the correct operative site.  He had   an opportunity to ask questions, all questions were answered and informed   consent was obtained.     The patient was brought to the operating room, laid supine on the operating   room table.  All bony and dependent prominences were  well padded.  Upper   extremity nerve block and general anesthesia were induced without known   complication.  Ancef was administered for infection prophylaxis.  He was   placed in well-padded modified beach chair position and the left upper   extremity was prepped and draped in the usual sterile fashion.  A formal   timeout was performed, in which all parties agreed upon the correct patient,   procedure and operative site.  We began the procedure by making a longitudinal   incision centered of the anterior distal aspect of the acromion.  I split the   deltoid.  I immediately encountered chronic appearing retracted tear all of   supraspinatus and the upper half of infraspinatus.  There was a small   hemarthrosis in the joint which was evacuated.  He had well preserved glenoid   and humeral head articular cartilage.  Just medial to the footprint of   supraspinatus I passed the starting guidewire for the Smith and Nephew nail (I   chose Smith and Nephew the option of locking the screws), I confirmed with   fluoroscopy the guidewire was center-center, I passed the ball-tipped   guidewire down the canal measured for 24 cm x7 mm nail, placed the nail,   placed 2 proximal interlocking screws through the jig and one distally while   pulling traction to improve the alignment of the fracture.  The distal one was   done with appropriate [place] technique.  I then confirmed improved alignment   of the fracture without obvious hardware complication.  I then used 1 Smith   and Nephew 3.5 mm titanium anchor in the footprint of supraspinatus and   infraspinatus with #2 FiberWire suture to repair the rotator cuff to its   footprint and on the anterior leading edge of the supraspinatus I repaired   through transosseous tunnel with the remaining #2 FiberWire suture.  Next, I   took the arm through range of motion.  There was no crepitation, felt stable,   rotator cuff repair appeared stable and copiously irrigated the wounds with    normal saline and repaired the deltoid split with 0 Vicryl suture and closed   both incisions with 2-0 Monocryl suture and skin stapler, all covered with   sterile Xeroform, 4x4's, Ioban and sling.  The patient then awoke from   anesthesia without known complication and was transferred in a stable   condition to PACU where there were no immediate postoperative complaints.     POSTOPERATIVE PLAN:  The patient will be discharged home per same day   criteria, Follow up in casting 2 weeks for staple removal.  Physical therapy   for range of motion to advance activity as tolerated given his age and follow   up with me in 3-4 weeks for radiographs and wound check.        ______________________________  MD MEGA Foote/GORDO    DD:  03/05/2022 17:03  DT:  03/05/2022 17:27    Job#:  [id number]

## 2022-03-06 NOTE — ANESTHESIA POSTPROCEDURE EVALUATION
Patient: Arcenio Barnett    Procedure Summary     Date: 03/05/22 Room / Location: [place] [address] / SURGERY [facility]    Anesthesia Start: 1231 Anesthesia Stop: 1401    Procedure: LEFT HUMERAL INTRAMEDILLARY NAILING; LEFT ROTATOR CUFF REPAIR (N/A Arm Upper) Diagnosis:       Left shoulder pain      (LEFT HUMERAL FRACTURE; LEFT ROTATOR CUFF REPAIR)    Surgeons: Vishal Louis M.D. Responsible Provider: Yariel Hammer M.D.    Anesthesia Type: general, peripheral nerve block ASA Status: 3          Final Anesthesia Type: general, peripheral nerve block  Last vitals  BP   Blood Pressure : 142/77    Temp   35.9 °C (96.7 °F)    Pulse   60   Resp   16    SpO2   94 %      Anesthesia Post Evaluation    Patient location during evaluation: PACU  Patient participation: complete - patient participated  Level of consciousness: awake and alert    Airway patency: patent  Anesthetic complications: no  Cardiovascular status: hemodynamically stable  Respiratory status: acceptable  Hydration status: euvolemic    PONV: none          No complications documented.     Nurse Pain Score: 0 (NPRS)

## 2022-03-24 ENCOUNTER — HOSPITAL ENCOUNTER (OUTPATIENT)
Dept: RADIOLOGY | Facility: MEDICAL CENTER | Age: 82
End: 2022-03-24
Attending: INTERNAL MEDICINE
Payer: MEDICARE

## 2022-03-24 DIAGNOSIS — M81.8 OTHER OSTEOPOROSIS WITHOUT CURRENT PATHOLOGICAL FRACTURE: ICD-10-CM

## 2022-03-24 DIAGNOSIS — S42.292D OTHER CLOSED DISPLACED FRACTURE OF PROXIMAL END OF LEFT HUMERUS WITH ROUTINE HEALING, SUBSEQUENT ENCOUNTER: ICD-10-CM

## 2022-03-24 PROCEDURE — 77080 DXA BONE DENSITY AXIAL: CPT

## 2022-03-30 ENCOUNTER — PATIENT MESSAGE (OUTPATIENT)
Dept: MEDICAL GROUP | Facility: PHYSICIAN GROUP | Age: 82
End: 2022-03-30
Payer: MEDICARE

## 2022-03-30 RX ORDER — GABAPENTIN 100 MG/1
100 CAPSULE ORAL
COMMUNITY
End: 2022-03-30 | Stop reason: SDUPTHER

## 2022-03-30 RX ORDER — GABAPENTIN 100 MG/1
100 CAPSULE ORAL
Qty: 90 CAPSULE | Refills: 1 | Status: SHIPPED | OUTPATIENT
Start: 2022-03-30 | End: 2022-08-01

## 2022-03-30 NOTE — TELEPHONE ENCOUNTER
Requested Prescriptions     Pending Prescriptions Disp Refills   • gabapentin (NEURONTIN) 100 MG Cap 90 Capsule 1     Sig: Take 1 Capsule by mouth 2 times a day.   Chelita Shabazz M.D.

## 2022-03-30 NOTE — TELEPHONE ENCOUNTER
From: Arcenio Barnett  To: Physician Mirna Charlton  Sent: 3/30/2022 11:33 AM PDT  Subject: prescription    Hi there, Happy National Doctor Day!!!! I wonder if you can re-prescribe Gabapentin for my legs and knees or something else. Thank you, have a super Day

## 2022-04-04 ENCOUNTER — OFFICE VISIT (OUTPATIENT)
Dept: MEDICAL GROUP | Facility: PHYSICIAN GROUP | Age: 82
End: 2022-04-04
Payer: MEDICARE

## 2022-04-04 ENCOUNTER — APPOINTMENT (OUTPATIENT)
Dept: MEDICAL GROUP | Facility: PHYSICIAN GROUP | Age: 82
End: 2022-04-04
Payer: MEDICARE

## 2022-04-04 VITALS
BODY MASS INDEX: 23.18 KG/M2 | HEART RATE: 50 BPM | WEIGHT: 144.2 LBS | HEIGHT: 66 IN | TEMPERATURE: 97.3 F | DIASTOLIC BLOOD PRESSURE: 70 MMHG | RESPIRATION RATE: 12 BRPM | SYSTOLIC BLOOD PRESSURE: 134 MMHG | OXYGEN SATURATION: 95 %

## 2022-04-04 DIAGNOSIS — E55.9 VITAMIN D DEFICIENCY: ICD-10-CM

## 2022-04-04 DIAGNOSIS — M54.32 LEFT SCIATIC NERVE PAIN: ICD-10-CM

## 2022-04-04 DIAGNOSIS — R73.03 PREDIABETES: ICD-10-CM

## 2022-04-04 DIAGNOSIS — E78.5 DYSLIPIDEMIA: ICD-10-CM

## 2022-04-04 DIAGNOSIS — M85.852 OSTEOPENIA OF LEFT HIP: ICD-10-CM

## 2022-04-04 DIAGNOSIS — L98.9 SKIN LESION OF SCALP: ICD-10-CM

## 2022-04-04 DIAGNOSIS — G31.9 CEREBRAL ATROPHY, MILD (HCC): ICD-10-CM

## 2022-04-04 DIAGNOSIS — F32.1 CURRENT MODERATE EPISODE OF MAJOR DEPRESSIVE DISORDER WITHOUT PRIOR EPISODE (HCC): ICD-10-CM

## 2022-04-04 PROCEDURE — 99214 OFFICE O/P EST MOD 30 MIN: CPT | Performed by: INTERNAL MEDICINE

## 2022-04-04 RX ORDER — VITAMIN B COMPLEX
2000 TABLET ORAL DAILY
COMMUNITY
End: 2023-07-31

## 2022-04-04 ASSESSMENT — FIBROSIS 4 INDEX: FIB4 SCORE: 1.97

## 2022-04-04 ASSESSMENT — PATIENT HEALTH QUESTIONNAIRE - PHQ9
SUM OF ALL RESPONSES TO PHQ QUESTIONS 1-9: 9
5. POOR APPETITE OR OVEREATING: 0 - NOT AT ALL
CLINICAL INTERPRETATION OF PHQ2 SCORE: 4

## 2022-04-04 NOTE — ASSESSMENT & PLAN NOTE
Chronic and ongoing problem, will recheck A1c, no indication to start pharmacotherapy at this time.

## 2022-04-04 NOTE — ASSESSMENT & PLAN NOTE
New and decompensated problem.  Will discuss with patient if he would like to try any pharmacotherapy for his mood to help lift his spirits while he is going through all the recent medical changes and complications.

## 2022-04-04 NOTE — ASSESSMENT & PLAN NOTE
New problem, my colleague called in gabapentin for him when I was at the office last week and reports this has been helpful for her pain.  May need to consider epidural if it does not improve in the coming weeks.  Could be related to patient positioning during 2 recent surgeries.

## 2022-04-04 NOTE — PROGRESS NOTES
Subjective:   Chief Complaint/History of Present Illness:  Arcenio Barnett is a 82 y.o. male established patient who presents today to discuss medical problems as listed below. Arcenio is accompanied by his wife, Linh.    Problem   Osteopenia of Left Hip    Bone Density (3/2022):   lumbar spine T score of 2.3  proximal left femur T score of -1.5      IMPRESSION: According to the World Health Organization classification, bone mineral density of this patient is osteopenic for the proximal left femur and normal for the lumbar spine.     10-year Probability of Fracture:  Major Osteoporotic 11.8%  Hip     4.4%         New problem, DEXA checked after GLF led to left humerus fracture in early 2022. He was not taking calcium or vitamin D. No prior use of antiresorptive therapy. He is s/p ORIF with orthopedic surgery.     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  2 - more than half the days   Feeling down, depressed , or hopeless? 2 - more than half the days   Trouble falling or staying asleep, or sleeping too much?  2 - more than half the days   Feeling tired or having little energy?  1 - several days   Poor appetite or overeating?  0 - not at all   Feeling bad about yourself - or that you are a failure or have let yourself or your family down? 0 - not at all   Trouble concentrating on things, such as reading the newspaper or watching television? 2 - more than half the days   Moving or speaking so slowly that other people could have noticed.  Or the opposite - being so fidgety or restless that you have been moving around a lot more than usual?  0 - not at all   Thoughts that you would be better off dead, or of hurting yourself?  0 - not at all   Patient Health Questionnaire Score: 9     He has gone through several medical challenges recently including prostate surgery and then a ground-level fall  which led to a humerus fracture and required operative repair.  He has had more disrupted sleep and is trying to work through pain with PT but feels frustrated that he is not improving as quickly as he would like.     Cerebral Atrophy, Mild (Hcc)    CT head (2/2022): mild cerebral volume loss    Incidental finding on CT head imaging completed after a ground level fall.  No clinical concerns at this time for cognitive impairment.     Left Sciatic Nerve Pain    He reports recent worsening of left sciatic nerve pain.  That pain can start in the left buttock and then radiates down the anterolateral aspect of the left lower extremity.  It can be very aggravating and interrupt his sleep.  He messaged him about this concern last week and asked to go back on gabapentin which has been helpful to take the edge off.     Skin Lesion of Scalp    He has a raised scab on the top of his head, he is unsure how long it has been there. He denies any personal history of skin cancer, has never met with a dermatologist. Not diligent about wearing a hat outside of the house. No bleeding or pain with the scab on his head. He does occasional injure his scalp going in/out of their shed.  He is scheduled to see dermatology early April 2022.     Prediabetes       Ref. Range 9/30/2021 09:53   Glycohemoglobin Latest Ref Range: 4.0 - 5.6 % 5.8 (H)   Estim. Avg Glu Latest Units: mg/dL 120     He had mild glucose intolerance on previous evaluations.  No use of pharmacotherapy to lower blood sugar in the past.  His A1c is 5.8, no previous to compare with.  One of his children has type 2 diabetes requiring insulin.     Vitamin D Deficiency       Ref. Range 5/3/2016 2/19/2020   25-Hydroxy   Vitamin D 25 Latest Ref Range: 30 - 100 ng/mL 29 (L) 33     He has a past history of vitamin D deficiency.  He is not taking supplementation at this time.  He has osteopenia on DEXA and left humerus fracture after fall in early 2022.  He has chronic kidney disease,  "no known parathyroid disease.     Dyslipidemia       Ref. Range 2/19/2020 12:54 9/30/2021 09:53   Cholesterol,Tot Latest Ref Range: 100 - 199 mg/dL 207 (H) 216 (H)   Triglycerides Latest Ref Range: 0 - 149 mg/dL 91 74   HDL Latest Ref Range: >=40 mg/dL 54 56   LDL Latest Ref Range: <100 mg/dL 135 (H) 145 (H)       Patient has a history of hyperlipidemia and associated coronary artery disease.  He was previously on statin therapy however this was discontinued due to myalgias and arthralgias.  Cardiology had mentioned in a previous note initiated him on PSCK9 inhibitor therapy, but does not look like this has happened yet. He is on a baby aspirin daily.            Current Medications:  Current Outpatient Medications Ordered in Epic   Medication Sig Dispense Refill   • vitamin D3 (CHOLECALCIFEROL) 1000 Unit (25 mcg) Tab Take 2,000 Units by mouth every day.     • Calcium Carbonate 1500 (600 Ca) MG Tab Take 1 Tablet by mouth every day.     • gabapentin (NEURONTIN) 100 MG Cap Take 1 Capsule by mouth 2 times a day. 90 Capsule 1   • Ibuprofen 200 MG Cap Take  by mouth as needed.     • aspirin (ASA) 81 MG Chew Tab chewable tablet Chew 81 mg every day.     • carvedilol (COREG) 12.5 MG Tab TAKE 1 TABLET BY MOUTH TWICE DAILY WITH MEALS (Patient taking differently: Take 12.5 mg by mouth 2 times a day.) 200 Tablet 3     No current Epic-ordered facility-administered medications on file.          Objective:   Physical Exam:    Vitals: /70 (BP Location: Left arm, Patient Position: Sitting, BP Cuff Size: Adult)   Pulse (!) 50   Temp 36.3 °C (97.3 °F) (Temporal)   Resp 12   Ht 1.676 m (5' 6\")   Wt 65.4 kg (144 lb 3.2 oz)   SpO2 95%    BMI: Body mass index is 23.27 kg/m².  Physical Exam  Constitutional:       General: He is not in acute distress.     Appearance: Normal appearance. He is not ill-appearing.   HENT:      Right Ear: There is no impacted cerumen.      Left Ear: There is no impacted cerumen.      Ears:      " Comments: Hard of hearing  Eyes:      Conjunctiva/sclera: Conjunctivae normal.   Cardiovascular:      Rate and Rhythm: Regular rhythm. Bradycardia present.   Pulmonary:      Effort: Pulmonary effort is normal. No respiratory distress.      Breath sounds: Normal breath sounds. No wheezing or rhonchi.   Musculoskeletal:      Right lower leg: No edema.      Left lower leg: No edema.      Comments: Decreased ROM of left shoulder   Psychiatric:         Behavior: Behavior normal.         Thought Content: Thought content normal.         Judgment: Judgment normal.      Comments: Increased depression         Assessment and Plan:   Arcenio is a 82 y.o. male with the following:  Problem List Items Addressed This Visit     Dyslipidemia     Chronic and ongoing problem, was recommended to go on Repatha by cardiology, I had messaged them to follow up on the status of this. Patient intolerant to statins, he continues on baby aspirin.         Relevant Orders    CBC WITH DIFFERENTIAL    Comp Metabolic Panel    Lipid Profile    Vitamin D deficiency     Chronic and ongoing problem, recheck levels, encouraged him to start on 2,000 IU daily.         Prediabetes     Chronic and ongoing problem, will recheck A1c, no indication to start pharmacotherapy at this time.         Relevant Orders    HEMOGLOBIN A1C    VITAMIN B12    Skin lesion of scalp     Relatively new problem.  He has an appointment with dermatology later this week.  No significant change to scalp lesion.         Osteopenia of left hip     New problem.  DEXA checked after ground-level fall led to left humerus fracture in early 2022.  He has osteopenia in the left hip, elevated FRAX due to recent fracture.  Recommend he start vitamin D 2000 international units daily and calcium.  He tends towards constipation and reports good amount of calcium in his diet.  Asked him to check a much calcium he is getting in regularly and start with 600 mg daily of supplementation.  He had recent  prostate surgery and arm surgery so we will hold off on initiation antiresorptive therapy until our follow-up in 6 months once his vitamin levels are better repleted.  He agrees.         Current moderate episode of major depressive disorder without prior episode (HCC)     New and decompensated problem.  Will discuss with patient if he would like to try any pharmacotherapy for his mood to help lift his spirits while he is going through all the recent medical changes and complications.         Cerebral atrophy, mild (HCC)     New problem, incidental finding on imaging, observation at this time.         Left sciatic nerve pain     New problem, my colleague called in gabapentin for him when I was at the office last week and reports this has been helpful for her pain.  May need to consider epidural if it does not improve in the coming weeks.  Could be related to patient positioning during 2 recent surgeries.                RTC: Return in about 6 months (around 10/4/2022).    I spent a total of 32 minutes with record review, exam, communication with the patient, communication with other providers, and documentation of this encounter.    PLEASE NOTE: This dictation was created using voice recognition software. I have made every reasonable attempt to correct obvious errors, but I expect that there are errors of grammar and possibly content that I did not discover before finalizing the note.      Mirna Charlton, DO  Geriatric and Internal Medicine  [facility]

## 2022-04-04 NOTE — LETTER
Platter  Mirna Charlton D.O.  [address]  [place] [address]  Fax: [phone]   Authorization for Release/Disclosure of   Protected Health Information   Name: SARAH BETH ROCA : 1940 SSN: [id number]   Address: [address] [place] [address] [address]  [place] [address] Phone:    [phone] (home)    I authorize the entity listed below to release/disclose the PHI below to:   Platter/Mirna Charlton D.O. and Mirna Charlton D.O.   Provider or Entity Name:  JAVAD Liz and JASON Nolasco   Address   City, [facility], [facility]   Phone:      Fax:     Reason for request: continuity of care   Information to be released:    [  ] LAST COLONOSCOPY,  including any PATH REPORT and follow-up  [  ] LAST FIT/COLOGUARD RESULT [  ] LAST DEXA  [  ] LAST MAMMOGRAM  [  ] LAST PAP  [  ] LAST LABS [  ] RETINA EXAM REPORT  [  ] IMMUNIZATION RECORDS  [ x ] Release all info      [ x ] Check here and initial the line next to each item to release ALL health information INCLUDING  _____ Care and treatment for drug and / or alcohol abuse  _____ HIV testing, infection status, or AIDS  _____ Genetic Testing    DATES OF SERVICE OR TIME PERIOD TO BE DISCLOSED: __-___________  I understand and acknowledge that:  * This Authorization may be revoked at any time by you in writing, except if your health information has already been used or disclosed.  * Your health information that will be used or disclosed as a result of you signing this authorization could be re-disclosed by the recipient. If this occurs, your re-disclosed health information may no longer be protected by State or Federal laws.  * You may refuse to sign this Authorization. Your refusal will not affect your ability to obtain treatment.  * This Authorization becomes effective upon signing and will  on (date) __________.      If no date is indicated, this Authorization will  one (1) year from the signature date.    Name: Sarah Beth  Anibal    Signature:   Date:     4/4/2022       PLEASE FAX REQUESTED RECORDS BACK TO: [phone]

## 2022-04-04 NOTE — ASSESSMENT & PLAN NOTE
New problem.  DEXA checked after ground-level fall led to left humerus fracture in early 2022.  He has osteopenia in the left hip, elevated FRAX due to recent fracture.  Recommend he start vitamin D 2000 international units daily and calcium.  He tends towards constipation and reports good amount of calcium in his diet.  Asked him to check a much calcium he is getting in regularly and start with 600 mg daily of supplementation.  He had recent prostate surgery and arm surgery so we will hold off on initiation antiresorptive therapy until our follow-up in 6 months once his vitamin levels are better repleted.  He agrees.

## 2022-04-04 NOTE — ASSESSMENT & PLAN NOTE
Relatively new problem.  He has an appointment with dermatology later this week.  No significant change to scalp lesion.

## 2022-04-04 NOTE — ASSESSMENT & PLAN NOTE
Chronic and ongoing problem, was recommended to go on Repatha by cardiology, I had messaged them to follow up on the status of this. Patient intolerant to statins, he continues on baby aspirin.

## 2022-04-06 ENCOUNTER — HOSPITAL ENCOUNTER (OUTPATIENT)
Facility: MEDICAL CENTER | Age: 82
End: 2022-04-06
Attending: INTERNAL MEDICINE
Payer: MEDICARE

## 2022-04-06 ENCOUNTER — NON-PROVIDER VISIT (OUTPATIENT)
Dept: MEDICAL GROUP | Facility: PHYSICIAN GROUP | Age: 82
End: 2022-04-06
Payer: MEDICARE

## 2022-04-06 DIAGNOSIS — E78.5 DYSLIPIDEMIA: ICD-10-CM

## 2022-04-06 DIAGNOSIS — R73.03 PREDIABETES: ICD-10-CM

## 2022-04-06 LAB
ALBUMIN SERPL BCP-MCNC: 4.1 G/DL (ref 3.2–4.9)
ALBUMIN/GLOB SERPL: 1.6 G/DL
ALP SERPL-CCNC: 106 U/L (ref 30–99)
ALT SERPL-CCNC: 7 U/L (ref 2–50)
ANION GAP SERPL CALC-SCNC: 8 MMOL/L (ref 7–16)
AST SERPL-CCNC: 17 U/L (ref 12–45)
BASOPHILS # BLD AUTO: 1 % (ref 0–1.8)
BASOPHILS # BLD: 0.05 K/UL (ref 0–0.12)
BILIRUB SERPL-MCNC: 0.6 MG/DL (ref 0.1–1.5)
BUN SERPL-MCNC: 21 MG/DL (ref 8–22)
CALCIUM SERPL-MCNC: 9.2 MG/DL (ref 8.5–10.5)
CHLORIDE SERPL-SCNC: 107 MMOL/L (ref 96–112)
CHOLEST SERPL-MCNC: 215 MG/DL (ref 100–199)
CO2 SERPL-SCNC: 25 MMOL/L (ref 20–33)
CREAT SERPL-MCNC: 1.16 MG/DL (ref 0.5–1.4)
EOSINOPHIL # BLD AUTO: 0.32 K/UL (ref 0–0.51)
EOSINOPHIL NFR BLD: 6.3 % (ref 0–6.9)
ERYTHROCYTE [DISTWIDTH] IN BLOOD BY AUTOMATED COUNT: 51.7 FL (ref 35.9–50)
EST. AVERAGE GLUCOSE BLD GHB EST-MCNC: 100 MG/DL
GFR SERPLBLD CREATININE-BSD FMLA CKD-EPI: 63 ML/MIN/1.73 M 2
GLOBULIN SER CALC-MCNC: 2.5 G/DL (ref 1.9–3.5)
GLUCOSE SERPL-MCNC: 98 MG/DL (ref 65–99)
HBA1C MFR BLD: 5.1 % (ref 4–5.6)
HCT VFR BLD AUTO: 40.6 % (ref 42–52)
HDLC SERPL-MCNC: 63 MG/DL
HGB BLD-MCNC: 12.9 G/DL (ref 14–18)
IMM GRANULOCYTES # BLD AUTO: 0.01 K/UL (ref 0–0.11)
IMM GRANULOCYTES NFR BLD AUTO: 0.2 % (ref 0–0.9)
LDLC SERPL CALC-MCNC: 139 MG/DL
LYMPHOCYTES # BLD AUTO: 1.2 K/UL (ref 1–4.8)
LYMPHOCYTES NFR BLD: 23.8 % (ref 22–41)
MCH RBC QN AUTO: 30.9 PG (ref 27–33)
MCHC RBC AUTO-ENTMCNC: 31.8 G/DL (ref 33.7–35.3)
MCV RBC AUTO: 97.4 FL (ref 81.4–97.8)
MONOCYTES # BLD AUTO: 0.51 K/UL (ref 0–0.85)
MONOCYTES NFR BLD AUTO: 10.1 % (ref 0–13.4)
NEUTROPHILS # BLD AUTO: 2.96 K/UL (ref 1.82–7.42)
NEUTROPHILS NFR BLD: 58.6 % (ref 44–72)
NRBC # BLD AUTO: 0 K/UL
NRBC BLD-RTO: 0 /100 WBC
PLATELET # BLD AUTO: 154 K/UL (ref 164–446)
PMV BLD AUTO: 11.4 FL (ref 9–12.9)
POTASSIUM SERPL-SCNC: 4.5 MMOL/L (ref 3.6–5.5)
PROT SERPL-MCNC: 6.6 G/DL (ref 6–8.2)
RBC # BLD AUTO: 4.17 M/UL (ref 4.7–6.1)
SODIUM SERPL-SCNC: 140 MMOL/L (ref 135–145)
TRIGL SERPL-MCNC: 67 MG/DL (ref 0–149)
VIT B12 SERPL-MCNC: 364 PG/ML (ref 211–911)
WBC # BLD AUTO: 5.1 K/UL (ref 4.8–10.8)

## 2022-04-06 PROCEDURE — 36415 COLL VENOUS BLD VENIPUNCTURE: CPT | Performed by: INTERNAL MEDICINE

## 2022-04-06 PROCEDURE — 80061 LIPID PANEL: CPT

## 2022-04-06 PROCEDURE — 82607 VITAMIN B-12: CPT

## 2022-04-06 PROCEDURE — 83036 HEMOGLOBIN GLYCOSYLATED A1C: CPT

## 2022-04-06 PROCEDURE — 85025 COMPLETE CBC W/AUTO DIFF WBC: CPT

## 2022-04-06 PROCEDURE — 80053 COMPREHEN METABOLIC PANEL: CPT

## 2022-04-06 PROCEDURE — 99000 SPECIMEN HANDLING OFFICE-LAB: CPT | Performed by: INTERNAL MEDICINE

## 2022-04-07 ENCOUNTER — HOSPITAL ENCOUNTER (OUTPATIENT)
Facility: MEDICAL CENTER | Age: 82
End: 2022-04-07
Attending: PHYSICIAN ASSISTANT
Payer: MEDICARE

## 2022-04-07 PROCEDURE — 87086 URINE CULTURE/COLONY COUNT: CPT

## 2022-04-09 LAB
BACTERIA UR CULT: NORMAL
SIGNIFICANT IND 70042: NORMAL
SITE SITE: NORMAL
SOURCE SOURCE: NORMAL

## 2022-07-18 ENCOUNTER — TELEPHONE (OUTPATIENT)
Dept: HEALTH INFORMATION MANAGEMENT | Facility: OTHER | Age: 82
End: 2022-07-18
Payer: MEDICARE

## 2022-08-01 RX ORDER — GABAPENTIN 100 MG/1
CAPSULE ORAL
Qty: 200 CAPSULE | Refills: 0 | Status: SHIPPED | OUTPATIENT
Start: 2022-08-01 | End: 2023-08-07

## 2022-10-10 ENCOUNTER — OFFICE VISIT (OUTPATIENT)
Dept: MEDICAL GROUP | Facility: PHYSICIAN GROUP | Age: 82
End: 2022-10-10
Payer: MEDICARE

## 2022-10-10 VITALS
TEMPERATURE: 97.1 F | HEIGHT: 66 IN | OXYGEN SATURATION: 96 % | DIASTOLIC BLOOD PRESSURE: 60 MMHG | WEIGHT: 142.5 LBS | RESPIRATION RATE: 12 BRPM | BODY MASS INDEX: 22.9 KG/M2 | HEART RATE: 48 BPM | SYSTOLIC BLOOD PRESSURE: 102 MMHG

## 2022-10-10 DIAGNOSIS — E78.5 DYSLIPIDEMIA: ICD-10-CM

## 2022-10-10 DIAGNOSIS — R97.20 ELEVATED PSA: ICD-10-CM

## 2022-10-10 DIAGNOSIS — Z23 NEED FOR VACCINATION: ICD-10-CM

## 2022-10-10 DIAGNOSIS — M17.0 BILATERAL PRIMARY OSTEOARTHRITIS OF KNEE: ICD-10-CM

## 2022-10-10 DIAGNOSIS — B35.3 TINEA PEDIS, LEFT: ICD-10-CM

## 2022-10-10 PROCEDURE — G0008 ADMIN INFLUENZA VIRUS VAC: HCPCS | Performed by: INTERNAL MEDICINE

## 2022-10-10 PROCEDURE — 99214 OFFICE O/P EST MOD 30 MIN: CPT | Mod: 25 | Performed by: INTERNAL MEDICINE

## 2022-10-10 PROCEDURE — 90662 IIV NO PRSV INCREASED AG IM: CPT | Performed by: INTERNAL MEDICINE

## 2022-10-10 RX ORDER — CLOTRIMAZOLE 1 %
1 CREAM (GRAM) TOPICAL 2 TIMES DAILY
Qty: 28 G | Refills: 1 | Status: SHIPPED | OUTPATIENT
Start: 2022-10-10 | End: 2023-08-07

## 2022-10-10 ASSESSMENT — FIBROSIS 4 INDEX: FIB4 SCORE: 3.42

## 2022-10-10 NOTE — ASSESSMENT & PLAN NOTE
New and decompensated problem, history of the same in the past approximately 3 years ago.  Recommend he apply clotrimazole to the affected area on the top of his left foot.  He should message me if it is not improving quickly or if it is worsening significantly.

## 2022-10-10 NOTE — PROGRESS NOTES
Subjective:   Chief Complaint/History of Present Illness:  Arcenio Barnett is a 82 y.o. male established patient who presents today to discuss medical problems as listed below. Arcenio is accompanied by his wife, Linh.    Problem   Elevated Psa    He has history of elevated PSA and follows with urology.  He was told that he needs to get his prostate level checked again before follow-up in November, he does not think he was given a lab order for this.  We will likely give him the lab order so he can complete it with my lab work and an echo for the results to Dr. Liz's office at [facility].     Bilateral Primary Osteoarthritis of Knee    Right Knee Xray (10/2014):  Bone density is normal.  There is no evidence of fracture or dislocation.  Moderate joint space narrowing periventricular sclerosis and marginal spurring is identified in the right knee joint is most prominent in the medial compartment and patellofemoral joint.  There is no joint effusion.    He had moderate osteoarthritis noted of the right knee in 2014.  He reports around that time he was referred to orthopedic surgery to discuss total knee arthroplasty.  He reports that it was too cost prohibitive and so he is not able to pursue surgical correction.  He reports receiving a pain medication at that time, Norco 5-325 mg, which he took once daily and provided significant relief of the knees.  He never came back to have it refilled with his previous primary care provider.  In the meantime he has been taking Advil over-the-counter 200 mg daily.  He does have a history of chronic kidney disease, GFR is stable.    He saw Dr. Fermin with orthopedics and had about 2 weeks of improvement with steroid injection and no improvement and actually worsening of symptoms with trial of Synvisc injections.  He continues to use anti-inflammatories as Tylenol is not effective. No significant improvement with gabapentin.    Current regimen: Anti-inflammatories  as needed, initiate gabapentin 100 mg twice daily (titrate up as needed, he will send an update in mid October 2021)     Tinea Pedis, Left    He has area on the top aspect of the left foot between the first and second digits that is erythematous and dry, appears consistent with a fungus infection.  Patient reports history of athlete's foot in the past which responded to topical therapy.     Dyslipidemia     Latest Reference Range & Units 4/6/22 10:10   Cholesterol,Tot 100 - 199 mg/dL 215 (H)   Triglycerides 0 - 149 mg/dL 67   HDL >=40 mg/dL 63   LDL <100 mg/dL 139 (H)     Patient has a history of hyperlipidemia and associated coronary artery disease.  He was previously on statin therapy however this was discontinued due to myalgias and arthralgias.  Cardiology had mentioned in a previous note initiated him on PSCK9 inhibitor therapy, but does not look like this has happened yet. He is on a baby aspirin daily. We did complete paperwork for Repatha but he elected to stop taking it.            Current Medications:  Current Outpatient Medications Ordered in Epic   Medication Sig Dispense Refill    clotrimazole (LOTRIMIN) 1 % Cream Apply 1 Application topically 2 times a day. Left foot 28 g 1    gabapentin (NEURONTIN) 100 MG Cap Take 1 capsule by mouth twice daily 200 Capsule 0    vitamin D3 (CHOLECALCIFEROL) 1000 Unit (25 mcg) Tab Take 2,000 Units by mouth every day.      Calcium Carbonate 1500 (600 Ca) MG Tab Take 1 Tablet by mouth every day.      Ibuprofen 200 MG Cap Take  by mouth as needed.      aspirin (ASA) 81 MG Chew Tab chewable tablet Chew 81 mg every day.      carvedilol (COREG) 12.5 MG Tab TAKE 1 TABLET BY MOUTH TWICE DAILY WITH MEALS (Patient taking differently: Take 12.5 mg by mouth 2 times a day.) 200 Tablet 3     No current Epic-ordered facility-administered medications on file.          Objective:   Physical Exam:    Vitals: /60 (BP Location: Right arm, Patient Position: Sitting, BP Cuff Size:  "Adult)   Pulse (!) 48   Temp 36.2 °C (97.1 °F) (Temporal)   Resp 12   Ht 1.676 m (5' 6\")   Wt 64.6 kg (142 lb 8 oz)   SpO2 96%    BMI: Body mass index is 23 kg/m².  Physical Exam  Constitutional:       General: He is not in acute distress.     Appearance: Normal appearance.   HENT:      Right Ear: There is no impacted cerumen.      Left Ear: There is no impacted cerumen.   Eyes:      Conjunctiva/sclera: Conjunctivae normal.   Cardiovascular:      Rate and Rhythm: Normal rate and regular rhythm.      Pulses: Normal pulses.   Pulmonary:      Effort: Pulmonary effort is normal. No respiratory distress.      Breath sounds: No wheezing.   Musculoskeletal:         General: Deformity present.      Comments: Palpable deformity left deltoid from prior surgical instrumentation   Skin:     General: Skin is warm.      Findings: Rash present.      Comments: Erythematous dry well demarcated rash on top of left foot between 1st and 2nd digits   Neurological:      Gait: Gait abnormal.      Comments: antalgic   Psychiatric:         Mood and Affect: Mood normal.         Behavior: Behavior normal.         Thought Content: Thought content normal.         Judgment: Judgment normal.             Assessment and Plan:   Arcenio is a 82 y.o. male with the following:  Problem List Items Addressed This Visit       Dyslipidemia     Chronic and ongoing problem, does not tolerate statin, declines use of PCSK9 inhibitor therapy. Would benefit from lipid lowering therapy. Update lab work.          Relevant Orders    CBC WITH DIFFERENTIAL    Comp Metabolic Panel    Lipid Profile    VITAMIN B12    VITAMIN D,25 HYDROXY (DEFICIENCY)    Bilateral primary osteoarthritis of knee     Chronic and decompensated issue.  Continues to be limited by knee pain.  He would be interested in following up with orthopedics, he previously saw [facility] with [facility].  Will place referral.  Okay to continue ibuprofen and gabapentin as needed.  He is " most interested and repeat steroid injection, he had worsening of symptoms with Synvisc injection in the past.  Could also consider genicular neurotomy as an alternative to surgery.         Relevant Orders    Referral to Orthopedics    Tinea pedis, left     New and decompensated problem, history of the same in the past approximately 3 years ago.  Recommend he apply clotrimazole to the affected area on the top of his left foot.  He should message me if it is not improving quickly or if it is worsening significantly.         Relevant Medications    clotrimazole (LOTRIMIN) 1 % Cream    Elevated PSA     Chronic and ongoing problem.  Was told to get repeat PSA lab work done before 6-month follow-up was in November 2022, he was not given his order, will place order and fax results to his urologist office.         Relevant Orders    PSA TOTAL + %FREE     Other Visit Diagnoses       Need for vaccination        Relevant Orders    Influenza Vaccine, High Dose (65+ Only) (Completed)             RTC: Return in about 6 months (around 4/10/2023).    I spent a total of 32 minutes with record review, exam, communication with the patient, communication with other providers, and documentation of this encounter.    PLEASE NOTE: This dictation was created using voice recognition software. I have made every reasonable attempt to correct obvious errors, but I expect that there are errors of grammar and possibly content that I did not discover before finalizing the note.      Mirna Charlton, DO  Geriatric and Internal Medicine  [facility]

## 2022-10-10 NOTE — ASSESSMENT & PLAN NOTE
Chronic and ongoing problem.  Was told to get repeat PSA lab work done before 6-month follow-up was in November 2022, he was not given his order, will place order and fax results to his urologist office.

## 2022-10-10 NOTE — ASSESSMENT & PLAN NOTE
Chronic and ongoing problem, does not tolerate statin, declines use of PCSK9 inhibitor therapy. Would benefit from lipid lowering therapy. Update lab work.

## 2022-10-10 NOTE — ASSESSMENT & PLAN NOTE
Chronic and decompensated issue.  Continues to be limited by knee pain.  He would be interested in following up with orthopedics, he previously saw [facility] to establish with [facility].  Will place referral.  Okay to continue ibuprofen and gabapentin as needed.  He is most interested and repeat steroid injection, he had worsening of symptoms with Synvisc injection in the past.  Could also consider genicular neurotomy as an alternative to surgery.

## 2023-01-12 ENCOUNTER — OFFICE VISIT (OUTPATIENT)
Dept: CARDIOLOGY | Facility: MEDICAL CENTER | Age: 83
End: 2023-01-12
Payer: MEDICARE

## 2023-01-12 VITALS
SYSTOLIC BLOOD PRESSURE: 120 MMHG | HEIGHT: 66 IN | OXYGEN SATURATION: 95 % | RESPIRATION RATE: 16 BRPM | DIASTOLIC BLOOD PRESSURE: 60 MMHG | WEIGHT: 150 LBS | BODY MASS INDEX: 24.11 KG/M2 | HEART RATE: 50 BPM

## 2023-01-12 DIAGNOSIS — I35.1 NONRHEUMATIC AORTIC VALVE INSUFFICIENCY: ICD-10-CM

## 2023-01-12 DIAGNOSIS — Z78.9 STATIN INTOLERANCE: ICD-10-CM

## 2023-01-12 DIAGNOSIS — I25.10 CORONARY ARTERY DISEASE INVOLVING NATIVE CORONARY ARTERY OF NATIVE HEART WITHOUT ANGINA PECTORIS: ICD-10-CM

## 2023-01-12 DIAGNOSIS — I10 ESSENTIAL HYPERTENSION: ICD-10-CM

## 2023-01-12 DIAGNOSIS — E78.2 MIXED HYPERLIPIDEMIA: ICD-10-CM

## 2023-01-12 PROCEDURE — 99214 OFFICE O/P EST MOD 30 MIN: CPT | Performed by: INTERNAL MEDICINE

## 2023-01-12 RX ORDER — EZETIMIBE 10 MG/1
10 TABLET ORAL DAILY
Qty: 90 TABLET | Refills: 3 | Status: SHIPPED | OUTPATIENT
Start: 2023-01-12 | End: 2023-04-10 | Stop reason: SDUPTHER

## 2023-01-12 ASSESSMENT — FIBROSIS 4 INDEX: FIB4 SCORE: 3.46

## 2023-01-12 NOTE — PROGRESS NOTES
Chief Complaint   Patient presents with    Hypertension     F/V DX: Essential hypertension       Subjective:   Arcenio Barnett is a 83 y.o. male who presents today for follow-up of coronary artery disease.  History of CAD status post PCI LAD and RCA 2007 with subsequent in-stent restenosis of the LAD 6/2016 with a new ABHISHEK placed. He's been maintained on aspirin and Brilinta now low-dose, beta blocker and Ace. There was confusion with his medical regimen and he is currently taking Toprol-XL and Coreg. Blood pressure is suboptimal. Has not had recent lab tests.     Since last visit he was lost to follow-up for 2-1/2 years and returns today off his statin and Zetia with atrocious lipid profile but no cardiac symptoms.    Past Medical History:   Diagnosis Date    Allergy     Arm fracture, left 02/22/2022    Arthritis 01/04/2022    RA hands/knees    BPH (benign prostatic hyperplasia)     CAD (coronary artery disease)     cardiac stents x 2 2000, 2007 [place], [place], [place] 2007 prox LAD    Cholelithiasis     CKD (chronic kidney disease) stage 3, GFR 30-59 ml/min ([facility] [place])     Per Problem List    Congestive heart failure (HCC) 2016    Fall 02/22/2022    Left Arm Fracture    Fracture     History of bladder infections     History of coronary artery stent placement 08/01/2014    Prox LAD, also RCA    Hyperlipidemia     Hypertension 01/04/2022    medicated    Myocardial infarct (HCC) 2007    2 stents placed    Prediabetes     Per Problem List    Statin intolerance 6/26/2018    Urinary bladder disorder 01/04/2022    vega in place 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    Urinary incontinence 01/04/2022    Vertigo      Past Surgical History:   Procedure Laterality Date    HUMERUS NAILING INTRAMEDULLARY N/A 3/5/2022    Procedure: LEFT HUMERAL INTRAMEDILLARY NAILING; LEFT ROTATOR CUFF REPAIR;  Surgeon: Vishal Louis M.D.;  Location: SURGERY [facility];  Service: Orthopedics    VA LASER VAPORIZATION SURGERY PROSTATE, COMP*  1/12/2022    Procedure:  ELECTROVAPORIZATION, PROSTATE, TRANSURETHRAL;  Surgeon: Jamaal Liz M.D.;  Location: SURGERY [facility];  Service: Urology    CHOLECYSTECTOMY  2014    OTHER      heart stent placement times 2    OTHER CARDIAC SURGERY      Stents x2     Family History   Problem Relation Age of Onset    Cancer Father 57        lung cancer    Cancer Brother 52        lung cancer    Cancer Sister         breast    Cancer Sister         breast    Cancer Sister         pancreatic    Hyperlipidemia Mother     Stroke Mother      Social History     Socioeconomic History    Marital status:      Spouse name: Not on file    Number of children: Not on file    Years of education: Not on file    Highest education level: Not on file   Occupational History    Not on file   Tobacco Use    Smoking status: Never    Smokeless tobacco: Never    Tobacco comments:     continued abstinence   Vaping Use    Vaping Use: Never used   Substance and Sexual Activity    Alcohol use: No     Alcohol/week: 0.0 oz    Drug use: Yes     Types: Marijuana, Inhaled     Comment: Occasional Marijuana use    Sexual activity: Never     Partners: Female   Other Topics Concern    Not on file   Social History Narrative    He is  to Linh. He relocated to [place] around 2015 from a small town in [place]. He is dissatisfied with living in [place]     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      Outpatient Encounter Medications as of 1/12/2023   Medication Sig Dispense Refill    ezetimibe (ZETIA) 10 MG Tab Take 1 Tablet by mouth every  "day. 90 Tablet 3    clotrimazole (LOTRIMIN) 1 % Cream Apply 1 Application topically 2 times a day. Left foot 28 g 1    vitamin D3 (CHOLECALCIFEROL) 1000 Unit (25 mcg) Tab Take 2,000 Units by mouth every day.      Calcium Carbonate 1500 (600 Ca) MG Tab Take 1 Tablet by mouth every day.      Ibuprofen 200 MG Cap Take  by mouth as needed.      aspirin (ASA) 81 MG Chew Tab chewable tablet Chew 81 mg every day.      carvedilol (COREG) 12.5 MG Tab TAKE 1 TABLET BY MOUTH TWICE DAILY WITH MEALS (Patient taking differently: Take 12.5 mg by mouth 2 times a day.) 200 Tablet 3    gabapentin (NEURONTIN) 100 MG Cap Take 1 capsule by mouth twice daily 200 Capsule 0     No facility-administered encounter medications on file as of 1/12/2023.     Review of Systems   All other systems reviewed and are negative.     Objective:   /60 (BP Location: Left arm, Patient Position: Sitting, BP Cuff Size: Adult)   Pulse (!) 50   Resp 16   Ht 1.676 m (5' 6\")   Wt 68 kg (150 lb)   SpO2 95%   BMI 24.21 kg/m²     Physical Exam  Vitals reviewed.   Constitutional:       General: He is not in acute distress.     Appearance: He is well-developed. He is not diaphoretic.   HENT:      Head: Normocephalic and atraumatic.      Right Ear: External ear normal.      Left Ear: External ear normal.   Eyes:      General: No scleral icterus.        Right eye: No discharge.         Left eye: No discharge.      Conjunctiva/sclera: Conjunctivae normal.      Pupils: Pupils are equal, round, and reactive to light.   Neck:      Thyroid: No thyromegaly.      Vascular: No JVD.      Trachea: No tracheal deviation.   Cardiovascular:      Rate and Rhythm: Normal rate and regular rhythm.      Chest Wall: PMI is not displaced.      Pulses:           Carotid pulses are 2+ on the right side and 2+ on the left side.       Radial pulses are 2+ on the left side.        Popliteal pulses are 2+ on the right side and 2+ on the left side.        Dorsalis pedis pulses are 2+ " on the right side and 2+ on the left side.        Posterior tibial pulses are 2+ on the right side and 2+ on the left side.      Heart sounds: Murmur ( 1/6 diastolic murmur) heard.     No friction rub. No gallop.   Pulmonary:      Effort: Pulmonary effort is normal. No respiratory distress.      Breath sounds: Normal breath sounds. No wheezing or rales.   Chest:      Chest wall: No tenderness.   Abdominal:      General: Bowel sounds are normal. There is no distension.      Palpations: Abdomen is soft.      Tenderness: There is no abdominal tenderness.   Musculoskeletal:         General: No tenderness or deformity. Normal range of motion.      Cervical back: Normal range of motion and neck supple.   Skin:     General: Skin is warm and dry.      Coloration: Skin is not pale.      Findings: No erythema or rash.   Neurological:      Mental Status: He is alert and oriented to person, place, and time.      Cranial Nerves: No cranial nerve deficit (cranial nerves II through XII grossly intact).      Coordination: Coordination normal.   Psychiatric:         Behavior: Behavior normal.         Thought Content: Thought content normal.     LABS:  Lab Results   Component Value Date/Time    CHOLSTRLTOT 215 (H) 04/06/2022 10:10 AM     (H) 04/06/2022 10:10 AM    HDL 63 04/06/2022 10:10 AM    TRIGLYCERIDE 67 04/06/2022 10:10 AM       Lab Results   Component Value Date/Time    WBC 5.1 04/06/2022 10:10 AM    RBC 4.17 (L) 04/06/2022 10:10 AM    HEMOGLOBIN 12.9 (L) 04/06/2022 10:10 AM    HEMATOCRIT 40.6 (L) 04/06/2022 10:10 AM    MCV 97.4 04/06/2022 10:10 AM    NEUTSPOLYS 58.60 04/06/2022 10:10 AM    LYMPHOCYTES 23.80 04/06/2022 10:10 AM    MONOCYTES 10.10 04/06/2022 10:10 AM    EOSINOPHILS 6.30 04/06/2022 10:10 AM    BASOPHILS 1.00 04/06/2022 10:10 AM     Lab Results   Component Value Date/Time    SODIUM 140 04/06/2022 10:10 AM    POTASSIUM 4.5 04/06/2022 10:10 AM    CHLORIDE 107 04/06/2022 10:10 AM    CO2 25 04/06/2022 10:10 AM     GLUCOSE 98 04/06/2022 10:10 AM    BUN 21 04/06/2022 10:10 AM    CREATININE 1.16 04/06/2022 10:10 AM     Lab Results   Component Value Date    HBA1C 5.1 04/06/2022      Lab Results   Component Value Date/Time    ALKPHOSPHAT 106 (H) 04/06/2022 10:10 AM    ASTSGOT 17 04/06/2022 10:10 AM    ALTSGPT 7 04/06/2022 10:10 AM    TBILIRUBIN 0.6 04/06/2022 10:10 AM      ECHO CONCLUSIONS (6/21/2018):  Prior echo 4/8/16  Normal left ventricular chamber size.  Left ventricular ejection fraction is visually estimated to be 60%.  Aortic sclerosis without stenosis.  Mild-moderate aortic insufficiency. -516 ms.  Compared to the report of the prior study done - there has been a   slight progression in aortic insufficiency.      Assessment:     1. Coronary artery disease involving native coronary artery of native heart without angina pectoris  EC-ECHOCARDIOGRAM COMPLETE W/O CONT    Referral to Lipid Clinic      2. Essential hypertension  EC-ECHOCARDIOGRAM COMPLETE W/O CONT      3. Nonrheumatic aortic valve insufficiency        4. Statin intolerance  Referral to Lipid Clinic      5. Mixed hyperlipidemia  ezetimibe (ZETIA) 10 MG Tab    Referral to Lipid Clinic          Medical Decision Making:  Today's Assessment / Status / Plan:     No cardiac symptoms.  Tried to use PCSK9 inhibitor in the past but was limited by bureaucratic issues it sounds like.  Was intolerant of statins and is quite interested in discussing retrial today.  Cannot recall why he stopped taking his Zetia, he just not taking it.  Recommend reinstitution of Zetia referral to lipid clinic for optimization of his lipid profile for goal LDL less than 70 as possible given his underlying coronary disease and statin intolerance/allergy.  Echocardiogram for hypertension and lost to follow-up for aortic insufficiency.  We will follow-up with cardiology yearly.

## 2023-01-17 ENCOUNTER — TELEPHONE (OUTPATIENT)
Dept: VASCULAR LAB | Facility: MEDICAL CENTER | Age: 83
End: 2023-01-17
Payer: MEDICARE

## 2023-01-18 NOTE — TELEPHONE ENCOUNTER
[facility]    Received Lipid clinic referral.    PCP: Dr Charlton  INS: [facility]  Preferred location: [facility] (because of apt availability)    LM for pt to call clinic and establish care.     Tan Merritt, PharmD

## 2023-01-23 ENCOUNTER — DOCUMENTATION (OUTPATIENT)
Dept: VASCULAR LAB | Facility: MEDICAL CENTER | Age: 83
End: 2023-01-23
Payer: MEDICARE

## 2023-01-23 NOTE — PROGRESS NOTES
[facility]      Called and left msg for pt to call back to establish care regarding pharmacotherapy referral for LIPIDS  from Dr. Lopez on 1/12/23.      First message     Insurance: [employer]  PCP: Vikki = Dr. Charlton  Locations to be seen: ANY     Phone number left for return call or any questions or concerns.  [facility] at [phone], fax [phone]      Rajinder SaenzD

## 2023-01-30 ENCOUNTER — TELEPHONE (OUTPATIENT)
Dept: VASCULAR LAB | Facility: MEDICAL CENTER | Age: 83
End: 2023-01-30
Payer: MEDICARE

## 2023-01-30 NOTE — LETTER
[address]  [place] [address]        Dear Arcenio Barnett ,    We have been unsuccessful in our attempts to contact you regarding your lipid Clinical Pharmacist referral.  Please contact us if you would like to schedule an appointment for help managing your cholesterol.    Please contact our clinic so we may assist you.  We are open Monday-Friday 8 am until 5 pm.  You may reach our Service at [phone].        Sincerely,    Tan Merritt, LettyD, [facility]  Clinic Supervisor  [facility]  Outpatient Anticoagulation Service

## 2023-01-30 NOTE — TELEPHONE ENCOUNTER
[facility]     Called and left msg for pt to call back to establish care regarding pharmacotherapy referral for LIPIDS  from Dr. Lopez on 1/12/23.      Called pt to schedule appt to establish care - no answer. LVM.    3rd call.    Sent letter.     Insurance: [facility]  PCP: [facility]  Locations to be seen: Any     Phone number left for return call or any questions or concerns.  [facility] at [phone], fax [phone]    Dwayne Milan, LettyD, BCACP

## 2023-02-06 ENCOUNTER — TELEPHONE (OUTPATIENT)
Dept: VASCULAR LAB | Facility: MEDICAL CENTER | Age: 83
End: 2023-02-06
Payer: MEDICARE

## 2023-02-06 NOTE — TELEPHONE ENCOUNTER
[facility]     Called and left msg for pt to call back to establish care regarding pharmacotherapy referral for LIPIDS  from Dr. Lopez on 1/12/23.      Called pt to schedule appt to establish care - no answer. LVM.    4th call     Insurance: [facility]  PCP: [facility]  Locations to be seen: Any     Phone number left for return call or any questions or concerns.  [facility] at [phone], fax [phone]     Renetta Alvarado, LettyD

## 2023-02-13 ENCOUNTER — TELEPHONE (OUTPATIENT)
Dept: VASCULAR LAB | Facility: MEDICAL CENTER | Age: 83
End: 2023-02-13
Payer: MEDICARE

## 2023-02-13 NOTE — TELEPHONE ENCOUNTER
[facility]     Called and left msg for pt to call back to establish care regarding pharmacotherapy referral for LIPIDS  from Dr. Lopez on 1/12/23.      Called pt to schedule appt to establish care - no answer. LVM.     5th call    Letter sent x 2.    Will await pt contact.     Insurance: [facility]  PCP: [facility]  Locations to be seen: Any     Phone number left for return call or any questions or concerns.  [facility] at [phone], fax [phone]    Dwayne Milan, PharmD, BCACP

## 2023-02-13 NOTE — LETTER
[address]  [place] [address]        Dear Arcenio Barnett ,    We have been unsuccessful in our attempts to contact you regarding your lipid Clinical Pharmacist referral.  Please contact us if you would like to schedule an appointment for help managing your cholesterol.    Please contact our clinic so we may assist you.  We are open Monday-Friday 8 am until 5 pm.  You may reach our Service at [phone].        Sincerely,    Tan Merritt, LettyD, [facility]  Clinic Supervisor  [facility]  Outpatient Anticoagulation Service

## 2023-04-05 ENCOUNTER — HOSPITAL ENCOUNTER (OUTPATIENT)
Dept: LAB | Facility: MEDICAL CENTER | Age: 83
End: 2023-04-05
Attending: INTERNAL MEDICINE
Payer: MEDICARE

## 2023-04-05 DIAGNOSIS — R97.20 ELEVATED PSA: ICD-10-CM

## 2023-04-05 DIAGNOSIS — E78.5 DYSLIPIDEMIA: ICD-10-CM

## 2023-04-05 LAB
25(OH)D3 SERPL-MCNC: 36 NG/ML (ref 30–100)
ALBUMIN SERPL BCP-MCNC: 4.2 G/DL (ref 3.2–4.9)
ALBUMIN/GLOB SERPL: 1.6 G/DL
ALP SERPL-CCNC: 60 U/L (ref 30–99)
ALT SERPL-CCNC: 16 U/L (ref 2–50)
ANION GAP SERPL CALC-SCNC: 10 MMOL/L (ref 7–16)
AST SERPL-CCNC: 23 U/L (ref 12–45)
BASOPHILS # BLD AUTO: 1 % (ref 0–1.8)
BASOPHILS # BLD: 0.06 K/UL (ref 0–0.12)
BILIRUB SERPL-MCNC: 0.6 MG/DL (ref 0.1–1.5)
BUN SERPL-MCNC: 25 MG/DL (ref 8–22)
CALCIUM ALBUM COR SERPL-MCNC: 9.1 MG/DL (ref 8.5–10.5)
CALCIUM SERPL-MCNC: 9.3 MG/DL (ref 8.5–10.5)
CHLORIDE SERPL-SCNC: 107 MMOL/L (ref 96–112)
CHOLEST SERPL-MCNC: 224 MG/DL (ref 100–199)
CO2 SERPL-SCNC: 26 MMOL/L (ref 20–33)
CREAT SERPL-MCNC: 1.17 MG/DL (ref 0.5–1.4)
EOSINOPHIL # BLD AUTO: 0.23 K/UL (ref 0–0.51)
EOSINOPHIL NFR BLD: 4 % (ref 0–6.9)
ERYTHROCYTE [DISTWIDTH] IN BLOOD BY AUTOMATED COUNT: 46.5 FL (ref 35.9–50)
FASTING STATUS PATIENT QL REPORTED: NORMAL
GFR SERPLBLD CREATININE-BSD FMLA CKD-EPI: 62 ML/MIN/1.73 M 2
GLOBULIN SER CALC-MCNC: 2.6 G/DL (ref 1.9–3.5)
GLUCOSE SERPL-MCNC: 106 MG/DL (ref 65–99)
HCT VFR BLD AUTO: 45.9 % (ref 42–52)
HDLC SERPL-MCNC: 66 MG/DL
HGB BLD-MCNC: 15 G/DL (ref 14–18)
IMM GRANULOCYTES # BLD AUTO: 0.02 K/UL (ref 0–0.11)
IMM GRANULOCYTES NFR BLD AUTO: 0.3 % (ref 0–0.9)
LDLC SERPL CALC-MCNC: 141 MG/DL
LYMPHOCYTES # BLD AUTO: 1.14 K/UL (ref 1–4.8)
LYMPHOCYTES NFR BLD: 19.9 % (ref 22–41)
MCH RBC QN AUTO: 31.3 PG (ref 27–33)
MCHC RBC AUTO-ENTMCNC: 32.7 G/DL (ref 33.7–35.3)
MCV RBC AUTO: 95.6 FL (ref 81.4–97.8)
MONOCYTES # BLD AUTO: 0.54 K/UL (ref 0–0.85)
MONOCYTES NFR BLD AUTO: 9.4 % (ref 0–13.4)
NEUTROPHILS # BLD AUTO: 3.75 K/UL (ref 1.82–7.42)
NEUTROPHILS NFR BLD: 65.4 % (ref 44–72)
NRBC # BLD AUTO: 0 K/UL
NRBC BLD-RTO: 0 /100 WBC
PLATELET # BLD AUTO: 178 K/UL (ref 164–446)
PMV BLD AUTO: 11.7 FL (ref 9–12.9)
POTASSIUM SERPL-SCNC: 5 MMOL/L (ref 3.6–5.5)
PROT SERPL-MCNC: 6.8 G/DL (ref 6–8.2)
RBC # BLD AUTO: 4.8 M/UL (ref 4.7–6.1)
SODIUM SERPL-SCNC: 143 MMOL/L (ref 135–145)
TRIGL SERPL-MCNC: 86 MG/DL (ref 0–149)
VIT B12 SERPL-MCNC: 763 PG/ML (ref 211–911)
WBC # BLD AUTO: 5.7 K/UL (ref 4.8–10.8)

## 2023-04-05 PROCEDURE — 82306 VITAMIN D 25 HYDROXY: CPT

## 2023-04-05 PROCEDURE — 80061 LIPID PANEL: CPT

## 2023-04-05 PROCEDURE — 85025 COMPLETE CBC W/AUTO DIFF WBC: CPT

## 2023-04-05 PROCEDURE — 36415 COLL VENOUS BLD VENIPUNCTURE: CPT

## 2023-04-05 PROCEDURE — 84153 ASSAY OF PSA TOTAL: CPT

## 2023-04-05 PROCEDURE — 82607 VITAMIN B-12: CPT

## 2023-04-05 PROCEDURE — 84154 ASSAY OF PSA FREE: CPT

## 2023-04-05 PROCEDURE — 80053 COMPREHEN METABOLIC PANEL: CPT

## 2023-04-08 LAB
PSA FREE MFR SERPL: 35 %
PSA FREE SERPL-MCNC: 0.8 NG/ML
PSA SERPL-MCNC: 2.3 NG/ML (ref 0–4)

## 2023-04-10 ENCOUNTER — OFFICE VISIT (OUTPATIENT)
Dept: MEDICAL GROUP | Facility: PHYSICIAN GROUP | Age: 83
End: 2023-04-10
Payer: MEDICARE

## 2023-04-10 VITALS
OXYGEN SATURATION: 96 % | BODY MASS INDEX: 22.66 KG/M2 | HEIGHT: 66 IN | DIASTOLIC BLOOD PRESSURE: 80 MMHG | SYSTOLIC BLOOD PRESSURE: 138 MMHG | WEIGHT: 141 LBS | HEART RATE: 52 BPM | RESPIRATION RATE: 16 BRPM | TEMPERATURE: 97.3 F

## 2023-04-10 DIAGNOSIS — M17.0 BILATERAL PRIMARY OSTEOARTHRITIS OF KNEE: ICD-10-CM

## 2023-04-10 DIAGNOSIS — G31.9 CEREBRAL ATROPHY, MILD (HCC): ICD-10-CM

## 2023-04-10 DIAGNOSIS — I10 ESSENTIAL HYPERTENSION: ICD-10-CM

## 2023-04-10 DIAGNOSIS — E78.5 DYSLIPIDEMIA: ICD-10-CM

## 2023-04-10 DIAGNOSIS — F32.0 CURRENT MILD EPISODE OF MAJOR DEPRESSIVE DISORDER WITHOUT PRIOR EPISODE (HCC): ICD-10-CM

## 2023-04-10 PROCEDURE — 99214 OFFICE O/P EST MOD 30 MIN: CPT | Performed by: INTERNAL MEDICINE

## 2023-04-10 RX ORDER — EZETIMIBE 10 MG/1
10 TABLET ORAL DAILY
Qty: 90 TABLET | Refills: 3 | Status: SHIPPED | OUTPATIENT
Start: 2023-04-10

## 2023-04-10 ASSESSMENT — FIBROSIS 4 INDEX: FIB4 SCORE: 2.68

## 2023-04-10 ASSESSMENT — PATIENT HEALTH QUESTIONNAIRE - PHQ9: CLINICAL INTERPRETATION OF PHQ2 SCORE: 0

## 2023-04-10 NOTE — ASSESSMENT & PLAN NOTE
Chronic decompensated problem, bilateral knee pain continues to limit his quality of life and activity levels. Encouraged him to highly consider knee replacement surgery and at the very least go back in for steroid injections or consideration for genicular neurotomy or peripheral nerve stimulator.

## 2023-04-10 NOTE — PROGRESS NOTES
Subjective:   Chief Complaint/History of Present Illness:  Arcenio Barnett is a 83 y.o. male established patient who presents today to discuss medical problems as listed below. Arcenio is accompanied by his partner, Linh.    Problem   Current Mild Episode of Major Depressive Disorder Without Prior Episode (Formerly [facility])    Depression Screening (4/2023)    Little interest or pleasure in doing things?  0 - not at all  Feeling down, depressed , or hopeless? 0 - not at all  Patient Health Questionnaire Score: 0      He has gone through several medical challenges recently including prostate surgery and then a ground-level fall which led to a humerus fracture and required operative repair.  He has had more disrupted sleep and is trying to work through pain with PT but feels frustrated that he is not improving as quickly as he would like. Now bogged down by chronic knee pain.     Cerebral Atrophy, Mild (Formerly [facility])    CT head (2/2022): mild cerebral volume loss    Incidental finding on CT head imaging completed after a ground level fall.  No clinical concerns at this time for cognitive impairment.     Bilateral Primary Osteoarthritis of Knee    Right Knee Xray (10/2014):  Bone density is normal.  There is no evidence of fracture or dislocation.  Moderate joint space narrowing periventricular sclerosis and marginal spurring is identified in the right knee joint is most prominent in the medial compartment and patellofemoral joint.  There is no joint effusion.    He had moderate osteoarthritis noted of the right knee in 2014.  He reports around that time he was referred to orthopedic surgery to discuss total knee arthroplasty.  He reports that it was too cost prohibitive and so he is not able to pursue surgical correction.  He reports receiving a pain medication at that time, Norco 5-325 mg, which he took once daily and provided significant relief of the knees.  He never came back to have it refilled with his previous primary care  provider.  In the meantime he has been taking Advil over-the-counter 200 mg daily.  He does have a history of chronic kidney disease, GFR is stable.    He saw Dr. Fermin with orthopedics and had about 2 weeks of improvement with steroid injection and no improvement and actually worsening of symptoms with trial of Synvisc injections.  He continues to use anti-inflammatories as Tylenol is not effective. No significant improvement with gabapentin.    Current regimen: Anti-inflammatories as needed, gabapentin 100 mg BID prn     Essential Hypertension    He has a history of hypertension on treatment. He denies any chest pain, dyspnea on exertion, or palpitations.  No lightheadedness or dizziness to suggest orthostasis.      He admits to taking ibuprofen daily which likely contributes to fluctuations in his blood pressure    Current regimen: carvedilol 12.5 mg BID  Previous regimen: terazosin and lisinopril       Dyslipidemia     Latest Reference Range & Units 04/05/23 10:02   Cholesterol,Tot 100 - 199 mg/dL 224 (H)   Triglycerides 0 - 149 mg/dL 86   HDL >=40 mg/dL 66   LDL <100 mg/dL 141 (H)     Patient has a history of hyperlipidemia and associated coronary artery disease.  He was previously on statin therapy however this was discontinued due to myalgias and arthralgias.  Cardiology had mentioned in a previous note initiated him on PSCK9 inhibitor therapy, but does not look like this has happened yet. He is on a baby aspirin daily. We did complete paperwork for Repatha but he elected to stop taking it. Agreeable to going back on ezetimibe 10 mg daily.            Current Medications:  Current Outpatient Medications Ordered in Epic   Medication Sig Dispense Refill    ezetimibe (ZETIA) 10 MG Tab Take 1 Tablet by mouth every day. 90 Tablet 3    carvedilol (COREG) 12.5 MG Tab TAKE 1 TABLET BY MOUTH TWICE DAILY WITH MEALS 200 Tablet 3    clotrimazole (LOTRIMIN) 1 % Cream Apply 1 Application topically 2 times a day. Left  "foot 28 g 1    gabapentin (NEURONTIN) 100 MG Cap Take 1 capsule by mouth twice daily 200 Capsule 0    vitamin D3 (CHOLECALCIFEROL) 1000 Unit (25 mcg) Tab Take 2,000 Units by mouth every day.      Calcium Carbonate 1500 (600 Ca) MG Tab Take 1 Tablet by mouth every day.      Ibuprofen 200 MG Cap Take  by mouth as needed.      aspirin (ASA) 81 MG Chew Tab chewable tablet Chew 81 mg every day.       No current [facility]-ordered facility-administered medications on file.          Objective:   Physical Exam:    Vitals: /80 (BP Location: Right arm, Patient Position: Sitting, BP Cuff Size: Adult)   Pulse (!) 52   Temp 36.3 °C (97.3 °F) (Temporal)   Resp 16   Ht 1.676 m (5' 6\")   Wt 64 kg (141 lb)   SpO2 96%    BMI: Body mass index is 22.76 kg/m².  Physical Exam  Constitutional:       General: He is not in acute distress.     Appearance: Normal appearance. He is not ill-appearing.   Eyes:      Conjunctiva/sclera: Conjunctivae normal.   Cardiovascular:      Rate and Rhythm: Normal rate and regular rhythm.   Pulmonary:      Effort: Pulmonary effort is normal. No respiratory distress.      Breath sounds: No wheezing or rhonchi.   Musculoskeletal:      Right lower leg: No edema.      Left lower leg: No edema.   Skin:     General: Skin is warm and dry.   Neurological:      Comments: Antalgic gait   Psychiatric:         Mood and Affect: Mood normal.         Thought Content: Thought content normal.      Comments: Flat affect        Assessment and Plan:   Arcenio is a 83 y.o. male with the following:  Problem List Items Addressed This Visit       Bilateral primary osteoarthritis of knee     Chronic decompensated problem, bilateral knee pain continues to limit his quality of life and activity levels. Encouraged him to highly consider knee replacement surgery and at the very least go back in for steroid injections or consideration for genicular neurotomy or peripheral nerve stimulator.         Cerebral atrophy, mild (HCC)     " Chronic stable problem, incidental finding on imaging related to age, no clinical correlates.         Current mild episode of major depressive disorder without prior episode (HCC)     Chronic ongoing problem, provided active listening, encouraged him to pursue more definitive treatment for his knee pain. No pharmacotherapy or therapy referral needed at this time.         Dyslipidemia     Chronic ongoing issue, LDL above goal and with history of CAD with stenting needs better control. He will resume ezetimibe 10 mg daily, lipid clinic referral has been placed, I did paperwork for Repatha in the past but he elected to stop taking it, may be agreeable now.         Relevant Medications    ezetimibe (ZETIA) 10 MG Tab    Other Relevant Orders    Lipid Profile    Essential hypertension     Chronic ongoing problem, continue carvedilol 12.5 mg twice daily, electrolytes are stable.         Relevant Medications    ezetimibe (ZETIA) 10 MG Tab       RTC: Return in about 6 months (around 10/10/2023).    I spent a total of 36 minutes with record review, exam, communication with the patient, communication with other providers, and documentation of this encounter.    PLEASE NOTE: This dictation was created using voice recognition software. I have made every reasonable attempt to correct obvious errors, but I expect that there are errors of grammar and possibly content that I did not discover before finalizing the note.      Mirna Charlton, DO  Geriatric and Internal Medicine  [facility]

## 2023-04-10 NOTE — ASSESSMENT & PLAN NOTE
Chronic ongoing problem, provided active listening, encouraged him to pursue more definitive treatment for his knee pain. No pharmacotherapy or therapy referral needed at this time.

## 2023-04-10 NOTE — ASSESSMENT & PLAN NOTE
Chronic ongoing issue, LDL above goal and with history of CAD with stenting needs better control. He will resume ezetimibe 10 mg daily, lipid clinic referral has been placed, I did paperwork for Repatha in the past but he elected to stop taking it, may be agreeable now.

## 2023-05-08 PROBLEM — M17.9 OSTEOARTHRITIS, KNEE: Status: ACTIVE | Noted: 2023-05-08

## 2023-05-08 ASSESSMENT — FIBROSIS 4 INDEX: FIB4 SCORE: 2.68

## 2023-05-17 ENCOUNTER — TELEPHONE (OUTPATIENT)
Dept: CARDIOLOGY | Facility: MEDICAL CENTER | Age: 83
End: 2023-05-17
Payer: MEDICARE

## 2023-05-17 NOTE — TELEPHONE ENCOUNTER
Received cardiac clearance form from SAÚL Byrnes who states Per TW, need echo results before clearance form can be filled out. Form scanned into [employer] for completion after echo.

## 2023-05-19 ENCOUNTER — HOSPITAL ENCOUNTER (OUTPATIENT)
Dept: CARDIOLOGY | Facility: MEDICAL CENTER | Age: 83
End: 2023-05-19
Attending: INTERNAL MEDICINE
Payer: MEDICARE

## 2023-05-19 DIAGNOSIS — I25.10 CORONARY ARTERY DISEASE INVOLVING NATIVE CORONARY ARTERY OF NATIVE HEART WITHOUT ANGINA PECTORIS: ICD-10-CM

## 2023-05-19 DIAGNOSIS — I10 ESSENTIAL HYPERTENSION: ICD-10-CM

## 2023-05-19 PROCEDURE — 93306 TTE W/DOPPLER COMPLETE: CPT

## 2023-05-22 LAB
LV EJECT FRACT  99904: 55
LV EJECT FRACT MOD 2C 99903: 57.38
LV EJECT FRACT MOD 4C 99902: 48.99
LV EJECT FRACT MOD BP 99901: 52.93

## 2023-05-22 PROCEDURE — 93306 TTE W/DOPPLER COMPLETE: CPT | Mod: 26 | Performed by: INTERNAL MEDICINE

## 2023-06-06 ENCOUNTER — TELEPHONE (OUTPATIENT)
Dept: CARDIOLOGY | Facility: MEDICAL CENTER | Age: 83
End: 2023-06-06
Payer: MEDICARE

## 2023-06-06 ENCOUNTER — PATIENT MESSAGE (OUTPATIENT)
Dept: CARDIOLOGY | Facility: MEDICAL CENTER | Age: 83
End: 2023-06-06
Payer: MEDICARE

## 2023-06-06 NOTE — TELEPHONE ENCOUNTER
TW    Caller:  - Linh (spouse)  They dropped form off at last appt.    Office Name, phone number, fax number: [facility]    Fax clearance to [phone]    Procedure Name:   Knee Replacement    Procedure Scheduled Date: TBD    Callback Number: [phone]    Thank you,   Yocasta DAVE

## 2023-06-06 NOTE — TELEPHONE ENCOUNTER
Patient called, [phone], and voicemail reached. Message left with number provided to return call.    Sokrati message sent to patient, awaiting patient response and will follow up as needed.

## 2023-06-07 NOTE — PATIENT COMMUNICATION
Last OV: 1/12/2023  Proposed Surgery: Left total knee arthroplasty  Surgery Date: Pending  Requesting Office Name: [facility]  Fax Number: [phone]  Preference of Location (default is surgery center unless specified by Cardiologist or [facility])  Prior Clearance Addressed: No      Anticoags/Antiplatelets: Aspirin  Outstanding Cardiac Imaging : No  Stent, Cardiac Devices, or Catheterization: Yes  Date : 2007 and 2016   Ablation, TAVR, Cardioversion: No  Recent Cardiac Hospitalization: No            When: N/A  History (cardiac history):   Past Medical History:   Diagnosis Date    Allergy     Arm fracture, left 02/22/2022    Arthritis 01/04/2022    RA hands/knees    BPH (benign prostatic hyperplasia)     CAD (coronary artery disease)     cardiac stents x 2 2000, 2007 [facility] 2007 prox LAD    Cholelithiasis     CKD (chronic kidney disease) stage 3, GFR 30-59 ml/min ([facility])     Per Problem List    Congestive heart failure (HCC) 2016    Fall 02/22/2022    Left Arm Fracture    Fracture     History of bladder infections     History of coronary artery stent placement 08/01/2014    Prox LAD, also RCA    Hyperlipidemia     Hypertension 01/04/2022    medicated    Myocardial infarct ([facility]) 2007    2 stents placed    Prediabetes     Per Problem List    Statin intolerance 6/26/2018    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

## 2023-06-08 PROBLEM — M17.12 PRIMARY OSTEOARTHRITIS OF LEFT KNEE: Status: ACTIVE | Noted: 2023-05-08

## 2023-06-15 ENCOUNTER — APPOINTMENT (OUTPATIENT)
Dept: ADMISSIONS | Facility: MEDICAL CENTER | Age: 83
End: 2023-06-15
Attending: ORTHOPAEDIC SURGERY
Payer: MEDICARE

## 2023-07-27 ENCOUNTER — TELEPHONE (OUTPATIENT)
Dept: HEALTH INFORMATION MANAGEMENT | Facility: OTHER | Age: 83
End: 2023-07-27

## 2023-07-31 ENCOUNTER — PRE-ADMISSION TESTING (OUTPATIENT)
Dept: ADMISSIONS | Facility: MEDICAL CENTER | Age: 83
End: 2023-07-31
Attending: ORTHOPAEDIC SURGERY
Payer: MEDICARE

## 2023-07-31 DIAGNOSIS — Z01.810 PRE-OPERATIVE CARDIOVASCULAR EXAMINATION: ICD-10-CM

## 2023-07-31 DIAGNOSIS — Z01.812 PRE-OPERATIVE LABORATORY EXAMINATION: ICD-10-CM

## 2023-07-31 RX ORDER — LISINOPRIL 10 MG/1
TABLET ORAL
COMMUNITY
End: 2023-07-31

## 2023-07-31 RX ORDER — GABAPENTIN 100 MG/1
1 CAPSULE ORAL 2 TIMES DAILY
COMMUNITY
End: 2023-07-31

## 2023-07-31 RX ORDER — TERAZOSIN 10 MG/1
CAPSULE ORAL
COMMUNITY
End: 2023-07-31

## 2023-07-31 RX ORDER — IBUPROFEN 200 MG
400 TABLET ORAL EVERY 6 HOURS PRN
COMMUNITY

## 2023-07-31 NOTE — PREPROCEDURE INSTRUCTIONS
Pre-admit appointment completed.  Pt instructed to continue regularly prescribed medications through the day before surgery. Pt instructed to take the following medications the day of surgery with a sip of water, per anesthesia protocol;  COREG.

## 2023-08-02 ENCOUNTER — PRE-ADMISSION TESTING (OUTPATIENT)
Dept: ADMISSIONS | Facility: MEDICAL CENTER | Age: 83
End: 2023-08-02
Attending: ORTHOPAEDIC SURGERY
Payer: MEDICARE

## 2023-08-02 DIAGNOSIS — Z01.810 PRE-OPERATIVE CARDIOVASCULAR EXAMINATION: ICD-10-CM

## 2023-08-02 DIAGNOSIS — Z01.812 PRE-OPERATIVE LABORATORY EXAMINATION: ICD-10-CM

## 2023-08-02 LAB
ANION GAP SERPL CALC-SCNC: 8 MMOL/L (ref 7–16)
BUN SERPL-MCNC: 24 MG/DL (ref 8–22)
CALCIUM SERPL-MCNC: 9.2 MG/DL (ref 8.4–10.2)
CHLORIDE SERPL-SCNC: 107 MMOL/L (ref 96–112)
CO2 SERPL-SCNC: 27 MMOL/L (ref 20–33)
CREAT SERPL-MCNC: 1.13 MG/DL (ref 0.5–1.4)
EKG IMPRESSION: NORMAL
ERYTHROCYTE [DISTWIDTH] IN BLOOD BY AUTOMATED COUNT: 48.8 FL (ref 35.9–50)
EST. AVERAGE GLUCOSE BLD GHB EST-MCNC: 131 MG/DL
GFR SERPLBLD CREATININE-BSD FMLA CKD-EPI: 64 ML/MIN/1.73 M 2
GLUCOSE SERPL-MCNC: 87 MG/DL (ref 65–99)
HBA1C MFR BLD: 6.2 % (ref 4–5.6)
HCT VFR BLD AUTO: 42.9 % (ref 42–52)
HGB BLD-MCNC: 14.1 G/DL (ref 14–18)
MCH RBC QN AUTO: 31.8 PG (ref 27–33)
MCHC RBC AUTO-ENTMCNC: 32.9 G/DL (ref 32.3–36.5)
MCV RBC AUTO: 96.6 FL (ref 81.4–97.8)
PLATELET # BLD AUTO: 155 K/UL (ref 164–446)
PMV BLD AUTO: 11.1 FL (ref 9–12.9)
POTASSIUM SERPL-SCNC: 4.8 MMOL/L (ref 3.6–5.5)
RBC # BLD AUTO: 4.44 M/UL (ref 4.7–6.1)
SCCMEC + MECA PNL NOSE NAA+PROBE: NEGATIVE
SCCMEC + MECA PNL NOSE NAA+PROBE: NEGATIVE
SODIUM SERPL-SCNC: 142 MMOL/L (ref 135–145)
WBC # BLD AUTO: 6.5 K/UL (ref 4.8–10.8)

## 2023-08-02 PROCEDURE — 85027 COMPLETE CBC AUTOMATED: CPT

## 2023-08-02 PROCEDURE — 93005 ELECTROCARDIOGRAM TRACING: CPT

## 2023-08-02 PROCEDURE — 93010 ELECTROCARDIOGRAM REPORT: CPT | Performed by: INTERNAL MEDICINE

## 2023-08-02 PROCEDURE — 87641 MR-STAPH DNA AMP PROBE: CPT

## 2023-08-02 PROCEDURE — 83036 HEMOGLOBIN GLYCOSYLATED A1C: CPT

## 2023-08-02 PROCEDURE — 80048 BASIC METABOLIC PNL TOTAL CA: CPT

## 2023-08-02 PROCEDURE — 87640 STAPH A DNA AMP PROBE: CPT | Mod: XU

## 2023-08-02 PROCEDURE — 36415 COLL VENOUS BLD VENIPUNCTURE: CPT

## 2023-08-02 NOTE — DISCHARGE PLANNING
DISCHARGE PLANNING NOTE - TOTAL JOINT    Procedure: Procedure(s):  ARTHROPLASTY, KNEE, TOTAL  Procedure Date: 8/15/2023  Insurance: Payor: [facility] SENIOR CARE PLUS / Plan: [facility] PARKER  / Product Type: Medicare Advantage /    Equipment currently available at home?  cane, front-wheel walker, raised toilet seat, shower chair, and ice packs  Steps into the home? 4  Steps within the home? 0  Toilet height? Standard  Type of shower?  both  Who will be with you during your recovery? spouse  Is Outpatient Physical Therapy set up after surgery? Yes  Did you take the Total Joint Class and where? Yes, received NAON book.  Planning same day discharge?Yes     This writer met with pt and spouse and educated to preop showers, nasal mrsa swab and potential for overnight stay. Home safety checklist sent home with pt. Pt educated to dc criteria. All questions answered and verbalizes understanding of all instructions. No dc needs identified at this time. Anticipate dc to home without barriers.

## 2023-08-07 RX ORDER — ACETAMINOPHEN 500 MG
1000 TABLET ORAL 3 TIMES DAILY PRN
Qty: 90 TABLET | Refills: 0
Start: 2023-08-07 | End: 2023-09-06

## 2023-08-07 RX ORDER — OXYCODONE HYDROCHLORIDE 5 MG/1
5-10 TABLET ORAL EVERY 4 HOURS PRN
Qty: 42 TABLET | Refills: 0 | Status: SHIPPED | OUTPATIENT
Start: 2023-08-07 | End: 2023-08-14

## 2023-08-07 RX ORDER — DOCUSATE SODIUM 100 MG/1
100 CAPSULE, LIQUID FILLED ORAL 2 TIMES DAILY
Qty: 60 CAPSULE | Refills: 0 | Status: SHIPPED | OUTPATIENT
Start: 2023-08-07 | End: 2023-09-06

## 2023-08-07 RX ORDER — MELOXICAM 7.5 MG/1
7.5 TABLET ORAL DAILY
Qty: 30 TABLET | Refills: 0 | Status: SHIPPED | OUTPATIENT
Start: 2023-08-07 | End: 2023-09-06

## 2023-08-07 RX ORDER — TRAMADOL HYDROCHLORIDE 50 MG/1
50-100 TABLET ORAL EVERY 6 HOURS PRN
Qty: 56 TABLET | Refills: 0 | Status: SHIPPED | OUTPATIENT
Start: 2023-08-07 | End: 2023-08-14

## 2023-08-07 RX ORDER — ASPIRIN 81 MG/1
81 TABLET, CHEWABLE ORAL 2 TIMES DAILY
Qty: 56 TABLET | Refills: 0
Start: 2023-08-07 | End: 2023-09-04

## 2023-08-07 RX ORDER — ASPIRIN 81 MG/1
81 TABLET, CHEWABLE ORAL 2 TIMES DAILY
Qty: 56 TABLET | Refills: 0 | Status: SHIPPED | OUTPATIENT
Start: 2023-08-07 | End: 2023-09-04

## 2023-08-07 NOTE — DISCHARGE INSTRUCTIONS
Dr. John specific discharge instructions:    Patient will be discharged home and follow up with Dr. John in 2 weeks, for which the patient already has scheduled.    You may call or text Dr. John' medical assistant during business hours at [phone].  You may call the emergency [facility] line during nights/weekends/holidays if needed at [phone].    Instructions:  -Leave the bandage in place until your followup appointment in 2 weeks.  -May shower with bandage in place, if bandage becomes soaked underneath please remove and call the office immediately.  -No baths/tubs/pools/submersion of the wound for now.  -Call if there is significant drainage beneath the bandage    -Put as much weight as comfortable on the operative leg.  Use a walker to assist with balance to avoid any falls.  -It is important to start moving and stretching right away, otherwise the joint can stiffen.    -Take your blood clot prevention medication for 4 weeks after surgery (81 mg of Aspirin, twice daily).  -Use ice frequently to help with pain and swelling control  -Pain management:  Standard pain regimen should be:        -Tylenol 1,000mg three times per day (take this automatically)        -Meloxicam one pill daily (take this automatically)        -Tramadol 1-2 pills every 6 hours (take this automatically to start)        -Oxycodone 1-2 pills as needed IN ADDITION to the meds listed above.  Do not take Oxycodone and Tramadol at the same time (space at least 1 hour apart)        -Try to limit the amount of oxycodone since it tends to cause constipation, drowsiness, etc.  But if you need it, take it.        -100mg of Colace (docusate) will be prescribed. Take this twice a day while still taking Tramadol or Oxycodone. Can discontinue after opiates are no longer being taken.  If bowel movement has not occurred in 48 hours please add in a laxative called Senna (over the counter) twice daily in conjunction with the colace. If no bowel  movement is produced 48 hours after adding in Senna please start Milk of Magnesia (over the counter).      AGGRESSIVE RANGE OF MOTION.  Do a quad set a million times per day.  Have a family member push down on the thigh and shin every 20 minutes with 2-3 pillows under the heel to help stretch the knee straight.  Flex the knee as far as possible every 20 minutes.      ACTIVITY: Rest and take it easy for the first 24 hours.  A responsible adult is recommended to remain with you during that time.  It is normal to feel sleepy.  We encourage you to not do anything that requires balance, judgment or coordination.    MILD FLU-LIKE SYMPTOMS ARE NORMAL. YOU MAY EXPERIENCE GENERALIZED MUSCLE ACHES, THROAT IRRITATION, HEADACHE AND/OR SOME NAUSEA.    FOR 24 HOURS DO NOT:  Drive, operate machinery or run household appliances.  Drink beer or alcoholic beverages.   Make important decisions or sign legal documents.      DIET: To avoid nausea, slowly advance diet as tolerated, avoiding spicy or greasy foods for the first day.  Add more substantial food to your diet according to your physician's instructions. INCREASE FLUIDS AND FIBER TO AVOID CONSTIPATION.      FOLLOW-UP APPOINTMENT:  A follow-up appointment should be arranged with your doctor; call to schedule.    You should CALL YOUR PHYSICIAN if you develop:  Fever greater than 101 degrees F.  Pain not relieved by medication, or persistent nausea or vomiting.  Excessive bleeding (blood soaking through dressing) or unexpected drainage from the wound.  Extreme redness or swelling around the incision site, drainage of pus or foul smelling drainage.  Inability to urinate or empty your bladder within 8 hours.  Problems with breathing or chest pain.    You should call 911 if you develop problems with breathing or chest pain.  If you are unable to contact your doctor or surgical center, you should go to the nearest emergency room or urgent care center.  Physician's telephone #:  [phone]    If any questions arise, call your doctor.  If your doctor is not available, please feel free to call the Surgical Center at [phone].     A registered nurse may call you a few days after your surgery to see how you are doing after your procedure.    MEDICATIONS: Resume taking daily medication.  Take prescribed pain medication with food.  If no medication is prescribed, you may take non-aspirin pain medication if needed.  PAIN MEDICATION CAN BE VERY CONSTIPATING.  Take a stool softener or laxative such as senokot, pericolace, or milk of magnesia if needed.    Last pain medication given at 1:42pm Oxycodone 5mg.    If your physician has prescribed pain medication that includes Acetaminophen (Tylenol), do not take additional Acetaminophen (Tylenol) while taking the prescribed medication.      Peripheral Nerve Block Discharge Instructions from Same Day Surgery and Inpatient :    What to Expect - Lower Extremity  The block may cause you to experience numbness and weakness in your hip and thigh, thigh and knee, or calf and foot on the same side as your surgery  Numbness, tingling and / or weakness are all normal. For some people, this may be an unpleasant sensation  These issues will be resolved when the local anesthetic wears off   You may experience numbness and tingling in your thigh on the same side as your surgery if the block medicine was injected at your groin area  Numbness will make it difficult to walk  You may have problems with balance and walking so be very careful   Follow your surgeon's direction regarding weight bearing on your surgical limb  Be very careful with your numb limb  Precautions  The numbness may affect your balance  Be careful when walking or moving around  Your leg may be weak: be very careful putting weight on it  If your surgeon did not specify a time, you should not bear weight for 24 hours  Be sure to ask for help when you need it  It is better to have help than to  "fall and hurt yourself  Prevent Injury  Protect the limb like a baby  Beware of exposing your limb to extreme heat or cold or trauma  The limb may be injured without you noticing because it is numb  Keep the limb elevated whenever possible  Do not sleep on the limb  Change the position of the limb regularly  Avoid putting pressure on your surgical limb  Pain Control  The initial block on the day of surgery will make your extremity feel \"numb\"  Any consecutive injection including prior to discharge from the hospital will make your extremity feel \"numb\"  You may feel an aching or burning when the local anesthesia starts to wear off  Take pain pills as prescribed by your surgeon  Call your surgeon or anesthesiologist if you do not have adequate pain control    Joint Steroid Injection  A joint steroid injection is a procedure to relieve swelling and pain in a joint. Steroids are medicines that reduce inflammation. In this procedure, your health care provider uses a syringe and a needle to inject a steroid medicine into a painful and inflamed joint. A pain-relieving medicine (anesthetic) may be injected along with the steroid. In some cases, your health care provider may use an imaging technique such as ultrasound or fluoroscopy to guide the injection.  Joints that are often treated with steroid injections include the knee, shoulder, hip, and spine. These injections may also be used in the elbow, ankle, and joints of the hands or feet. You may have joint steroid injections as part of your treatment for inflammation caused by:  Gout.  Rheumatoid arthritis.  Advanced wear-and-tear arthritis (osteoarthritis).  Tendinitis.  Bursitis.  Joint steroid injections may be repeated, but having them too often can damage a joint or the skin over the joint. You should not have joint steroid injections less than 6 weeks apart or more than four times a year.  Tell a health care provider about:  Any allergies you have.  All medicines you " are taking, including vitamins, herbs, eye drops, creams, and over-the-counter medicines.  Any problems you or family members have had with anesthetic medicines.  Any blood disorders you have.  Any surgeries you have had.  Any medical conditions you have.  Whether you are pregnant or may be pregnant.  What are the risks?  Generally, this is a safe treatment. However, problems may occur, including:  Infection.  Bleeding.  Allergic reactions to medicines.  Damage to the joint or tissues around the joint.  Thinning of skin or loss of skin color over the joint.  Temporary flushing of the face or chest.  Temporary increase in pain.  Temporary increase in blood sugar.  Failure to relieve inflammation or pain.  What happens before the treatment?  Medicines  Ask your health care provider about:  Changing or stopping your regular medicines. This is especially important if you are taking diabetes medicines or blood thinners.  Taking medicines such as aspirin and ibuprofen. These medicines can thin your blood. Do not take these medicines unless your health care provider tells you to take them.  Taking over-the-counter medicines, vitamins, herbs, and supplements.  General instructions  You may have imaging tests of your joint.  Ask your health care provider if you can drive yourself home after the procedure.  What happens during the treatment?    Your health care provider will position you for the injection and locate the injection site over your joint.  The skin over the joint will be cleaned with a germ-killing soap.  Your health care provider may:  Spray a numbing solution (topical anesthetic) over the injection site.  Inject a local anesthetic under the skin above your joint.  The needle will be placed through your skin into your joint. Your health care provider may use imaging to guide the needle to the right spot for the injection. If imaging is used, a special contrast dye may be injected to confirm that the needle is in  the correct location.  The steroid medicine will be injected into your joint.  Anesthetic may be injected along with the steroid. This may be a medicine that relieves pain for a short time (short-acting anesthetic) or for a longer time (long-acting anesthetic).  The needle will be removed, and an adhesive bandage (dressing) will be placed over the injection site.  The procedure may vary among health care providers and hospitals.  What can I expect after the treatment?  You will be able to go home after the treatment.  It is normal to feel slight flushing for a few days after the injection.  After the treatment, it is common to have an increase in joint pain after the anesthetic has worn off. This may happen about an hour after a short-acting anesthetic or about 8 hours after a longer-acting anesthetic.  You should begin to feel relief from joint pain and swelling after 24 to 48 hours. Contact your health care provider if you do not begin to feel relief after 2 days.  Follow these instructions at home:  Injection site care  Leave the adhesive dressing over your injection site in place until your health care provider says you can remove it.  Check your injection site every day for signs of infection. Check for:  More redness, swelling, or pain.  Fluid or blood.  Warmth.  Pus or a bad smell.  Activity  Return to your normal activities as told by your health care provider. Ask your health care provider what activities are safe for you. You may be asked to limit activities that put stress on the joint for a few days.  Do joint exercises as told by your health care provider.  Do not take baths, swim, or use a hot tub until your health care provider approves. Ask your health care provider if you may take showers. You may only be allowed to take sponge baths.  Managing pain, stiffness, and swelling    If directed, put ice on the joint. To do this:  Put ice in a plastic bag.  Place a towel between your skin and the  bag.  Leave the ice on for 20 minutes, 2-3 times a day.  Remove the ice if your skin turns bright red. This is very important. If you cannot feel pain, heat, or cold, you have a greater risk of damage to the area.  Raise (elevate) your joint above the level of your heart when you are sitting or lying down.  General instructions  Take over-the-counter and prescription medicines only as told by your health care provider.  Do not use any products that contain nicotine or tobacco, such as cigarettes, e-cigarettes, and chewing tobacco. These can delay joint healing. If you need help quitting, ask your health care provider.  If you have diabetes, be aware that your blood sugar may be slightly elevated for several days after the injection.  Keep all follow-up visits. This is important.  Contact a health care provider if you have:  Chills or a fever.  Any signs of infection at your injection site.  Increased pain or swelling or no relief after 2 days.  Summary  A joint steroid injection is a treatment to relieve pain and swelling in a joint.  Steroids are medicines that reduce inflammation. Your health care provider may add an anesthetic along with the steroid.  You may have joint steroid injections as part of your arthritis treatment.  Joint steroid injections may be repeated, but having them too often can damage a joint or the skin over the joint.  Contact your health care provider if you have a fever, chills, or signs of infection, or if you get no relief from joint pain or swelling.  This information is not intended to replace advice given to you by your health care provider. Make sure you discuss any questions you have with your health care provider.  Document Revised: 05/28/2021 Document Reviewed: 05/28/2021  Elsevier Patient Education © 2023 [employer]

## 2023-08-15 ENCOUNTER — HOSPITAL ENCOUNTER (OUTPATIENT)
Facility: MEDICAL CENTER | Age: 83
End: 2023-08-16
Attending: ORTHOPAEDIC SURGERY | Admitting: ORTHOPAEDIC SURGERY
Payer: MEDICARE

## 2023-08-15 ENCOUNTER — ANESTHESIA (OUTPATIENT)
Dept: SURGERY | Facility: MEDICAL CENTER | Age: 83
End: 2023-08-15
Payer: MEDICARE

## 2023-08-15 ENCOUNTER — ANESTHESIA EVENT (OUTPATIENT)
Dept: SURGERY | Facility: MEDICAL CENTER | Age: 83
End: 2023-08-15
Payer: MEDICARE

## 2023-08-15 DIAGNOSIS — G89.18 POST-OPERATIVE PAIN: ICD-10-CM

## 2023-08-15 DIAGNOSIS — M17.12 PRIMARY OSTEOARTHRITIS OF LEFT KNEE: ICD-10-CM

## 2023-08-15 PROCEDURE — A9270 NON-COVERED ITEM OR SERVICE: HCPCS | Performed by: STUDENT IN AN ORGANIZED HEALTH CARE EDUCATION/TRAINING PROGRAM

## 2023-08-15 PROCEDURE — 700105 HCHG RX REV CODE 258: Performed by: STUDENT IN AN ORGANIZED HEALTH CARE EDUCATION/TRAINING PROGRAM

## 2023-08-15 PROCEDURE — 700111 HCHG RX REV CODE 636 W/ 250 OVERRIDE (IP): Performed by: STUDENT IN AN ORGANIZED HEALTH CARE EDUCATION/TRAINING PROGRAM

## 2023-08-15 PROCEDURE — 160029 HCHG SURGERY MINUTES - 1ST 30 MINS LEVEL 4: Performed by: ORTHOPAEDIC SURGERY

## 2023-08-15 PROCEDURE — 160009 HCHG ANES TIME/MIN: Performed by: ORTHOPAEDIC SURGERY

## 2023-08-15 PROCEDURE — 502000 HCHG MISC OR IMPLANTS RC 0278: Performed by: ORTHOPAEDIC SURGERY

## 2023-08-15 PROCEDURE — 160036 HCHG PACU - EA ADDL 30 MINS PHASE I: Performed by: ORTHOPAEDIC SURGERY

## 2023-08-15 PROCEDURE — 700101 HCHG RX REV CODE 250: Performed by: STUDENT IN AN ORGANIZED HEALTH CARE EDUCATION/TRAINING PROGRAM

## 2023-08-15 PROCEDURE — 700111 HCHG RX REV CODE 636 W/ 250 OVERRIDE (IP): Performed by: ORTHOPAEDIC SURGERY

## 2023-08-15 PROCEDURE — 700105 HCHG RX REV CODE 258: Mod: JZ | Performed by: ORTHOPAEDIC SURGERY

## 2023-08-15 PROCEDURE — 160002 HCHG RECOVERY MINUTES (STAT): Performed by: ORTHOPAEDIC SURGERY

## 2023-08-15 PROCEDURE — 700111 HCHG RX REV CODE 636 W/ 250 OVERRIDE (IP): Mod: JZ | Performed by: STUDENT IN AN ORGANIZED HEALTH CARE EDUCATION/TRAINING PROGRAM

## 2023-08-15 PROCEDURE — 97161 PT EVAL LOW COMPLEX 20 MIN: CPT

## 2023-08-15 PROCEDURE — 700101 HCHG RX REV CODE 250: Performed by: ORTHOPAEDIC SURGERY

## 2023-08-15 PROCEDURE — 160048 HCHG OR STATISTICAL LEVEL 1-5: Performed by: ORTHOPAEDIC SURGERY

## 2023-08-15 PROCEDURE — 27447 TOTAL KNEE ARTHROPLASTY: CPT | Mod: LT | Performed by: ORTHOPAEDIC SURGERY

## 2023-08-15 PROCEDURE — 160035 HCHG PACU - 1ST 60 MINS PHASE I: Performed by: ORTHOPAEDIC SURGERY

## 2023-08-15 PROCEDURE — 96365 THER/PROPH/DIAG IV INF INIT: CPT

## 2023-08-15 PROCEDURE — 502240 HCHG MISC OR SUPPLY RC 0272: Performed by: ORTHOPAEDIC SURGERY

## 2023-08-15 PROCEDURE — 20610 DRAIN/INJ JOINT/BURSA W/O US: CPT | Mod: ASROC,59,RT | Performed by: STUDENT IN AN ORGANIZED HEALTH CARE EDUCATION/TRAINING PROGRAM

## 2023-08-15 PROCEDURE — C1776 JOINT DEVICE (IMPLANTABLE): HCPCS | Performed by: ORTHOPAEDIC SURGERY

## 2023-08-15 PROCEDURE — 96376 TX/PRO/DX INJ SAME DRUG ADON: CPT

## 2023-08-15 PROCEDURE — G0378 HOSPITAL OBSERVATION PER HR: HCPCS

## 2023-08-15 PROCEDURE — 96375 TX/PRO/DX INJ NEW DRUG ADDON: CPT

## 2023-08-15 PROCEDURE — 94760 N-INVAS EAR/PLS OXIMETRY 1: CPT

## 2023-08-15 PROCEDURE — 160041 HCHG SURGERY MINUTES - EA ADDL 1 MIN LEVEL 4: Performed by: ORTHOPAEDIC SURGERY

## 2023-08-15 PROCEDURE — 64447 NJX AA&/STRD FEMORAL NRV IMG: CPT | Performed by: ORTHOPAEDIC SURGERY

## 2023-08-15 PROCEDURE — 700102 HCHG RX REV CODE 250 W/ 637 OVERRIDE(OP): Performed by: STUDENT IN AN ORGANIZED HEALTH CARE EDUCATION/TRAINING PROGRAM

## 2023-08-15 PROCEDURE — 27447 TOTAL KNEE ARTHROPLASTY: CPT | Mod: ASROC,LT | Performed by: STUDENT IN AN ORGANIZED HEALTH CARE EDUCATION/TRAINING PROGRAM

## 2023-08-15 PROCEDURE — 20610 DRAIN/INJ JOINT/BURSA W/O US: CPT | Mod: 59,RT | Performed by: ORTHOPAEDIC SURGERY

## 2023-08-15 DEVICE — BASEPLATE TIBIAL TRIATHLON TRITANIUM SIZE 6 (1EA): Type: IMPLANTABLE DEVICE | Site: KNEE | Status: FUNCTIONAL

## 2023-08-15 DEVICE — IMPLANTABLE DEVICE: Type: IMPLANTABLE DEVICE | Site: KNEE | Status: FUNCTIONAL

## 2023-08-15 RX ORDER — SODIUM CHLORIDE 9 MG/ML
INJECTION, SOLUTION INTRAMUSCULAR; INTRAVENOUS; SUBCUTANEOUS
Status: DISCONTINUED | OUTPATIENT
Start: 2023-08-15 | End: 2023-08-15 | Stop reason: HOSPADM

## 2023-08-15 RX ORDER — LABETALOL HYDROCHLORIDE 5 MG/ML
5 INJECTION, SOLUTION INTRAVENOUS
Status: DISCONTINUED | OUTPATIENT
Start: 2023-08-15 | End: 2023-08-15 | Stop reason: HOSPADM

## 2023-08-15 RX ORDER — HYDROMORPHONE HYDROCHLORIDE 1 MG/ML
0.5 INJECTION, SOLUTION INTRAMUSCULAR; INTRAVENOUS; SUBCUTANEOUS
Status: DISCONTINUED | OUTPATIENT
Start: 2023-08-15 | End: 2023-08-16 | Stop reason: HOSPADM

## 2023-08-15 RX ORDER — HALOPERIDOL 5 MG/ML
1 INJECTION INTRAMUSCULAR
Status: DISCONTINUED | OUTPATIENT
Start: 2023-08-15 | End: 2023-08-15 | Stop reason: HOSPADM

## 2023-08-15 RX ORDER — DIPHENHYDRAMINE HCL 25 MG
25 TABLET ORAL NIGHTLY PRN
Status: DISCONTINUED | OUTPATIENT
Start: 2023-08-16 | End: 2023-08-16 | Stop reason: HOSPADM

## 2023-08-15 RX ORDER — DIPHENHYDRAMINE HYDROCHLORIDE 50 MG/ML
12.5 INJECTION INTRAMUSCULAR; INTRAVENOUS
Status: DISCONTINUED | OUTPATIENT
Start: 2023-08-15 | End: 2023-08-15 | Stop reason: HOSPADM

## 2023-08-15 RX ORDER — TRANEXAMIC ACID 100 MG/ML
INJECTION, SOLUTION INTRAVENOUS PRN
Status: DISCONTINUED | OUTPATIENT
Start: 2023-08-15 | End: 2023-08-15 | Stop reason: SURG

## 2023-08-15 RX ORDER — OXYCODONE HYDROCHLORIDE 5 MG/1
5 TABLET ORAL
Status: DISCONTINUED | OUTPATIENT
Start: 2023-08-15 | End: 2023-08-16 | Stop reason: HOSPADM

## 2023-08-15 RX ORDER — METHYLPREDNISOLONE ACETATE 80 MG/ML
INJECTION, SUSPENSION INTRA-ARTICULAR; INTRALESIONAL; INTRAMUSCULAR; SOFT TISSUE
Status: DISCONTINUED | OUTPATIENT
Start: 2023-08-15 | End: 2023-08-15 | Stop reason: HOSPADM

## 2023-08-15 RX ORDER — METOPROLOL TARTRATE 1 MG/ML
1 INJECTION, SOLUTION INTRAVENOUS
Status: DISCONTINUED | OUTPATIENT
Start: 2023-08-15 | End: 2023-08-15 | Stop reason: HOSPADM

## 2023-08-15 RX ORDER — HYDRALAZINE HYDROCHLORIDE 20 MG/ML
INJECTION INTRAMUSCULAR; INTRAVENOUS PRN
Status: DISCONTINUED | OUTPATIENT
Start: 2023-08-15 | End: 2023-08-15 | Stop reason: SURG

## 2023-08-15 RX ORDER — ACETAMINOPHEN 500 MG
1000 TABLET ORAL EVERY 6 HOURS PRN
Status: DISCONTINUED | OUTPATIENT
Start: 2023-08-20 | End: 2023-08-16 | Stop reason: HOSPADM

## 2023-08-15 RX ORDER — DEXAMETHASONE SODIUM PHOSPHATE 4 MG/ML
4 INJECTION, SOLUTION INTRA-ARTICULAR; INTRALESIONAL; INTRAMUSCULAR; INTRAVENOUS; SOFT TISSUE
Status: DISCONTINUED | OUTPATIENT
Start: 2023-08-15 | End: 2023-08-16 | Stop reason: HOSPADM

## 2023-08-15 RX ORDER — SODIUM CHLORIDE, SODIUM LACTATE, POTASSIUM CHLORIDE, CALCIUM CHLORIDE 600; 310; 30; 20 MG/100ML; MG/100ML; MG/100ML; MG/100ML
INJECTION, SOLUTION INTRAVENOUS CONTINUOUS
Status: ACTIVE | OUTPATIENT
Start: 2023-08-15 | End: 2023-08-15

## 2023-08-15 RX ORDER — AMOXICILLIN 250 MG
1 CAPSULE ORAL
Status: DISCONTINUED | OUTPATIENT
Start: 2023-08-15 | End: 2023-08-16 | Stop reason: HOSPADM

## 2023-08-15 RX ORDER — DEXAMETHASONE SODIUM PHOSPHATE 4 MG/ML
INJECTION, SOLUTION INTRA-ARTICULAR; INTRALESIONAL; INTRAMUSCULAR; INTRAVENOUS; SOFT TISSUE PRN
Status: DISCONTINUED | OUTPATIENT
Start: 2023-08-15 | End: 2023-08-15 | Stop reason: SURG

## 2023-08-15 RX ORDER — SCOLOPAMINE TRANSDERMAL SYSTEM 1 MG/1
1 PATCH, EXTENDED RELEASE TRANSDERMAL
Status: DISCONTINUED | OUTPATIENT
Start: 2023-08-15 | End: 2023-08-16 | Stop reason: HOSPADM

## 2023-08-15 RX ORDER — HYDRALAZINE HYDROCHLORIDE 20 MG/ML
5 INJECTION INTRAMUSCULAR; INTRAVENOUS
Status: DISCONTINUED | OUTPATIENT
Start: 2023-08-15 | End: 2023-08-15 | Stop reason: HOSPADM

## 2023-08-15 RX ORDER — ACETAMINOPHEN 500 MG
1000 TABLET ORAL ONCE
Status: COMPLETED | OUTPATIENT
Start: 2023-08-15 | End: 2023-08-15

## 2023-08-15 RX ORDER — DIPHENHYDRAMINE HCL 25 MG
25 TABLET ORAL EVERY 6 HOURS PRN
Status: DISCONTINUED | OUTPATIENT
Start: 2023-08-15 | End: 2023-08-16 | Stop reason: HOSPADM

## 2023-08-15 RX ORDER — ASPIRIN 81 MG/1
81 TABLET, CHEWABLE ORAL DAILY
COMMUNITY

## 2023-08-15 RX ORDER — CEFAZOLIN SODIUM 1 G/3ML
2 INJECTION, POWDER, FOR SOLUTION INTRAMUSCULAR; INTRAVENOUS ONCE
Status: DISCONTINUED | OUTPATIENT
Start: 2023-08-15 | End: 2023-08-15 | Stop reason: HOSPADM

## 2023-08-15 RX ORDER — OXYCODONE HCL 5 MG/5 ML
10 SOLUTION, ORAL ORAL
Status: COMPLETED | OUTPATIENT
Start: 2023-08-15 | End: 2023-08-15

## 2023-08-15 RX ORDER — ONDANSETRON 2 MG/ML
4 INJECTION INTRAMUSCULAR; INTRAVENOUS
Status: DISCONTINUED | OUTPATIENT
Start: 2023-08-15 | End: 2023-08-15 | Stop reason: HOSPADM

## 2023-08-15 RX ORDER — BISACODYL 10 MG
10 SUPPOSITORY, RECTAL RECTAL
Status: DISCONTINUED | OUTPATIENT
Start: 2023-08-15 | End: 2023-08-16 | Stop reason: HOSPADM

## 2023-08-15 RX ORDER — IPRATROPIUM BROMIDE AND ALBUTEROL SULFATE 2.5; .5 MG/3ML; MG/3ML
3 SOLUTION RESPIRATORY (INHALATION)
Status: DISCONTINUED | OUTPATIENT
Start: 2023-08-15 | End: 2023-08-15 | Stop reason: HOSPADM

## 2023-08-15 RX ORDER — GABAPENTIN 300 MG/1
300 CAPSULE ORAL ONCE
Status: CANCELLED | OUTPATIENT
Start: 2023-08-15 | End: 2023-08-15

## 2023-08-15 RX ORDER — IBUPROFEN 400 MG/1
800 TABLET ORAL 3 TIMES DAILY PRN
Status: DISCONTINUED | OUTPATIENT
Start: 2023-08-18 | End: 2023-08-16 | Stop reason: HOSPADM

## 2023-08-15 RX ORDER — OXYCODONE HCL 5 MG/5 ML
5 SOLUTION, ORAL ORAL
Status: COMPLETED | OUTPATIENT
Start: 2023-08-15 | End: 2023-08-15

## 2023-08-15 RX ORDER — KETOROLAC TROMETHAMINE 30 MG/ML
15 INJECTION, SOLUTION INTRAMUSCULAR; INTRAVENOUS EVERY 6 HOURS
Status: DISCONTINUED | OUTPATIENT
Start: 2023-08-15 | End: 2023-08-16 | Stop reason: HOSPADM

## 2023-08-15 RX ORDER — ROPIVACAINE HYDROCHLORIDE 5 MG/ML
INJECTION, SOLUTION EPIDURAL; INFILTRATION; PERINEURAL
Status: DISCONTINUED | OUTPATIENT
Start: 2023-08-15 | End: 2023-08-15 | Stop reason: HOSPADM

## 2023-08-15 RX ORDER — DIPHENHYDRAMINE HYDROCHLORIDE 50 MG/ML
25 INJECTION INTRAMUSCULAR; INTRAVENOUS EVERY 6 HOURS PRN
Status: DISCONTINUED | OUTPATIENT
Start: 2023-08-15 | End: 2023-08-16 | Stop reason: HOSPADM

## 2023-08-15 RX ORDER — TRAMADOL HYDROCHLORIDE 50 MG/1
50 TABLET ORAL EVERY 4 HOURS PRN
Status: DISCONTINUED | OUTPATIENT
Start: 2023-08-15 | End: 2023-08-16 | Stop reason: HOSPADM

## 2023-08-15 RX ORDER — ASPIRIN 81 MG/1
81 TABLET ORAL 2 TIMES DAILY
Status: DISCONTINUED | OUTPATIENT
Start: 2023-08-16 | End: 2023-08-16 | Stop reason: HOSPADM

## 2023-08-15 RX ORDER — EPINEPHRINE 1 MG/ML(1)
AMPUL (ML) INJECTION
Status: DISCONTINUED | OUTPATIENT
Start: 2023-08-15 | End: 2023-08-15 | Stop reason: HOSPADM

## 2023-08-15 RX ORDER — HYDROMORPHONE HYDROCHLORIDE 1 MG/ML
0.2 INJECTION, SOLUTION INTRAMUSCULAR; INTRAVENOUS; SUBCUTANEOUS
Status: DISCONTINUED | OUTPATIENT
Start: 2023-08-15 | End: 2023-08-15 | Stop reason: HOSPADM

## 2023-08-15 RX ORDER — HALOPERIDOL 5 MG/ML
1 INJECTION INTRAMUSCULAR EVERY 6 HOURS PRN
Status: DISCONTINUED | OUTPATIENT
Start: 2023-08-15 | End: 2023-08-16 | Stop reason: HOSPADM

## 2023-08-15 RX ORDER — VANCOMYCIN HYDROCHLORIDE 1 G/20ML
INJECTION, POWDER, LYOPHILIZED, FOR SOLUTION INTRAVENOUS
Status: COMPLETED | OUTPATIENT
Start: 2023-08-15 | End: 2023-08-15

## 2023-08-15 RX ORDER — DOCUSATE SODIUM 100 MG/1
100 CAPSULE, LIQUID FILLED ORAL 2 TIMES DAILY
Status: DISCONTINUED | OUTPATIENT
Start: 2023-08-15 | End: 2023-08-16 | Stop reason: HOSPADM

## 2023-08-15 RX ORDER — POLYETHYLENE GLYCOL 3350 17 G/17G
1 POWDER, FOR SOLUTION ORAL 2 TIMES DAILY PRN
Status: DISCONTINUED | OUTPATIENT
Start: 2023-08-15 | End: 2023-08-16 | Stop reason: HOSPADM

## 2023-08-15 RX ORDER — ONDANSETRON 2 MG/ML
4 INJECTION INTRAMUSCULAR; INTRAVENOUS EVERY 4 HOURS PRN
Status: DISCONTINUED | OUTPATIENT
Start: 2023-08-15 | End: 2023-08-16 | Stop reason: HOSPADM

## 2023-08-15 RX ORDER — HYDROMORPHONE HYDROCHLORIDE 1 MG/ML
0.4 INJECTION, SOLUTION INTRAMUSCULAR; INTRAVENOUS; SUBCUTANEOUS
Status: DISCONTINUED | OUTPATIENT
Start: 2023-08-15 | End: 2023-08-15 | Stop reason: HOSPADM

## 2023-08-15 RX ORDER — CEFAZOLIN SODIUM 1 G/3ML
INJECTION, POWDER, FOR SOLUTION INTRAMUSCULAR; INTRAVENOUS PRN
Status: DISCONTINUED | OUTPATIENT
Start: 2023-08-15 | End: 2023-08-15 | Stop reason: SURG

## 2023-08-15 RX ORDER — ONDANSETRON 2 MG/ML
INJECTION INTRAMUSCULAR; INTRAVENOUS PRN
Status: DISCONTINUED | OUTPATIENT
Start: 2023-08-15 | End: 2023-08-15 | Stop reason: SURG

## 2023-08-15 RX ORDER — ENEMA 19; 7 G/133ML; G/133ML
1 ENEMA RECTAL
Status: DISCONTINUED | OUTPATIENT
Start: 2023-08-15 | End: 2023-08-16 | Stop reason: HOSPADM

## 2023-08-15 RX ORDER — EPHEDRINE SULFATE 50 MG/ML
5 INJECTION, SOLUTION INTRAVENOUS
Status: DISCONTINUED | OUTPATIENT
Start: 2023-08-15 | End: 2023-08-15 | Stop reason: HOSPADM

## 2023-08-15 RX ORDER — ACETAMINOPHEN 500 MG
1000 TABLET ORAL EVERY 6 HOURS
Status: DISCONTINUED | OUTPATIENT
Start: 2023-08-15 | End: 2023-08-16 | Stop reason: HOSPADM

## 2023-08-15 RX ORDER — OMEPRAZOLE 20 MG/1
20 CAPSULE, DELAYED RELEASE ORAL 2 TIMES DAILY PRN
Status: DISCONTINUED | OUTPATIENT
Start: 2023-08-15 | End: 2023-08-16 | Stop reason: HOSPADM

## 2023-08-15 RX ORDER — AMOXICILLIN 250 MG
1 CAPSULE ORAL NIGHTLY
Status: DISCONTINUED | OUTPATIENT
Start: 2023-08-15 | End: 2023-08-16 | Stop reason: HOSPADM

## 2023-08-15 RX ORDER — OXYCODONE HYDROCHLORIDE 10 MG/1
10 TABLET ORAL
Status: DISCONTINUED | OUTPATIENT
Start: 2023-08-15 | End: 2023-08-16 | Stop reason: HOSPADM

## 2023-08-15 RX ORDER — HYDROMORPHONE HYDROCHLORIDE 1 MG/ML
0.1 INJECTION, SOLUTION INTRAMUSCULAR; INTRAVENOUS; SUBCUTANEOUS
Status: DISCONTINUED | OUTPATIENT
Start: 2023-08-15 | End: 2023-08-15 | Stop reason: HOSPADM

## 2023-08-15 RX ORDER — CARVEDILOL 6.25 MG/1
12.5 TABLET ORAL 2 TIMES DAILY WITH MEALS
Status: DISCONTINUED | OUTPATIENT
Start: 2023-08-15 | End: 2023-08-16 | Stop reason: HOSPADM

## 2023-08-15 RX ADMIN — FENTANYL CITRATE 50 MCG: 50 INJECTION, SOLUTION INTRAMUSCULAR; INTRAVENOUS at 11:59

## 2023-08-15 RX ADMIN — TRANEXAMIC ACID 1000 MG: 100 INJECTION, SOLUTION INTRAVENOUS at 11:39

## 2023-08-15 RX ADMIN — FENTANYL CITRATE 25 MCG: 50 INJECTION, SOLUTION INTRAMUSCULAR; INTRAVENOUS at 13:42

## 2023-08-15 RX ADMIN — CEFAZOLIN 2 G: 1 INJECTION, POWDER, FOR SOLUTION INTRAMUSCULAR; INTRAVENOUS at 11:38

## 2023-08-15 RX ADMIN — KETOROLAC TROMETHAMINE 15 MG: 30 INJECTION, SOLUTION INTRAMUSCULAR; INTRAVENOUS at 23:15

## 2023-08-15 RX ADMIN — OXYCODONE HYDROCHLORIDE 5 MG: 5 SOLUTION ORAL at 13:42

## 2023-08-15 RX ADMIN — ACETAMINOPHEN 1000 MG: 500 TABLET ORAL at 09:20

## 2023-08-15 RX ADMIN — DEXAMETHASONE SODIUM PHOSPHATE 8 MG: 4 INJECTION INTRA-ARTICULAR; INTRALESIONAL; INTRAMUSCULAR; INTRAVENOUS; SOFT TISSUE at 11:38

## 2023-08-15 RX ADMIN — FENTANYL CITRATE 50 MCG: 50 INJECTION, SOLUTION INTRAMUSCULAR; INTRAVENOUS at 11:36

## 2023-08-15 RX ADMIN — SODIUM CHLORIDE, POTASSIUM CHLORIDE, SODIUM LACTATE AND CALCIUM CHLORIDE: 600; 310; 30; 20 INJECTION, SOLUTION INTRAVENOUS at 11:33

## 2023-08-15 RX ADMIN — SUGAMMADEX 200 MG: 100 INJECTION, SOLUTION INTRAVENOUS at 12:29

## 2023-08-15 RX ADMIN — CEFAZOLIN 2 G: 2 INJECTION, POWDER, FOR SOLUTION INTRAMUSCULAR; INTRAVENOUS at 16:33

## 2023-08-15 RX ADMIN — HYDRALAZINE HYDROCHLORIDE 4 MG: 20 INJECTION, SOLUTION INTRAMUSCULAR; INTRAVENOUS at 12:04

## 2023-08-15 RX ADMIN — ACETAMINOPHEN 1000 MG: 500 TABLET ORAL at 17:35

## 2023-08-15 RX ADMIN — PROPOFOL 100 MG: 10 INJECTION, EMULSION INTRAVENOUS at 11:37

## 2023-08-15 RX ADMIN — ONDANSETRON 4 MG: 2 INJECTION INTRAMUSCULAR; INTRAVENOUS at 11:38

## 2023-08-15 RX ADMIN — KETOROLAC TROMETHAMINE 15 MG: 30 INJECTION, SOLUTION INTRAMUSCULAR; INTRAVENOUS at 17:36

## 2023-08-15 RX ADMIN — DOCUSATE SODIUM 100 MG: 100 CAPSULE, LIQUID FILLED ORAL at 17:35

## 2023-08-15 RX ADMIN — ACETAMINOPHEN 1000 MG: 500 TABLET ORAL at 23:14

## 2023-08-15 RX ADMIN — FENTANYL CITRATE 50 MCG: 50 INJECTION, SOLUTION INTRAMUSCULAR; INTRAVENOUS at 12:33

## 2023-08-15 ASSESSMENT — PAIN DESCRIPTION - PAIN TYPE
TYPE: SURGICAL PAIN
TYPE: CHRONIC PAIN;ACUTE PAIN

## 2023-08-15 ASSESSMENT — COGNITIVE AND FUNCTIONAL STATUS - GENERAL
PERSONAL GROOMING: A LITTLE
DRESSING REGULAR UPPER BODY CLOTHING: A LITTLE
TOILETING: A LITTLE
MOVING TO AND FROM BED TO CHAIR: A LITTLE
CLIMB 3 TO 5 STEPS WITH RAILING: A LOT
DAILY ACTIVITIY SCORE: 18
SUGGESTED CMS G CODE MODIFIER MOBILITY: CK
MOBILITY SCORE: 17
CLIMB 3 TO 5 STEPS WITH RAILING: A LOT
DRESSING REGULAR LOWER BODY CLOTHING: A LOT
TURNING FROM BACK TO SIDE WHILE IN FLAT BAD: A LITTLE
STANDING UP FROM CHAIR USING ARMS: A LITTLE
MOBILITY SCORE: 17
STANDING UP FROM CHAIR USING ARMS: A LITTLE
WALKING IN HOSPITAL ROOM: A LITTLE
MOVING FROM LYING ON BACK TO SITTING ON SIDE OF FLAT BED: A LITTLE
MOVING FROM LYING ON BACK TO SITTING ON SIDE OF FLAT BED: A LITTLE
SUGGESTED CMS G CODE MODIFIER MOBILITY: CK
WALKING IN HOSPITAL ROOM: A LITTLE
MOVING TO AND FROM BED TO CHAIR: A LITTLE
TURNING FROM BACK TO SIDE WHILE IN FLAT BAD: A LITTLE
SUGGESTED CMS G CODE MODIFIER DAILY ACTIVITY: CK
HELP NEEDED FOR BATHING: A LITTLE

## 2023-08-15 ASSESSMENT — LIFESTYLE VARIABLES
AVERAGE NUMBER OF DAYS PER WEEK YOU HAVE A DRINK CONTAINING ALCOHOL: 0
TOTAL SCORE: 0
HOW MANY TIMES IN THE PAST YEAR HAVE YOU HAD 5 OR MORE DRINKS IN A DAY: 0
ON A TYPICAL DAY WHEN YOU DRINK ALCOHOL HOW MANY DRINKS DO YOU HAVE: 0
CONSUMPTION TOTAL: NEGATIVE
HAVE PEOPLE ANNOYED YOU BY CRITICIZING YOUR DRINKING: NO
EVER HAD A DRINK FIRST THING IN THE MORNING TO STEADY YOUR NERVES TO GET RID OF A HANGOVER: NO
HAVE YOU EVER FELT YOU SHOULD CUT DOWN ON YOUR DRINKING: NO
EVER FELT BAD OR GUILTY ABOUT YOUR DRINKING: NO
ALCOHOL_USE: NO
TOTAL SCORE: 0
TOTAL SCORE: 0

## 2023-08-15 ASSESSMENT — FIBROSIS 4 INDEX
FIB4 SCORE: 3.08

## 2023-08-15 ASSESSMENT — PATIENT HEALTH QUESTIONNAIRE - PHQ9
2. FEELING DOWN, DEPRESSED, IRRITABLE, OR HOPELESS: NOT AT ALL
1. LITTLE INTEREST OR PLEASURE IN DOING THINGS: NOT AT ALL
SUM OF ALL RESPONSES TO PHQ9 QUESTIONS 1 AND 2: 0

## 2023-08-15 ASSESSMENT — GAIT ASSESSMENTS
GAIT LEVEL OF ASSIST: CONTACT GUARD ASSIST
DISTANCE (FEET): 50
ASSISTIVE DEVICE: FRONT WHEEL WALKER
DEVIATION: SHUFFLED GAIT;ANTALGIC;STEP TO

## 2023-08-15 NOTE — ANESTHESIA PROCEDURE NOTES
Peripheral Block    Date/Time: 8/15/2023 11:09 AM    Performed by: Alessio Black M.D.  Authorized by: Alessio Black M.D.    Start Time:  8/15/2023 11:09 AM  End Time:  8/15/2023 11:11 AM  Reason for Block: at surgeon's request and post-op pain management ONLY    patient identified, IV checked, site marked, risks and benefits discussed, surgical consent, monitors and equipment checked, pre-op evaluation and timeout performed    Patient Position:  Supine  Prep: ChloraPrep    Monitoring:  Heart rate, continuous pulse ox and cardiac monitor  Block Region:  Lower Extremity  Lower Extremity - Block Type:  Selective FEMORAL nerve block at the Adductor Canal    Laterality:  Left  Procedures: ultrasound guided  Image captured, interpreted and electronically stored.  Local Infiltration:  Lidocaine  Strength:  1 %  Dose:  3 ml  Block Type:  Single-shot  Needle Length:  100mm  Needle Gauge:  21 G  Needle Localization:  Ultrasound guidance  Injection Assessment:  Negative aspiration for heme, no paresthesia on injection, incremental injection and local visualized surrounding nerve on ultrasound  Evidence of intravascular injection: No     US Guided Selective Femoral Nerve Block at Adductor Canal:   US probe placed at mid-thigh level on externally rotated leg and femur identified.  Probe directed medially until Sartorius Muscle (SM), Femoral Artery (FA) and Saphenous Nerve (SN) identified in Adductor Canal (AC).  Needle inserted anterolateral to probe in an in plane approach into a subsartorial perivascular perineural position.  After negative aspiration LA injected with ease and visualized spreading within the AC.\

## 2023-08-15 NOTE — OR NURSING
1240: Patient arrived from OR via gurney.  Surgical dressing on left knee CDI, +block; pedal pulse +2. Cold pack in place, no complaints of pain or nausea at this time patient is sleeping calmly.    Sedation/Resp Status: Non responsive to verbal with OPA in place, no eye opening to verbal.  Respirations spontaneous and non-labored.    HR 68SR; VSS on 6L 02 via simple mask.     1255: Surgical site CDI, no complaints of pain or nausea at this time, vital signs are stable.     1305: Report to Sophy BELTRÁN.    1335: Report back from Sophy BELTRÁN.     1340: Surgical site CDI, no complaints nausea at this time, vital signs are stable. Complains of 6 out of 10 pain at this time, will medicate per MAR order.     1342: Given Oxycodone 5mg and Fentanyl 25mcg per MAR order.     1350: Instructed on IS use, demonstrated good efforts to 2500 for 3 breaths.      1410: Surgical site CDI, no complaints nausea at this time, vital signs are stable. Complains of 6 out of 10 pain at this time, will medicate per MAR order.     1415: Patient stood at bedside, appears weak and shaky and was returned to bed.     1432: PT notified of attempt to stand patient, PT recommends the patient goes to the floor due to failed mobility assessment.     1442: Dwayne VELOZ notified that PT recommends patient admission.     1450: Report called to Omaira BELTRÁN.    1500: Patient transferred to the floor by transport.

## 2023-08-15 NOTE — ANESTHESIA PROCEDURE NOTES
Airway    Date/Time: 8/15/2023 11:38 AM    Performed by: Alessio Black M.D.  Authorized by: Alessio Black M.D.    Location:  OR  Urgency:  Elective  Indications for Airway Management:  Anesthesia      Spontaneous Ventilation: absent    Sedation Level:  Deep  Preoxygenated: Yes    Final Airway Type:  Supraglottic airway  Final Supraglottic Airway:  Standard LMA    SGA Size:  5  Number of Attempts at Approach:  1  Ventilation Between Attempts:  None  Number of Other Approaches Attempted:  0

## 2023-08-15 NOTE — OP REPORT
Preop Diagnosis: Advanced bilateral knee arthritis  Postop Diagnosis:  Same  Procedure: Left total knee arthroplasty, Right knee steroid injection  Surgeon: Dr. Phoenix John MD  Assistant: Dwayne Lombardo PA-C  Anesthesia: Dr. Black. General + Adductor canal block  Estimated Blood Loss: 50cc  Drains: None  Complications: None    Implants: Francisco Triathlon CR Cementless, size 7 femur, size 6 tibia, with a 9 mm CS insert and 38 oval patella.    Indications: Arcenio Barnett is a 83 y.o. male who has had severe progressive knee pain that failed conservative management.  There were severe degenerative changes on radiographs.  We discussed the options and he was ultimately indicated for knee replacement.  We discussed the risk of bleeding, transfusion, pain, neurovascular injury, stiffness, fracture, infection, wound complication, loosening of the parts over time, blood clots, and medical complication.  No guarantees were made and he elected to proceed.    Pertinent Findings and Releases: There were severe degenerative changes with a very stiff knee, beginning at .  At the end of the case, the knee easily came to full extension and flexed up to calf/thigh impingement with the arthrotomy closed.  The patella tracked centrally.    Informed consent and site marking were confirmed in preop.  An adductor canal block had been performed by the anesthesiologist, and then he was brought back to the OR where anesthesia was performed. Supine positioning was perfmored with all bony prominences well padded with a padded tourniquet on the thigh.  The extremity was prepped and draped in the usual sterile fashion. I performed a pre-procedure timeout to confirm we had the correct patient, side, site, procedure, and presence of necessary personal and equipment.  I confirmed that Ancef and tranexamic acid were given.  The tourniquet was then inflated.    A midline incision was made medial to the tibial tubercle centered over patella  taken down to the deep capsule of the knee. A medial parapatellar arthrotomy was performed.  The fat pad was released. The patella was subluxated and the knee was flexed. The remnants of the anterior horn of the medial and lateral meniscus were removed as well as the ACL from the intercondylar notch. All distal protruding osteophytes were removed. I then drilled and accessed the intramedullary canal of the femur, lavaged it and placed the intramedullary cutting guide. The distal femur was cut in 5 degrees of valgus. I then sized the femur and based rotation off of bony landmarks.  All distal femoral cuts were made.  The tibia was then subluxated forward and cut perpendicular to its long axis.  A spacer block was placed in the knee extension to confirm I had resected enough bone and then sequential releases were performed until there was a rectangular gap.  Collaterals were intact and overall limb alignment was checked and appropriate.  The knee was then tensed at 90 degrees and the meniscal remnants and posterior osteophytes were removed.  The posterior capsule was injected with a local anesthetic cocktail.  The tibia was then subluxed forward, sized, and punched in the appropriate amount of external rotation and mechanical alignment was verified using a drop tiesha. Trials were placed, the knee was reduced with a trial insert, it was taken through a range of motion, and found to be stable in the varus/valgus plane 0-30 degrees of flexion and the AP plane at 90 degrees of flexion. Attention was then turned to the patella. A symmetric resection was performed to recreate native patella height and appropriately sized. All extraneous osteophyte and synovium were removed.  With a trial in place, the patella tracked centrally using the no thumbs technique. All trial components were removed. The real components were impacted and the trial liner was place and found to be appropriate.  The tourniquet was let down and hemostasis  ensured. The real insert was placed. Stability and patellar tracking were excellent. The knee was then copiously irrigated. One gram of Vancomycin was left in the wound.  The arthrotomy was closed with number 2 running barbed suture, and the wound was closed in layers.  An occlusive silver dressing was applied.    The right knee was then prepped and injected with 2cc DepoMedrol and local and the patient was turned over to anesthesia in stable condition without any apparent intraoperative complications.     Plan:  WBAT with a walker (which will need to be provided if they do not already have one due to weakness, imbalance, and fall risk), PT/OT evaluation, Aspirin 81mg BID for 4 weeks for DVT prophylaxis, Leave dressing in place until followup.

## 2023-08-15 NOTE — PROGRESS NOTES
4 Eyes Skin Assessment Completed by Omaira RN and Avinash, RN.    Head WDL  Ears WDL  Nose Redness and Non-Blanching  Mouth WDL  Neck WDL  Breast/Chest WDL  Shoulder Blades WDL  Spine WDL  (R) Arm/Elbow/Hand WDL  (L) Arm/Elbow/Hand WDL  Abdomen Scar and Incision old incision  Groin WDL  Scrotum/Coccyx/Buttocks WDL  (R) Leg WDL  (L) Leg Incision to left knee  (R) Heel/Foot/Toe WDL  (L) Heel/Foot/Toe WDL          Devices In Places Blood Pressure Cuff, Pulse Ox, and Nasal Cannula      Interventions In Place Pillows and Pressure Redistribution Mattress    Possible Skin Injury No    Pictures Uploaded Into Epic N/A  Wound Consult Placed N/A  RN Wound Prevention Protocol Ordered No

## 2023-08-15 NOTE — ANESTHESIA TIME REPORT
Anesthesia Start and Stop Event Times     Date Time Event    8/15/2023 1111 Ready for Procedure     1133 Anesthesia Start     1243 Anesthesia Stop        Responsible Staff  08/15/23    Name Role Begin End    Alessio Black M.D. Anesth 1133 1243        Overtime Reason:  no overtime (within assigned shift)    Comments:

## 2023-08-15 NOTE — ANESTHESIA PREPROCEDURE EVALUATION
Case: [id number] Date/Time: 08/15/23 1115    Procedure: ARTHROPLASTY, KNEE, TOTAL (Left)    Diagnosis: Primary osteoarthritis of left knee [M17.12]    Pre-op diagnosis: Primary osteoarthritis of left knee [M17.12]    Location:  OR 04 / SURGERY [facility]    Surgeons: Phoenix John M.D.          Relevant Problems   CARDIAC   (positive) CAD s/p MVPCI   (positive) Essential hypertension   (positive) Nonrheumatic aortic valve insufficiency         (positive) CKD (chronic kidney disease) stage 3, GFR 30-59 ml/min ([facility])       Physical Exam    Airway   Mallampati: II  TM distance: >3 FB  Neck ROM: full       Cardiovascular - normal exam  Rhythm: regular  Rate: normal  (-) murmur     Dental - normal exam           Pulmonary - normal exam  Breath sounds clear to auscultation     Abdominal    Neurological - normal exam                 Anesthesia Plan    ASA 3   ASA physical status 3 criteria: CAD/stents (> 3 months)    Plan - general and peripheral nerve block     Peripheral nerve block will be post-op pain control  Airway plan will be LMA          Induction: intravenous    Postoperative Plan: Postoperative administration of opioids is intended.    Pertinent diagnostic labs and testing reviewed    Informed Consent:    Anesthetic plan and risks discussed with patient.    Use of blood products discussed with: patient whom consented to blood products.

## 2023-08-15 NOTE — THERAPY
Physical Therapy   Initial Evaluation     Patient Name: Arcenio Barnett  Age:  83 y.o., Sex:  male  Medical Record #: [id number]  Today's Date: 8/15/2023     Precautions  Precautions: Weight Bearing As Tolerated Left Lower Extremity    Assessment  Patient is 83 y.o. male s/p L TKA POD#0. Pt is presenting with expected post-op weakness and pain L LE however pt is also moving slowly, requiring close CGA for safety, mildly unsteady on feet. Recommend pt stay the night and will have additional PT in am prior to DC home.      Plan    Physical Therapy Initial Treatment Plan   Treatment Plan : Bed Mobility, Gait Training, Neuro Re-Education / Balance, Therapeutic Activities, Therapeutic Exercise, Stair Training  Treatment Frequency: Daily  Duration: Until Therapy Goals Met    DC Equipment Recommendations: Unable to determine at this time  Discharge Recommendations: Recommend outpatient physical therapy services to address higher level deficits        08/15/23 1604   Prior Living Situation   Housing / Facility Mobile Home   Steps Into Home 4   Steps In Home 0   Rail Left Rail  (Steps into Home)   Bathroom Set up Walk In Shower;Bathtub / Shower Combination   Equipment Owned Front-Wheel Walker;Single Point Cane;Tub / Shower Seat;Raised Toilet Seat Without Arms   Lives with - Patient's Self Care Capacity Spouse   Comments supportive spouse   Prior Level of Functional Mobility   Bed Mobility Independent   Transfer Status Independent   Ambulation Independent   Assistive Devices Used Single Point Cane   Stairs Independent   Cognition    Cognition / Consciousness WDL   Passive ROM Lower Body   Passive ROM Lower Body Not Tested   Active ROM Lower Body    Active ROM Lower Body  X   Comments L knee 0-75 deg   Strength Lower Body   Lower Body Strength  X   Comments L knee limited by pain   Sensation Lower Body   Lower Extremity Sensation   WDL   Balance Assessment   Sitting Balance (Static) Fair +   Sitting Balance (Dynamic) Fair    Standing Balance (Static) Fair   Standing Balance (Dynamic) Fair   Weight Shift Sitting Fair   Weight Shift Standing Fair   Comments stdg with FWW   Bed Mobility    Supine to Sit Supervised   Sit to Supine Supervised   Gait Analysis   Gait Level Of Assist Contact Guard Assist   Assistive Device Front Wheel Walker   Distance (Feet) 50   # of Times Distance was Traveled 1   Deviation Shuffled Gait;Antalgic;Step To   Weight Bearing Status WBAT L LE   Functional Mobility   Sit to Stand Contact Guard Assist   Bed, Chair, Wheelchair Transfer Contact Guard Assist   Activity Tolerance   Standing 5 min   Short Term Goals    Short Term Goal # 1 Pt will be able to perform sit <> stand and transfer with FWW Víctor in 6 visits.   Short Term Goal # 2 Pt will be able to ambulate 150 ft with FWW Víctor in 6 visits.   Short Term Goal # 3 Pt will be able to go up/down 4 steps with rail CGA in 6 visits.

## 2023-08-15 NOTE — H&P
Surgery Orthopedic History & Physical Note    Date  8/15/2023    Primary Care Physician  Mirna Charlton D.O.      Pre-Op Diagnosis Codes:     * Primary osteoarthritis of left knee [M17.12]    HPI  This is a 83 y.o. male who presents for a TKA.    Past Medical History:   Diagnosis Date    Allergy     Arm fracture, left 02/22/2022    Arthritis 01/04/2022    RA hands/knees    BPH (benign prostatic hyperplasia)     CAD (coronary artery disease)     cardiac stents x 2 2000, 2007 [facility] 2007 prox LAD    Cholelithiasis     CKD (chronic kidney disease) stage 3, GFR 30-59 ml/min ([facility])     Per Problem List    Congestive heart failure ([facility]) 2016 7/31/23-no edema. Follows [facility] Dr [facility] with [facility].    Dental disorder     missing teeth    Fall 02/22/2022    Left Arm Fracture    Fracture     History of bladder infections     History of coronary artery stent placement 08/01/2014    Prox LAD, also RCA    Hyperlipidemia     Hypertension 01/04/2022    medicated    Knee pain, bilateral 07/31/2023    left worse than right    Myocardial infarct ([facility]) 2007    2 stents placed    Pre-diabetes 03/01/2021    Prediabetes     Per Problem List    Statin intolerance 06/26/2018    Umbilical hernia     no issues    Urinary incontinence 01/04/2022    resolved after prostate procedure    Vertigo        Past Surgical History:   Procedure Laterality Date    HUMERUS NAILING INTRAMEDULLARY N/A 03/05/2022    Procedure: LEFT HUMERAL INTRAMEDILLARY NAILING; LEFT ROTATOR CUFF REPAIR;  Surgeon: Vishal Louis M.D.;  Location: SURGERY [facility];  Service: Orthopedics    ID LASER VAPORIZATION SURGERY PROSTATE, COMP*  01/12/2022    Procedure: ELECTROVAPORIZATION, PROSTATE, TRANSURETHRAL;  Surgeon: Jamaal Liz M.D.;  Location: SURGERY [facility];  Service: Urology    CHOLECYSTECTOMY  2014    OTHER CARDIAC SURGERY      Stents x2       Current Facility-Administered Medications   Medication Dose Route Frequency  Provider Last Rate Last Admin    ceFAZolin (Ancef) injection 2 g  2 g Intravenous Once Dwayne Lombardo P.A.-C.        lidocaine (Xylocaine) 1 % injection 0.5 mL  0.5 mL Intradermal Once PRN Phoenix John M.D.        lactated ringers infusion   Intravenous Continuous Phoenix John M.D.           Social History     Socioeconomic History    Marital status:      Spouse name: Not on file    Number of children: Not on file    Years of education: Not on file    Highest education level: Not on file   Occupational History    Not on file   Tobacco Use    Smoking status: Never    Smokeless tobacco: Never    Tobacco comments:     continued abstinence   Vaping Use    Vaping Use: Never used   Substance and Sexual Activity    Alcohol use: Not Currently     Comment: maybe a beer every 2 weeks    Drug use: Yes     Types: Marijuana, Inhaled     Comment: Occasional Marijuana use    Sexual activity: Never     Partners: Female   Other Topics Concern    Not on file   Social History Narrative    He is  to [place]. He relocated to [place] around 2015 from a small town in [place]. He is dissatisfied with living in [place] and [place]     Social Determinants of Health     Financial Resource Strain: Not on file   Food Insecurity: Not on file   Transportation Needs: Not on file   Physical Activity: Not on file   Stress: Not on file   Social Connections: Not on file   Intimate Partner Violence: Not on file   Housing Stability: Not on file       Family History   Problem Relation Age of Onset    Cancer Father 57        lung cancer    Cancer Brother 52        lung cancer    Cancer Sister         breast    Cancer Sister         breast    Cancer Sister         pancreatic    Hyperlipidemia Mother     Stroke Mother        Allergies  Atorvastatin and Statins [hmg-coa-r inhibitors]    Review of Systems  Negative    Physical Exam  Regular rate and rhythm  Nonlabored breathing  Abdomen soft and nontender    Neurovascular intact distally  Skin is in good condition    Vital Signs  Blood Pressure : (!) 172/80   Temperature: 35.9 °C (96.7 °F)   Pulse: (!) 50   Respiration: 18   Pulse Oximetry: 98 %       Labs:                    Radiology:  No orders to display         Assessment/Plan:  Pre-Op Diagnosis Codes:     * Primary osteoarthritis of left knee [M17.12]  Procedure(s):  ARTHROPLASTY, KNEE, TOTAL

## 2023-08-15 NOTE — PROGRESS NOTES
1510 - Received report and patient arrived to the floor. Assumed care of patient. Discussed daily plan of care, oriented pt to floor. Med rec and admission profile completed. Hourly rounding in place.    1610 - patient is walking with the PT  1735 - patient took medication and no problem with swallowing   1800 - patient is awake and eating

## 2023-08-15 NOTE — OR NURSING
1305 - Report received from Michoacano BELTRÁN. Pt resting comfortably, VSS    1315 - Pt resting comfortably, arousable to voice, nods no to pain, falls asleep quickly, VSS    1330 - Pt complaining of pain, falls asleep quickly, VSS    1335 - Report to Michoacano BELTRÁN

## 2023-08-15 NOTE — CARE PLAN
The patient is Stable - Low risk of patient condition declining or worsening    Shift Goals  Clinical Goals: patient will be able to pee, and walk 50ft within the shift  Patient Goals: comfort and safety with ambulation  Family Goals: comfort and safety of patient when ambulating    Progress made toward(s) clinical / shift goals:  pt was able to ambulate safely. Pt tried to pee but still no urge.     Patient is not progressing towards the following goals: n/a

## 2023-08-15 NOTE — LETTER
June 8, 2023    Patient Name: Arcenio Barnett  Surgeon Name: Phoenix John M.D.  [facility] [facility] [address] [facility]  Surgery Date: 8/15/2023    The time of your surgery is not final and may change up to and until the day of your surgery. You will be contacted 24-48 hours prior to your surgery date with your check-in and surgery time.    If you will not be at one of the below numbers please call the surgery scheduler at [phone]  Preferred Phone: [phone]    BEFORE YOUR SURGERY   Pre Registration and/or Lab Work must be done within and no earlier than 28 days prior to your surgery date. Please call [facility] at [phone] for an appointment as soon as possible.    DAY OF YOUR SURGERY  Nothing to eat or drink after midnight     Refrain from smoking any substance after midnight prior to surgery. Smoking may interfere with the anesthetic and frequently produces nausea during the recovery period.    Continue taking all lifesaving medications. Including the morning of your surgery with small sip of water.    Please do NOT take on the day of surgery:  Diuretics: examples- furosemide (Lasix), spironolactone, hydrochlorothiazide  ACE-inhibitors: examples- lisinopril, ramipril, enalapril  “ARBs”: examples- losartan, Olmesartan, valsartan    Please arrive at the hospital/surgery center at the check-in time provided.     An adult will need to bring you and take you home after your surgery.     AFTER YOUR SURGERY  Post op Appointment:   Date: 08/30/2023   Time: 10:00AM   With: Dwayne Lombardo PA-C   Location: [address] [place] [address]    - Therapy- Your first appointment should be 5-7  day(s) after your surgery. For your convenience we have 4 Physical Therapy locations: [facility], [place], and [facility]. Call our office ASAP to schedule an appointment at [phone] or take the enclosed Therapy Prescription to a  facility of your choice.  - No dental work for 3-6 months after your surgery.  - Post Surgery - You will need someone to drive you home  - Post Surgery - You will need someone to stay with you for 24 hours  - You must have someone provide transportation post surgery and someone to monitor you for at least 24 hours post-surgery. If you don't have either of these your appointment will be canceled.     TIME OFF WORK  FMLA or Disability forms can be faxed directly to: [phone] or you may drop them off at [address] [place] [address]. Our office charges a $35.00 fee per form. Forms will be completed within 10 business days of drop off and payment received. For the status of your forms you may contact our disability office directly [phone].    MEDICATION INSTRUCTIONS **Please read section completely**    The following medications should be stopped a minimum of 10 days prior to surgery:  All over the counter, Supplements & Herbal medications    Anorectics: Phentermine (Adipex-P, Lomaira and Suprenza), Phentermine-topiramate (Qsymia), Bupropion-naltrexone (Contrave)    Opiod Partial Agonists/Opioid Antagonists: Buprenorphine (Subocone, Belbuca, Butrans, Probuphine Implant, Sublocade), Naltrexone (ReVia, Vivitrol), Naloxone    Amphetamines: Dextroamphetamine/Amphetamine (Adderall, Mydayis), Methylphenidate Hydrochloride (Concerta, Metadate, Methylin, Ritalin)    The following medications should be stopped 5 days prior to surgery:  Blood Thinners: Any Aspirin, Aspirin products, anti-inflammatories such as ibuprofen and any blood thinners such as Coumadin and Plavix. Please consult your prescribing physician if you are on life saving blood thinners, in regards to when to stop medications prior to surgery.     The following medications should be stopped a minimum of 3 days prior to surgery:  PDE-5 inhibitors: Sildenafil (Viagra), Tadalafil (Cialis), Vardenafil (Levitra), Avanafil (Stendra)    MAO  Inhibitors: Rasagiline (Azilect), Selegiline (Eldepryl, Emsam, Selapar), Isocarboxazid (Marplan), Phenelzine (Nardil)

## 2023-08-16 VITALS
TEMPERATURE: 98.6 F | WEIGHT: 145.5 LBS | OXYGEN SATURATION: 89 % | RESPIRATION RATE: 16 BRPM | HEART RATE: 65 BPM | BODY MASS INDEX: 23.38 KG/M2 | DIASTOLIC BLOOD PRESSURE: 60 MMHG | SYSTOLIC BLOOD PRESSURE: 130 MMHG | HEIGHT: 66 IN

## 2023-08-16 LAB
HCT VFR BLD AUTO: 38.4 % (ref 42–52)
HGB BLD-MCNC: 12.9 G/DL (ref 14–18)

## 2023-08-16 PROCEDURE — 85018 HEMOGLOBIN: CPT

## 2023-08-16 PROCEDURE — 700102 HCHG RX REV CODE 250 W/ 637 OVERRIDE(OP): Performed by: STUDENT IN AN ORGANIZED HEALTH CARE EDUCATION/TRAINING PROGRAM

## 2023-08-16 PROCEDURE — 94760 N-INVAS EAR/PLS OXIMETRY 1: CPT

## 2023-08-16 PROCEDURE — 97530 THERAPEUTIC ACTIVITIES: CPT

## 2023-08-16 PROCEDURE — 85014 HEMATOCRIT: CPT

## 2023-08-16 PROCEDURE — A9270 NON-COVERED ITEM OR SERVICE: HCPCS | Performed by: STUDENT IN AN ORGANIZED HEALTH CARE EDUCATION/TRAINING PROGRAM

## 2023-08-16 PROCEDURE — 700111 HCHG RX REV CODE 636 W/ 250 OVERRIDE (IP): Performed by: STUDENT IN AN ORGANIZED HEALTH CARE EDUCATION/TRAINING PROGRAM

## 2023-08-16 PROCEDURE — 97116 GAIT TRAINING THERAPY: CPT

## 2023-08-16 PROCEDURE — 96376 TX/PRO/DX INJ SAME DRUG ADON: CPT

## 2023-08-16 PROCEDURE — G0378 HOSPITAL OBSERVATION PER HR: HCPCS

## 2023-08-16 PROCEDURE — 99024 POSTOP FOLLOW-UP VISIT: CPT | Performed by: ORTHOPAEDIC SURGERY

## 2023-08-16 PROCEDURE — 36415 COLL VENOUS BLD VENIPUNCTURE: CPT

## 2023-08-16 RX ADMIN — ASPIRIN 81 MG: 81 TABLET, COATED ORAL at 05:33

## 2023-08-16 RX ADMIN — ACETAMINOPHEN 1000 MG: 500 TABLET ORAL at 05:32

## 2023-08-16 RX ADMIN — KETOROLAC TROMETHAMINE 15 MG: 30 INJECTION, SOLUTION INTRAMUSCULAR; INTRAVENOUS at 05:31

## 2023-08-16 ASSESSMENT — COGNITIVE AND FUNCTIONAL STATUS - GENERAL
TURNING FROM BACK TO SIDE WHILE IN FLAT BAD: A LITTLE
CLIMB 3 TO 5 STEPS WITH RAILING: A LITTLE
STANDING UP FROM CHAIR USING ARMS: A LITTLE
SUGGESTED CMS G CODE MODIFIER MOBILITY: CK
MOVING TO AND FROM BED TO CHAIR: A LITTLE
WALKING IN HOSPITAL ROOM: A LITTLE
MOBILITY SCORE: 18
MOVING FROM LYING ON BACK TO SITTING ON SIDE OF FLAT BED: A LITTLE

## 2023-08-16 ASSESSMENT — PAIN SCALES - GENERAL: PAIN_LEVEL: 0

## 2023-08-16 ASSESSMENT — PAIN DESCRIPTION - PAIN TYPE
TYPE: SURGICAL PAIN

## 2023-08-16 ASSESSMENT — GAIT ASSESSMENTS
DISTANCE (FEET): 100
DEVIATION: DECREASED HEEL STRIKE;DECREASED TOE OFF
ASSISTIVE DEVICE: FRONT WHEEL WALKER
GAIT LEVEL OF ASSIST: STANDBY ASSIST

## 2023-08-16 NOTE — PROGRESS NOTES
Received bedside patient report from JEREL Castano. Patient resting comfortably in bed, no complaints at this time. Safety precautions in place. Will continue to monitor.

## 2023-08-16 NOTE — ANESTHESIA POSTPROCEDURE EVALUATION
Patient: Arcenio Barnett    Procedure Summary     Date: 08/15/23 Room / Location:  OR 04 / SURGERY [facility]    Anesthesia Start: 1133 Anesthesia Stop: 1243    Procedures:       ARTHROPLASTY, KNEE, TOTAL (Left: Knee)      RIGHT KNEE INJECTION (Right: Knee) Diagnosis:       Primary osteoarthritis of left knee      (Primary osteoarthritis of left knee )    Surgeons: Phoenix John M.D. Responsible Provider: Alessio Black M.D.    Anesthesia Type: general, peripheral nerve block ASA Status: 3          Final Anesthesia Type: general, peripheral nerve block  Last vitals  BP   Blood Pressure : 91/58    Temp   36.9 °C (98.5 °F)    Pulse   72   Resp   15    SpO2   92 %      Anesthesia Post Evaluation    Patient location during evaluation: PACU  Patient participation: complete - patient participated  Level of consciousness: awake and alert  Pain score: 0    Airway patency: patent  Anesthetic complications: no  Cardiovascular status: hemodynamically stable  Respiratory status: acceptable  Hydration status: euvolemic    PONV: none          No notable events documented.     Nurse Pain Score: 0 (NPRS)

## 2023-08-16 NOTE — CARE PLAN
The patient is Stable - Low risk of patient condition declining or worsening    Shift Goals  Clinical Goals: Pain control 3/10, Rest and sleep at least 4 hours, no falls or injuries  Patient Goals: Pain control 3/10, Rest and sleep at least 4 hours, no falls or injuries  Family Goals: comfort and safety of patient when ambulating    Progress made toward(s) clinical / shift goals:  Pain controlled at 3/10 or less, rested and slept for at least 4 hours, no falls or injuries    Patient is not progressing towards the following goals:

## 2023-08-16 NOTE — PROGRESS NOTES
Introduced myself. Updated board. Patient awake and alert.no needs at this time.  Vitals taken. Removed tray. Patient resting.

## 2023-08-16 NOTE — THERAPY
Physical Therapy   Daily Treatment     Patient Name: Arcenio Barnett  Age:  83 y.o., Sex:  male  Medical Record #: [id number]  Today's Date: 8/16/2023     Precautions  Precautions: Weight Bearing As Tolerated Left Lower Extremity    Assessment    Pt with improved activityt olerance and balance when amb with FWW. Spouse present for PT session. Pt was able to ambulate safely with FWW, stair training completed. Pt was provided with education on elevation, icing, positioning, and supine/seated therapeutic exercises. HEP handout issued, pt able to return demo all exercises.  Pt has support at home and has no further acute skilled PT needs at this time. Anticipate pt to d/c home once medically clear with recs for FWW use and OP therapy services.      Plan    Treatment Plan Status:  DC PT, pt to DC home today with spouse assist    DC Equipment Recommendations: None  Discharge Recommendations: Recommend outpatient physical therapy services to address higher level deficits       08/16/23 1048   Cognition    Level of Consciousness Alert   Comments Pt reports feeling well, spouse present for treatment   Supine Lower Body Exercise   Short Arc Quad 1 set of 10;Left   Heel Slide 1 set of 10;Left   Ankle Pumps 1 set of 10;Reciprocal   Quadriceps Isometrics 1 set of 10;Left   Sitting Lower Body Exercises   Comments seated knee flex x10 reps   Home Exercise Program   Home Exercise Program Patient Able to Complete Home Program Independently, Safely   Balance   Sitting Balance (Static) Good   Sitting Balance (Dynamic) Fair +   Standing Balance (Static) Fair +   Standing Balance (Dynamic) Fair   Weight Shift Sitting Good   Weight Shift Standing Fair   Skilled Intervention Postural Facilitation;Sequencing;Tactile Cuing;Verbal Cuing   Comments stdg with FWW   Bed Mobility    Supine to Sit Supervised   Sit to Supine Supervised   Gait Analysis   Gait Level Of Assist Standby Assist   Assistive Device Front Wheel Walker   Distance (Feet) 100   #  of Times Distance was Traveled 1   Deviation Decreased Heel Strike;Decreased Toe Off   # of Stairs Climbed 1   Level of Assist with Stairs Contact Guard Assist   Weight Bearing Status WBAT L LE   Functional Mobility   Sit to Stand Standby Assist   Bed, Chair, Wheelchair Transfer Standby Assist   Short Term Goals    Short Term Goal # 1 Pt will be able to perform sit <> stand and transfer with FWW Víctor in 6 visits.   Goal Outcome # 1 Progressing as expected   Short Term Goal # 2 Pt will be able to ambulate 150 ft with FWW Víctor in 6 visits.   Goal Outcome # 2 Progressing as expected   Short Term Goal # 3 Pt will be able to go up/down 4 steps with rail CGA in 6 visits.   Goal Outcome # 3 Progressing as expected

## 2023-08-16 NOTE — PROGRESS NOTES
"Subjective:    No acute events.  Pain reasonably controlled.  Mobilizing well.    Objective:  /47   Pulse (!) 54   Temp 36.9 °C (98.5 °F) (Temporal)   Resp 15   Ht 1.676 m (5' 5.98\")   Wt 66 kg (145 lb 8.1 oz)   SpO2 92%     Recent Labs     08/16/23  0313   HEMOGLOBIN 12.9*   HEMATOCRIT 38.4*             Intake/Output Summary (Last 24 hours) at 8/16/2023 0715  Last data filed at 8/15/2023 2130  Gross per 24 hour   Intake 1075 ml   Output 280 ml   Net 795 ml       Comfortable, no distress  Neurologically and vascularly intact with palpable pedal pulses bilaterally.  Dressing C/D/I      Assessment:  Doing well s/p total knee arthroplasty.    Plan:    Diet as tolerated  WBAT  PT/OT  Reviewed knee ROM including terminal flexion and extension exercises  DVT ppx - ASA BID + Sequential Compression Devices  Plan to discharge home  Follow-up in approximately 2 weeks post-op.  [phone]    "

## 2023-08-16 NOTE — DISCHARGE SUMMARY
Patient was admitted for a total knee arthroplasty.  There were no complications during the surgery. Did well post-operatively.         Recent Labs     08/16/23  0313   HEMOGLOBIN 12.9*   HEMATOCRIT 38.4*       Active Hospital Problems    Diagnosis     Arthritis of left knee [M17.12]     Osteoarthritis, knee [M17.9]      Added automatically from request for surgery [id number]        Primary osteoarthritis of left knee [M17.12]        Uneventful hospital course.     Medication List        START taking these medications        Instructions   acetaminophen 500 MG Tabs  Commonly known as: Tylenol   Take 2 Tablets by mouth 3 times a day as needed for Mild Pain or Moderate Pain for up to 30 days.  Dose: 1,000 mg     docusate sodium 100 MG Caps  Commonly known as: Colace   Take 1 Capsule by mouth 2 times a day for 30 days.  Dose: 100 mg     meloxicam 7.5 MG Tabs  Commonly known as: Mobic   Take 1 Tablet by mouth every day for 30 days.  Dose: 7.5 mg            CHANGE how you take these medications        Instructions   * aspirin 81 MG Chew chewable tablet  What changed: Another medication with the same name was added. Make sure you understand how and when to take each.  Commonly known as: Asa   Chew 81 mg every day.  Dose: 81 mg     * aspirin 81 MG Chew chewable tablet  What changed: You were already taking a medication with the same name, and this prescription was added. Make sure you understand how and when to take each.  Commonly known as: Asa   Chew 1 Tablet 2 times a day for 28 days.  Dose: 81 mg     * aspirin 81 MG Chew chewable tablet  What changed: You were already taking a medication with the same name, and this prescription was added. Make sure you understand how and when to take each.  Commonly known as: Asa   Chew 1 Tablet 2 times a day for 28 days.  Dose: 81 mg           * This list has 3 medication(s) that are the same as other medications prescribed for you. Read the directions carefully, and ask your doctor or  other care provider to review them with you.                CONTINUE taking these medications        Instructions   carvedilol 12.5 MG Tabs  Commonly known as: Coreg   TAKE 1 TABLET BY MOUTH TWICE DAILY WITH MEALS     CENTRUM SILVER 50+MEN PO   Take 1 Tablet by mouth every day.  Dose: 1 Tablet     ezetimibe 10 MG Tabs  Commonly known as: Zetia   Take 1 Tablet by mouth every day.  Dose: 10 mg     ibuprofen 200 MG Tabs  Commonly known as: Motrin   Take 400 mg by mouth every 6 hours as needed.  Dose: 400 mg            ASK your doctor about these medications        Instructions   oxyCODONE immediate-release 5 MG Tabs  Commonly known as: Roxicodone  Ask about: Should I take this medication?   Take 1-2 Tablets by mouth every four hours as needed for Severe Pain for up to 7 days.  Dose: 5-10 mg     traMADol 50 MG Tabs  Commonly known as: Ultram  Ask about: Should I take this medication?   Take 1-2 Tablets by mouth every 6 hours as needed for Mild Pain or Moderate Pain for up to 7 days.  Dose:  mg

## 2023-08-16 NOTE — CARE PLAN
The patient is Stable - Low risk of patient condition declining or worsening    Shift Goals  Clinical Goals: Patient will report 3/10 pain level or less throughout the shift  Patient Goals: will go home today  Family Goals: comfort and safety of patient when ambulating    Progress made toward(s) clinical / shift goals:  Patient denies any pain. PT is working with the patient right now    Patient is not progressing towards the following goals:    Problem: Pain - Standard  Goal: Alleviation of pain or a reduction in pain to the patient’s comfort goal  Outcome: Progressing     Problem: Post-Operative Knee Replacement  Goal: Patient will demonstrate understanding of knee replacement post-surgical weight bearing restrictions and DME usage during hospital stay and post discharge  Outcome: Progressing  Goal: Patient's neurovascular status will be maintained or improve  Outcome: Progressing  Goal: Early mobilization post surgery  Outcome: Progressing     Problem: Pain - Post Surgery  Goal: Alleviation or reduction of pain post surgery  Outcome: Progressing     Problem: Incision Care  Goal: Optimal post surgical incision care  Outcome: Progressing     Problem: Venous Thromboembolism (VTE) Prevention  Goal: The patient will remain free from venous thromboembolism (VTE)  Outcome: Progressing

## 2023-08-16 NOTE — DISCHARGE PLANNING
HTH/ [facility] TCN chart review completed. Per chart patient has low LACE 11 and and while 6 click score are 17, PT is recommending home with OP PT, as patient able to ambulated 50 feet with CGA.  Also noted that RN CM notes patient owns FWW and has support from spouse.  Additionally per chart, patient is on RA.  Anticipate no TCN needs in patient discharge planning at this time.      Should post acute discharge needs arise warranting TCN involvement, please contact TCN team via Voalte. Thank you.

## 2023-08-16 NOTE — PROGRESS NOTES
Discharge paper work reviewed with patient and wife at bedside.Copy given.Questions encouraged and aswered. I.V removed. DC to home    1104- Patient escorted to ED entrance via wheelchair.Patient discharge to home.

## 2023-08-16 NOTE — PROGRESS NOTES
Received patient from Night shift RN . Patient is awake and alert.No sign of distress. Patient denies any n/v or pain. Dressing to left knee noted, cdi with ice polar in place. Fall precautions observe, kept bed in lowest position and call light within reach.     0754- BP checked, borderline. Scheduled coreg not given   Patient is comfortably eating his breakfast, sitting at the edge of the bed    0900- patient is comfortably sleeping    1000- assisted patient up to bathroom with the use of FWW, voided 1 time.

## 2023-10-17 ENCOUNTER — OFFICE VISIT (OUTPATIENT)
Dept: MEDICAL GROUP | Facility: PHYSICIAN GROUP | Age: 83
End: 2023-10-17
Payer: MEDICARE

## 2023-10-17 VITALS
WEIGHT: 142.4 LBS | TEMPERATURE: 96.7 F | OXYGEN SATURATION: 94 % | SYSTOLIC BLOOD PRESSURE: 124 MMHG | RESPIRATION RATE: 12 BRPM | HEART RATE: 54 BPM | DIASTOLIC BLOOD PRESSURE: 70 MMHG | HEIGHT: 66 IN | BODY MASS INDEX: 22.88 KG/M2

## 2023-10-17 DIAGNOSIS — R73.03 PREDIABETES: ICD-10-CM

## 2023-10-17 DIAGNOSIS — M17.12 PRIMARY OSTEOARTHRITIS OF LEFT KNEE: ICD-10-CM

## 2023-10-17 DIAGNOSIS — F32.5 MAJOR DEPRESSIVE DISORDER WITH SINGLE EPISODE, IN FULL REMISSION (HCC): ICD-10-CM

## 2023-10-17 DIAGNOSIS — E78.5 DYSLIPIDEMIA: ICD-10-CM

## 2023-10-17 DIAGNOSIS — J34.89 LESION OF NOSE: ICD-10-CM

## 2023-10-17 DIAGNOSIS — Z23 NEED FOR VACCINATION: ICD-10-CM

## 2023-10-17 DIAGNOSIS — D69.6 THROMBOCYTOPENIA (HCC): ICD-10-CM

## 2023-10-17 PROBLEM — M17.9 OSTEOARTHRITIS, KNEE: Status: RESOLVED | Noted: 2023-05-08 | Resolved: 2023-10-17

## 2023-10-17 PROCEDURE — G0008 ADMIN INFLUENZA VIRUS VAC: HCPCS | Performed by: INTERNAL MEDICINE

## 2023-10-17 PROCEDURE — 3078F DIAST BP <80 MM HG: CPT | Performed by: INTERNAL MEDICINE

## 2023-10-17 PROCEDURE — 3074F SYST BP LT 130 MM HG: CPT | Performed by: INTERNAL MEDICINE

## 2023-10-17 PROCEDURE — 90662 IIV NO PRSV INCREASED AG IM: CPT | Performed by: INTERNAL MEDICINE

## 2023-10-17 PROCEDURE — 99214 OFFICE O/P EST MOD 30 MIN: CPT | Mod: 25 | Performed by: INTERNAL MEDICINE

## 2023-10-17 ASSESSMENT — FIBROSIS 4 INDEX: FIB4 SCORE: 3.08

## 2023-10-17 NOTE — ASSESSMENT & PLAN NOTE
Chronic ongoing problem, now taking Zetia 10 mg daily.  Had avoided following up with lipid clinic when he was dealing with worsening knee pain and now agreeable, encouraged him to call make an appointment so he can get back on Repatha.  Also encouraged him to call cardiology to make annual follow-up which will be due around January 2024.

## 2023-10-17 NOTE — PROGRESS NOTES
Subjective:   Chief Complaint/History of Present Illness:  Arcenio Barnett is a 83 y.o. male established patient who presents today to discuss medical problems as listed below. Arcenio is is accompanied by his partner for today's visit.    Problem   Thrombocytopenia (Hcc)     Latest Reference Range & Units 08/02/23 10:34   Platelet Count 164 - 446 K/uL 155 (L)     Mild thrombocytopenia on preoperative lab work before his knee arthroplasty.  No bruising or bleeding issues.     Lesion of Nose    He has a dark lesion on the right nasal bridge.  He thinks he has had this for years.  I do not recall seeing it but have only seen him in a mask in the past 3 years.  They are unsure if it is getting darker bigger.  Agreeable to dermatology referral for full skin check.     Primary osteoarthritis of left knee s/p TKA    He has longstanding history of severe left greater than right knee pain.  He had hesitated to pursue surgery but finally acquiesced this fall and had left total knee arthroplasty with Dr. John at [facility] in August 2023.  He reports essentially no postoperative pain and has had a life-changing improvement with his mobility since surgery.  He plans to have the right side done next spring.     Major Depressive Disorder With Single Episode, in Full Remission (Hcc)    In 2022 he had gone through several medical challenges recently including prostate surgery and then a ground-level fall which led to a humerus fracture and required operative repair.  This was worsened with progression of knee pain as well.  He is now recovered from prostate surgery and had a left total knee arthroplasty and is doing much better with improvement of his mood.     Prediabetes     Latest Reference Range & Units 08/02/23 10:34   Glycohemoglobin 4.0 - 5.6 % 6.2 (H)   Estim. Avg Glu mg/dL 131     He had mild glucose intolerance on previous evaluations.  No use of pharmacotherapy to lower blood sugar in the past.  One of his children has type 2  "diabetes requiring insulin.     Dyslipidemia     Latest Reference Range & Units 04/05/23 10:02   Cholesterol,Tot 100 - 199 mg/dL 224 (H)   Triglycerides 0 - 149 mg/dL 86   HDL >=40 mg/dL 66   LDL <100 mg/dL 141 (H)     Patient has a history of hyperlipidemia and associated coronary artery disease.  He was previously on statin therapy however this was discontinued due to myalgias and arthralgias.  Cardiology had mentioned in a previous note initiated him on PSCK9 inhibitor therapy, but does not look like this has happened yet. He is on a baby aspirin daily. We did complete paperwork for Repatha but he elected to stop taking it. Agreeable to going back on ezetimibe 10 mg daily.       Osteoarthritis, Knee (Resolved)    Added automatically from request for surgery [id number]          Current Medications:  Current Outpatient Medications Ordered in Epic   Medication Sig Dispense Refill    aspirin (ASA) 81 MG Chew Tab chewable tablet Chew 81 mg every day.      ibuprofen (MOTRIN) 200 MG Tab Take 400 mg by mouth every 6 hours as needed.      Multiple Vitamins-Minerals (CENTRUM SILVER 50+MEN PO) Take 1 Tablet by mouth every day.      ezetimibe (ZETIA) 10 MG Tab Take 1 Tablet by mouth every day. 90 Tablet 3    carvedilol (COREG) 12.5 MG Tab TAKE 1 TABLET BY MOUTH TWICE DAILY WITH MEALS 200 Tablet 3     No current Epic-ordered facility-administered medications on file.          Objective:   Physical Exam:    Vitals: /70 (BP Location: Right arm, Patient Position: Sitting, BP Cuff Size: Adult)   Pulse (!) 54   Temp 35.9 °C (96.7 °F) (Temporal)   Resp 12   Ht 1.676 m (5' 6\")   Wt 64.6 kg (142 lb 6.4 oz)   SpO2 94%    BMI: Body mass index is 22.98 kg/m².  Physical Exam  Constitutional:       General: He is not in acute distress.     Appearance: Normal appearance. He is not ill-appearing.   HENT:      Right Ear: Ear canal and external ear normal. There is no impacted cerumen.      Left Ear: Ear canal and external ear normal. " There is no impacted cerumen.   Eyes:      General: No scleral icterus.     Conjunctiva/sclera: Conjunctivae normal.   Cardiovascular:      Rate and Rhythm: Normal rate and regular rhythm.      Pulses: Normal pulses.      Heart sounds: No murmur heard.  Pulmonary:      Effort: Pulmonary effort is normal. No respiratory distress.      Breath sounds: Normal breath sounds. No wheezing, rhonchi or rales.   Musculoskeletal:      Right lower leg: No edema.      Left lower leg: No edema.   Lymphadenopathy:      Cervical: No cervical adenopathy.   Skin:     General: Skin is warm and dry.      Findings: No rash.   Neurological:      Gait: Gait normal.   Psychiatric:         Mood and Affect: Mood normal.         Behavior: Behavior normal.         Thought Content: Thought content normal.         Judgment: Judgment normal.               Assessment and Plan:   Arcenio is a 83 y.o. male with the following:  Problem List Items Addressed This Visit       Dyslipidemia     Chronic ongoing problem, now taking Zetia 10 mg daily.  Had avoided following up with lipid clinic when he was dealing with worsening knee pain and now agreeable, encouraged him to call make an appointment so he can get back on Repatha.  Also encouraged him to call cardiology to make annual follow-up which will be due around January 2024.         Lesion of nose     He has a dark lesion on the right nasal bridge.  He thinks he has had this for years.  I do not recall seeing it but have only seen him in a mask in the past 3 years.  They are unsure if it is getting darker bigger.  Agreeable to dermatology referral for full skin check.           Relevant Orders    Referral to Dermatology    Major depressive disorder with single episode, in full remission (HCC)     Chronic improved problem, he is very happy that he decided to pursue left total knee arthroplasty for his pain and plans to have the right side done next year.  Mood is doing well, no indication for medicines  or therapy at this time.         Prediabetes     Chronic decompensated problem.  Had resolved in the past and now recurrence with mild prediabetes.  He admits to dietary discretion with ice cream over the summer months.  We will recheck in a few weeks to ensure improvement.  No medicine indicated at this time.         Relevant Orders    HEMOGLOBIN A1C    Primary osteoarthritis of left knee s/p TKA     He has longstanding history of severe left greater than right knee pain.  He had hesitated to pursue surgery but finally acquiesced this fall and had left total knee arthroplasty with Dr. John at [facility] in August 2023.  He reports essentially no postoperative pain and has had a life-changing improvement with his mobility since surgery.  He plans to have the right side done next spring.         Thrombocytopenia (HCC)     New problem, mild severity, stable.  No bruising or bleeding issues.  Recheck again with annual blood work to ensure stability.          Other Visit Diagnoses       Need for vaccination        Relevant Orders    Influenza Vaccine, High Dose (65+ Only) (Completed)               RTC: Return in about 6 months (around 4/17/2024).    I spent a total of 32 minutes with record review, exam, communication with the patient, communication with other providers, and documentation of this encounter.    PLEASE NOTE: This dictation was created using voice recognition software. I have made every reasonable attempt to correct obvious errors, but I expect that there are errors of grammar and possibly content that I did not discover before finalizing the note.      Mirna Charlton, DO  Geriatric and Internal Medicine  [facility]

## 2023-10-17 NOTE — ASSESSMENT & PLAN NOTE
He has a dark lesion on the right nasal bridge.  He thinks he has had this for years.  I do not recall seeing it but have only seen him in a mask in the past 3 years.  They are unsure if it is getting darker bigger.  Agreeable to dermatology referral for full skin check.

## 2023-10-17 NOTE — ASSESSMENT & PLAN NOTE
He has longstanding history of severe left greater than right knee pain.  He had hesitated to pursue surgery but finally acquiesced this fall and had left total knee arthroplasty with Dr. John at [facility] in August 2023.  He reports essentially no postoperative pain and has had a life-changing improvement with his mobility since surgery.  He plans to have the right side done next spring.

## 2023-10-17 NOTE — ASSESSMENT & PLAN NOTE
Chronic decompensated problem.  Had resolved in the past and now recurrence with mild prediabetes.  He admits to dietary discretion with ice cream over the summer months.  We will recheck in a few weeks to ensure improvement.  No medicine indicated at this time.

## 2023-10-17 NOTE — PATIENT INSTRUCTIONS
Please call pharmacy group to make an appointment to get back in with the lipid clinic to get back on Repatha medicine:  [facility] at [phone]    Make follow up appointment with Dr. Lopez for annual follow up in January 2024    Recheck A1c for blood sugar average anytime after mid November (nonfasting)    Make appointment with dermatology for skin check

## 2023-10-17 NOTE — ASSESSMENT & PLAN NOTE
New problem, mild severity, stable.  No bruising or bleeding issues.  Recheck again with annual blood work to ensure stability.

## 2023-10-17 NOTE — ASSESSMENT & PLAN NOTE
Chronic improved problem, he is very happy that he decided to pursue left total knee arthroplasty for his pain and plans to have the right side done next year.  Mood is doing well, no indication for medicines or therapy at this time.

## 2024-03-06 PROBLEM — M17.9 OSTEOARTHRITIS, KNEE: Status: ACTIVE | Noted: 2024-03-06

## 2024-03-06 ASSESSMENT — FIBROSIS 4 INDEX: FIB4 SCORE: 3.12

## 2024-03-27 PROBLEM — M17.11 PRIMARY OSTEOARTHRITIS OF RIGHT KNEE: Status: ACTIVE | Noted: 2024-03-06

## 2024-04-01 DIAGNOSIS — I25.10 CORONARY ARTERY DISEASE INVOLVING NATIVE CORONARY ARTERY OF NATIVE HEART WITHOUT ANGINA PECTORIS: ICD-10-CM

## 2024-04-01 DIAGNOSIS — E78.5 DYSLIPIDEMIA: ICD-10-CM

## 2024-04-01 DIAGNOSIS — I10 ESSENTIAL HYPERTENSION: ICD-10-CM

## 2024-04-02 RX ORDER — EZETIMIBE 10 MG/1
10 TABLET ORAL DAILY
Qty: 100 TABLET | Refills: 3 | Status: SHIPPED | OUTPATIENT
Start: 2024-04-02

## 2024-04-02 NOTE — TELEPHONE ENCOUNTER
Received request via: Patient    Was the patient seen in the last year in this department? Yes    Does the patient have an active prescription (recently filled or refills available) for medication(s) requested? No    Pharmacy Name: [facility]    Does the patient have longterm Plus and need 100 day supply (blood pressure, diabetes and cholesterol meds only)? Yes, quantity updated to 100 days

## 2024-04-02 NOTE — TELEPHONE ENCOUNTER
Is the patient due for a refill? Yes    Was the patient seen the past year? No    Date of last office visit: 1/12/23    Does the patient have an upcoming appointment?  No    Provider to refill:TW    Does the patients insurance require a 100 day supply?  Yes

## 2024-04-06 RX ORDER — CARVEDILOL 12.5 MG/1
TABLET ORAL
Qty: 200 TABLET | Refills: 0 | Status: SHIPPED | OUTPATIENT
Start: 2024-04-06

## 2024-04-09 ENCOUNTER — HOSPITAL ENCOUNTER (OUTPATIENT)
Dept: RADIOLOGY | Facility: MEDICAL CENTER | Age: 84
End: 2024-04-09
Attending: STUDENT IN AN ORGANIZED HEALTH CARE EDUCATION/TRAINING PROGRAM
Payer: MEDICARE

## 2024-04-09 DIAGNOSIS — M25.561 CHRONIC PAIN OF RIGHT KNEE: ICD-10-CM

## 2024-04-09 DIAGNOSIS — G89.29 CHRONIC PAIN OF RIGHT KNEE: ICD-10-CM

## 2024-04-09 DIAGNOSIS — M17.11 PRIMARY OSTEOARTHRITIS OF RIGHT KNEE: ICD-10-CM

## 2024-04-09 PROCEDURE — 73700 CT LOWER EXTREMITY W/O DYE: CPT | Mod: RT

## 2024-04-17 ENCOUNTER — APPOINTMENT (OUTPATIENT)
Dept: MEDICAL GROUP | Facility: PHYSICIAN GROUP | Age: 84
End: 2024-04-17
Payer: MEDICARE

## 2024-04-17 VITALS
WEIGHT: 136.9 LBS | HEIGHT: 66 IN | TEMPERATURE: 98.3 F | BODY MASS INDEX: 22 KG/M2 | DIASTOLIC BLOOD PRESSURE: 60 MMHG | SYSTOLIC BLOOD PRESSURE: 122 MMHG | OXYGEN SATURATION: 92 % | HEART RATE: 87 BPM | RESPIRATION RATE: 18 BRPM

## 2024-04-17 DIAGNOSIS — D69.6 THROMBOCYTOPENIA (HCC): ICD-10-CM

## 2024-04-17 DIAGNOSIS — E61.1 IRON DEFICIENCY: ICD-10-CM

## 2024-04-17 DIAGNOSIS — G31.9 CEREBRAL ATROPHY, MILD (HCC): ICD-10-CM

## 2024-04-17 DIAGNOSIS — E78.5 DYSLIPIDEMIA: ICD-10-CM

## 2024-04-17 DIAGNOSIS — R63.4 UNINTENTIONAL WEIGHT LOSS: ICD-10-CM

## 2024-04-17 DIAGNOSIS — L30.9 DERMATITIS: ICD-10-CM

## 2024-04-17 DIAGNOSIS — F32.5 MAJOR DEPRESSIVE DISORDER WITH SINGLE EPISODE, IN FULL REMISSION (HCC): ICD-10-CM

## 2024-04-17 DIAGNOSIS — R73.03 PREDIABETES: ICD-10-CM

## 2024-04-17 PROBLEM — N18.2 CKD (CHRONIC KIDNEY DISEASE) STAGE 2, GFR 60-89 ML/MIN: Status: ACTIVE | Noted: 2020-01-28

## 2024-04-17 LAB
HBA1C MFR BLD: 6.1 % (ref ?–5.8)
POCT INT CON NEG: NEGATIVE
POCT INT CON POS: POSITIVE

## 2024-04-17 PROCEDURE — 99214 OFFICE O/P EST MOD 30 MIN: CPT | Performed by: INTERNAL MEDICINE

## 2024-04-17 PROCEDURE — 3074F SYST BP LT 130 MM HG: CPT | Performed by: INTERNAL MEDICINE

## 2024-04-17 PROCEDURE — 3078F DIAST BP <80 MM HG: CPT | Performed by: INTERNAL MEDICINE

## 2024-04-17 PROCEDURE — 83036 HEMOGLOBIN GLYCOSYLATED A1C: CPT | Performed by: INTERNAL MEDICINE

## 2024-04-17 RX ORDER — TRIAMCINOLONE ACETONIDE 1 MG/G
1 CREAM TOPICAL 2 TIMES DAILY
Qty: 45 G | Refills: 1 | Status: SHIPPED | OUTPATIENT
Start: 2024-04-17

## 2024-04-17 ASSESSMENT — PATIENT HEALTH QUESTIONNAIRE - PHQ9
7. TROUBLE CONCENTRATING ON THINGS, SUCH AS READING THE NEWSPAPER OR WATCHING TELEVISION: NOT AT ALL
4. FEELING TIRED OR HAVING LITTLE ENERGY: SEVERAL DAYS
5. POOR APPETITE OR OVEREATING: NOT AT ALL
SUM OF ALL RESPONSES TO PHQ9 QUESTIONS 1 AND 2: 2
3. TROUBLE FALLING OR STAYING ASLEEP OR SLEEPING TOO MUCH: NEARLY EVERY DAY
9. THOUGHTS THAT YOU WOULD BE BETTER OFF DEAD, OR OF HURTING YOURSELF: NOT AT ALL
SUM OF ALL RESPONSES TO PHQ QUESTIONS 1-9: 6
2. FEELING DOWN, DEPRESSED, IRRITABLE, OR HOPELESS: SEVERAL DAYS
1. LITTLE INTEREST OR PLEASURE IN DOING THINGS: SEVERAL DAYS
8. MOVING OR SPEAKING SO SLOWLY THAT OTHER PEOPLE COULD HAVE NOTICED. OR THE OPPOSITE, BEING SO FIGETY OR RESTLESS THAT YOU HAVE BEEN MOVING AROUND A LOT MORE THAN USUAL: NOT AT ALL
6. FEELING BAD ABOUT YOURSELF - OR THAT YOU ARE A FAILURE OR HAVE LET YOURSELF OR YOUR FAMILY DOWN: NOT AL ALL

## 2024-04-17 ASSESSMENT — FIBROSIS 4 INDEX: FIB4 SCORE: 3.12

## 2024-04-17 NOTE — ASSESSMENT & PLAN NOTE
Chronic stable problem, gives good history and no clinical concern for dementing process, continue with observation at this time.

## 2024-04-17 NOTE — ASSESSMENT & PLAN NOTE
New problem, discussed that weight loss can be part of normal aging especially as he is cut out some of the high-calorie snacking and drinks he was eating previously.  Discussed that can be a concern for malignancy and that we will need to continue monitoring for lateralizing symptoms.  Will update lab work to ensure there are no new changes that would require additional evaluation.  He will add Boost or Ensure to his regiment daily as I suspect he is not getting in enough calories at this time.  Will recheck him shortly after his knee surgery and can determine at that time if we need to add additional workup.

## 2024-04-17 NOTE — PROGRESS NOTES
Subjective:   Chief Complaint/History of Present Illness:  Arcenio Barnett is a 84 y.o. male established patient who presents today to discuss medical problems as listed below. Arcenio is unaccompanied for today's visit.    Problem   Iron Deficiency    History of anemia at the time of his knee surgery in August, will follow-up with iron levels and blood counts to ensure this has resolved.     Dermatitis    He reports erythema with slight itching and fullness on the left side of his face.  Encompasses the left lateral aspect of the nose, left maxilla, and even a little bit on the left ear.  Denies any lesions consistent with shingles and no burning or pain.  Is not aware of any irritants causing the problem.  Does not tried anything for this issue.     Unintentional Weight Loss    Reports over the past year or so he has had unintentional weight loss.  His adult life he is always ran between 150 to 155 pounds and now to scale at home is at 130 pounds.  He notes that he has cut out a lot of snacking and soft drinks and generally eats 2 meals a day.  Breakfast is well-balanced with fruits, eggs, toast, pancakes, cream of wheat or oatmeal, and meat.  Dinner is also well-balanced issue with protein, vegetables, and starch source.  Usually because eats breakfast later in the day he does not have a dedicated lunch.  He is agreeable to adding some nutritional supplements over lunchtime.  No meds at this time concerning for malignancy.     Thrombocytopenia ([facility])     Latest Reference Range & Units 08/02/23 10:34   Platelet Count 164 - 446 K/uL 155 (L)     Mild thrombocytopenia on preoperative lab work before his knee arthroplasty.  No bruising or bleeding issues.     Major Depressive Disorder With Single Episode, in Full Remission ([facility])    Depression Screening (4/2024)    Little interest or pleasure in doing things?   Several days  Feeling down, depressed , or hopeless?  Several days  Trouble falling or staying asleep, or  sleeping too much?   Nearly every day  Feeling tired or having little energy?   Several days  Poor appetite or overeating?   Not at all  Feeling bad about yourself - or that you are a failure or have let yourself or your family down?  Not al all  Trouble concentrating on things, such as reading the newspaper or watching television?  Not at all  Moving or speaking so slowly that other people could have noticed.  Or the opposite - being so fidgety or restless that you have been moving around a lot more than usual?   Not at all  Thoughts that you would be better off dead, or of hurting yourself?   Not at all  Patient Health Questionnaire Score:  6      In 2022 he had gone through several medical challenges recently including prostate surgery and then a ground-level fall which led to a humerus fracture and required operative repair.  This was worsened with progression of knee pain as well.  He is now recovered from prostate surgery and had a left total knee arthroplasty and is doing much better with improvement of his mood.     Cerebral Atrophy, Mild (Hcc)    CT head (2/2022): mild cerebral volume loss    Incidental finding on CT head imaging completed after a ground level fall.  No clinical concerns at this time for cognitive impairment.     Prediabetes     Latest Reference Range & Units 04/17/24 09:45   Glycohemoglobin 5.8 % 6.1 !     He had mild glucose intolerance on previous evaluations.  No use of pharmacotherapy to lower blood sugar in the past.  One of his children has type 2 diabetes requiring insulin.     Ckd (Chronic Kidney Disease) Stage 2, Gfr 60-89 Ml/Min    He has evidence of chronic kidney disease on lab work since approximately 2014, improved more recently. He has a history of coronary artery disease.  He previously used ibuprofen 200 mg daily.  No known history of nephrolithiasis or diabetes.  He had an exophytic lesion on the kidney noted on an old image in 2012, follow up renal US demonstrated a  "cyst. He admits to daily use of Advil. He has known BPH and is following with urology.     Dyslipidemia     Latest Reference Range & Units 04/05/23 10:02   Cholesterol,Tot 100 - 199 mg/dL 224 (H)   Triglycerides 0 - 149 mg/dL 86   HDL >=40 mg/dL 66   LDL <100 mg/dL 141 (H)     Patient has a history of hyperlipidemia and associated coronary artery disease.  He was previously on statin therapy however this was discontinued due to myalgias and arthralgias.  Cardiology had mentioned in a previous note initiated him on PSCK9 inhibitor therapy, but does not look like this has happened yet. He is on a baby aspirin daily. We did complete paperwork for Repatha but he elected to stop taking it. Agreeable to going back on ezetimibe 10 mg daily.            Current Medications:  Current Outpatient Medications Ordered in Epic   Medication Sig Dispense Refill    triamcinolone acetonide (KENALOG) 0.1 % Cream Apply 1 Application topically 2 times a day. 45 g 1    carvedilol (COREG) 12.5 MG Tab TAKE 1 TABLET BY MOUTH TWICE DAILY WITH MEALS 200 Tablet 0    ezetimibe (ZETIA) 10 MG Tab Take 1 tablet by mouth once daily 100 Tablet 3    aspirin (ASA) 81 MG Chew Tab chewable tablet Chew 81 mg every day.      ibuprofen (MOTRIN) 200 MG Tab Take 400 mg by mouth every 6 hours as needed.      Multiple Vitamins-Minerals (CENTRUM SILVER 50+MEN PO) Take 1 Tablet by mouth every day.       No current [facility]-ordered facility-administered medications on file.          Objective:   Physical Exam:    Vitals: /60   Pulse 87   Temp 36.8 °C (98.3 °F) (Temporal)   Resp 18   Ht 1.676 m (5' 6\")   Wt 62.1 kg (136 lb 14.4 oz)   SpO2 92%    BMI: Body mass index is 22.1 kg/m².  Physical Exam  Constitutional:       General: He is not in acute distress.     Appearance: Normal appearance. He is not ill-appearing.   HENT:      Right Ear: External ear normal.      Left Ear: External ear normal.   Eyes:      General: No scleral icterus.     " Conjunctiva/sclera: Conjunctivae normal.   Cardiovascular:      Rate and Rhythm: Normal rate and regular rhythm.      Pulses: Normal pulses.   Pulmonary:      Effort: Pulmonary effort is normal. No respiratory distress.      Breath sounds: Normal breath sounds. No wheezing or rhonchi.   Abdominal:      General: Bowel sounds are normal. There is no distension.      Palpations: Abdomen is soft.      Tenderness: There is no abdominal tenderness.   Musculoskeletal:      Right lower leg: No edema.      Left lower leg: No edema.   Skin:     General: Skin is warm and dry.      Findings: Rash present.      Comments: Left face with slight erythema and fullness, no obvious lesions   Psychiatric:         Mood and Affect: Mood normal.         Behavior: Behavior normal.         Thought Content: Thought content normal.         Judgment: Judgment normal.          Assessment and Plan:   Arcenio is a 84 y.o. male with the following:  Problem List Items Addressed This Visit       Cerebral atrophy, mild (HCC)     Chronic stable problem, gives good history and no clinical concern for dementing process, continue with observation at this time.         Dermatitis     New problem, looks most consistent with a local dermatitis perhaps from an irritant reaction.  Recommend he start with triamcinolone 0.1% twice daily for 14 days.  If doing no better then will recommend full evaluation with dermatology.  He agrees and will keep me updated on MyChart.         Relevant Medications    triamcinolone acetonide (KENALOG) 0.1 % Cream    Dyslipidemia     Chronic ongoing problem, update lipid panel to ensure stability, continue ezetimibe 10 mg daily.         Relevant Orders    FREE THYROXINE    TSH    VITAMIN B12    VITAMIN D,25 HYDROXY (DEFICIENCY)    Lipid Profile    Comp Metabolic Panel    CBC WITH DIFFERENTIAL    Iron deficiency     Previous problem, recheck iron and blood counts to ensure resolution.  It at the time of his knee surgery.          Relevant Orders    FERRITIN    IRON/TOTAL IRON BIND    Major depressive disorder with single episode, in full remission (HCC)     Chronic stable problem, mood is doing well, upcoming plans to do knee replacement on the contralateral side in June.  He has been eating a healthier diet as well.         Prediabetes     Chronic stable problem, A1c doing well with diet approach, no indication to start pharmacotherapy at this time.         Relevant Orders    POCT Hemoglobin A1C (Completed)    Thrombocytopenia (HCC)     Chronic ongoing problem, recheck with annual lab work to ensure stability.  No easy bruising or bleeding concerns at this time.         Unintentional weight loss     New problem, discussed that weight loss can be part of normal aging especially as he is cut out some of the high-calorie snacking and drinks he was eating previously.  Discussed that can be a concern for malignancy and that we will need to continue monitoring for lateralizing symptoms.  Will update lab work to ensure there are no new changes that would require additional evaluation.  He will add Boost or Ensure to his regiment daily as I suspect he is not getting in enough calories at this time.  Will recheck him shortly after his knee surgery and can determine at that time if we need to add additional workup.               RTC: Return in about 3 months (around 7/17/2024).    I spent a total of 34 minutes with record review, exam, communication with the patient, communication with other providers, and documentation of this encounter.    PLEASE NOTE: This dictation was created using voice recognition software. I have made every reasonable attempt to correct obvious errors, but I expect that there are errors of grammar and possibly content that I did not discover before finalizing the note.      Mirna Charlton, DO  Geriatric and Internal Medicine  [facility]

## 2024-04-17 NOTE — ASSESSMENT & PLAN NOTE
Previous problem, recheck iron and blood counts to ensure resolution.  It at the time of his knee surgery.

## 2024-04-17 NOTE — ASSESSMENT & PLAN NOTE
Chronic stable problem, A1c doing well with diet approach, no indication to start pharmacotherapy at this time.

## 2024-04-17 NOTE — ASSESSMENT & PLAN NOTE
Chronic stable problem, mood is doing well, upcoming plans to do knee replacement on the contralateral side in June.  He has been eating a healthier diet as well.

## 2024-04-17 NOTE — ASSESSMENT & PLAN NOTE
New problem, looks most consistent with a local dermatitis perhaps from an irritant reaction.  Recommend he start with triamcinolone 0.1% twice daily for 14 days.  If doing no better then will recommend full evaluation with dermatology.  He agrees and will keep me updated on MyChart.

## 2024-04-17 NOTE — ASSESSMENT & PLAN NOTE
Chronic ongoing problem, recheck with annual lab work to ensure stability.  No easy bruising or bleeding concerns at this time.

## 2024-05-13 ENCOUNTER — APPOINTMENT (OUTPATIENT)
Dept: ADMISSIONS | Facility: MEDICAL CENTER | Age: 84
End: 2024-05-13
Attending: ORTHOPAEDIC SURGERY
Payer: MEDICARE

## 2024-05-21 ENCOUNTER — APPOINTMENT (OUTPATIENT)
Dept: ADMISSIONS | Facility: MEDICAL CENTER | Age: 84
End: 2024-05-21
Attending: ORTHOPAEDIC SURGERY
Payer: MEDICARE

## 2024-05-29 ENCOUNTER — PRE-ADMISSION TESTING (OUTPATIENT)
Dept: ADMISSIONS | Facility: MEDICAL CENTER | Age: 84
End: 2024-05-29
Attending: ORTHOPAEDIC SURGERY
Payer: MEDICARE

## 2024-05-29 VITALS — BODY MASS INDEX: 21.44 KG/M2 | HEIGHT: 67 IN

## 2024-05-29 NOTE — PREPROCEDURE INSTRUCTIONS
"Preadmit appointment: \" Preparing for your Procedure information\" sheet given to patient with verbal and written instructions.     Pt states, \"no issues with anesthesia\".  Fasting guidelines per MD's instructions for type of diet and when to initiate NPO status.      All Pt's questions and concerns answered or addressed.  "

## 2024-06-04 ENCOUNTER — DOCUMENTATION (OUTPATIENT)
Dept: HEALTH INFORMATION MANAGEMENT | Facility: OTHER | Age: 84
End: 2024-06-04
Payer: MEDICARE

## 2024-06-05 ENCOUNTER — PRE-ADMISSION TESTING (OUTPATIENT)
Dept: ADMISSIONS | Facility: MEDICAL CENTER | Age: 84
End: 2024-06-05
Attending: ORTHOPAEDIC SURGERY
Payer: MEDICARE

## 2024-06-05 DIAGNOSIS — Z01.810 PRE-OPERATIVE CARDIOVASCULAR EXAMINATION: ICD-10-CM

## 2024-06-05 DIAGNOSIS — Z01.812 PRE-OPERATIVE LABORATORY EXAMINATION: ICD-10-CM

## 2024-06-05 LAB
ANION GAP SERPL CALC-SCNC: 9 MMOL/L (ref 7–16)
BUN SERPL-MCNC: 22 MG/DL (ref 8–22)
CALCIUM SERPL-MCNC: 9.2 MG/DL (ref 8.4–10.2)
CHLORIDE SERPL-SCNC: 108 MMOL/L (ref 96–112)
CO2 SERPL-SCNC: 25 MMOL/L (ref 20–33)
CREAT SERPL-MCNC: 1.19 MG/DL (ref 0.5–1.4)
EKG IMPRESSION: NORMAL
ERYTHROCYTE [DISTWIDTH] IN BLOOD BY AUTOMATED COUNT: 50.6 FL (ref 35.9–50)
GFR SERPLBLD CREATININE-BSD FMLA CKD-EPI: 60 ML/MIN/1.73 M 2
GLUCOSE SERPL-MCNC: 96 MG/DL (ref 65–99)
HCT VFR BLD AUTO: 43.6 % (ref 42–52)
HGB BLD-MCNC: 13.9 G/DL (ref 14–18)
MCH RBC QN AUTO: 31.6 PG (ref 27–33)
MCHC RBC AUTO-ENTMCNC: 31.9 G/DL (ref 32.3–36.5)
MCV RBC AUTO: 99.1 FL (ref 81.4–97.8)
PLATELET # BLD AUTO: 195 K/UL (ref 164–446)
PMV BLD AUTO: 11.2 FL (ref 9–12.9)
POTASSIUM SERPL-SCNC: 4.7 MMOL/L (ref 3.6–5.5)
RBC # BLD AUTO: 4.4 M/UL (ref 4.7–6.1)
SCCMEC + MECA PNL NOSE NAA+PROBE: NEGATIVE
SCCMEC + MECA PNL NOSE NAA+PROBE: NEGATIVE
SODIUM SERPL-SCNC: 142 MMOL/L (ref 135–145)
WBC # BLD AUTO: 6.9 K/UL (ref 4.8–10.8)

## 2024-06-05 PROCEDURE — 87640 STAPH A DNA AMP PROBE: CPT | Mod: XU

## 2024-06-05 PROCEDURE — 80048 BASIC METABOLIC PNL TOTAL CA: CPT

## 2024-06-05 PROCEDURE — 93010 ELECTROCARDIOGRAM REPORT: CPT | Performed by: INTERNAL MEDICINE

## 2024-06-05 PROCEDURE — 87641 MR-STAPH DNA AMP PROBE: CPT

## 2024-06-05 PROCEDURE — 93005 ELECTROCARDIOGRAM TRACING: CPT

## 2024-06-05 PROCEDURE — 36415 COLL VENOUS BLD VENIPUNCTURE: CPT

## 2024-06-05 PROCEDURE — 85027 COMPLETE CBC AUTOMATED: CPT

## 2024-06-05 NOTE — DISCHARGE PLANNING
DISCHARGE PLANNING NOTE - TOTAL JOINT    Procedure: Procedure(s):  ROBOTIC RIGHT TOTAL KNEE ARTHROPLASTY  Procedure Date: 6/18/2024  Insurance: Payor: [facility] SENIOR CARE PLUS / Plan: [facility] DELFINA CANALES  / Product Type: Medicare Advantage /    Equipment currently available at home?  cane, front-wheel walker, raised toilet seat, and shower chair  Steps into the home? 4  Steps within the home? 0  Toilet height? Standard  Type of shower?  both  Home Oxygen? No  Portable tank?    Oxygen Provider:  Who will be with you during your recovery? spouse  Is Outpatient Physical Therapy set up after surgery? Yes  Did you take the Total Joint Class and where? Yes, received NAON book.  Planning same day discharge?Yes     This writer met with pt and educated to preop showers, nasal mrsa swab which was obtained by this writer, and potential for overnight stay. CHG kit given to pt. Home safety checklist sent home with pt. Pt educated to dc criteria. All questions answered and verbalizes understanding of all instructions. No dc needs identified at this time. Anticipate dc to home without barriers.

## 2024-06-10 RX ORDER — MELOXICAM 7.5 MG/1
7.5 TABLET ORAL DAILY
Qty: 30 TABLET | Refills: 0 | Status: SHIPPED | OUTPATIENT
Start: 2024-06-10 | End: 2024-07-10

## 2024-06-10 NOTE — DISCHARGE INSTRUCTIONS
Use incentive spirometer as instructed at least 10x/hour every hour you are awake    ACTIVITY: Rest and take it easy for the first 24 hours.  A responsible adult is recommended to remain with you during that time.  It is normal to feel sleepy.  We encourage you to not do anything that requires balance, judgment or coordination.    MILD FLU-LIKE SYMPTOMS ARE NORMAL. YOU MAY EXPERIENCE GENERALIZED MUSCLE ACHES, THROAT IRRITATION, HEADACHE AND/OR SOME NAUSEA.    FOR 24 HOURS DO NOT:  Drive, operate machinery or run household appliances.  Drink beer or alcoholic beverages.   Make important decisions or sign legal documents.    DIET: To avoid nausea, slowly advance diet as tolerated, avoiding spicy or greasy foods for the first day.  Add more substantial food to your diet according to your physician's instructions.   INCREASE FLUIDS AND FIBER TO AVOID CONSTIPATION.    You should CALL YOUR PHYSICIAN if you develop:  Fever greater than 101 degrees F.  Pain not relieved by medication, or persistent nausea or vomiting.  Excessive bleeding (blood soaking through dressing) or unexpected drainage from the wound.  Extreme redness or swelling around the incision site, drainage of pus or foul smelling drainage.  Inability to urinate or empty your bladder within 8 hours.  Problems with breathing or chest pain.    You should call 911 if you develop problems with breathing or chest pain.  If you are unable to contact your doctor or surgical center, you should go to the nearest emergency room or urgent care center.      Physician's telephone #: Dr. John [phone]    If any questions arise, call your doctor.  If your doctor is not available, please feel free to call the Surgical Center at [phone].     A registered nurse may call you a few days after your surgery to see how you are doing after your procedure.    MEDICATIONS: Resume taking daily medication.  Take prescribed pain medication with food.  If no medication is  prescribed, you may take non-aspirin pain medication if needed.  PAIN MEDICATION CAN BE VERY CONSTIPATING.  Take a stool softener or laxative such as senokot, pericolace, or milk of magnesia if needed.      Last pain medication given: None      If your physician has prescribed pain medication that includes Acetaminophen (Tylenol), do not take additional Acetaminophen (Tylenol) while taking the prescribed medication.    Patient will be discharged home and follow up with Dr. John in 2 weeks, for which the patient already has scheduled.    You may call or text Dr. John' medical assistant during business hours at [phone].  You may call the emergency VICKY line during nights/weekends/holidays if needed at [phone].    Dr. John' Home Care Instructions:  -Leave the bandage in place until your followup appointment in 2 weeks.  -May shower with bandage in place, if bandage becomes soaked underneath please remove and call the office immediately.  -No baths/tubs/pools/submersion of the wound for now.  -Call if there is significant drainage beneath the bandage    -Put as much weight as comfortable on the operative leg.  Use a walker to assist with balance to avoid any falls.  -It is important to start moving and stretching right away, otherwise the joint can stiffen.    -Take your blood clot prevention medication for 4 weeks after surgery (81 mg of Aspirin, twice daily).  -Use ice frequently to help with pain and swelling control  -Pain management:  Standard pain regimen should be:  Ice as much as possible.  Apply the ice anywhere you feel pain.  Meloxicam (anti-inflammatory)- 7.5mg one tablet every day.  Take this automatically on a scheduled basis.  Do not take any other NSAIDs while taking this.  Tylenol (acetaminophen) - 1000mg every 8 hours. Take this automatically on a scheduled basis.  Tramadol - 1-2 capsules every 6 hours. Take this automatically on a scheduled basis until no longer needed for  pain.  Oxycodone - 1-2 tablets every 6 hours only if needed.  You would take the oxycodone 3 hours after the Tramadol, such that you are taking one or the other every 3 hours        -Try to limit the amount of oxycodone since it tends to cause constipation, drowsiness, etc.  But if you need it, take it.        -100mg of Colace (docusate) will be prescribed. Take this twice a day while still taking Tramadol or Oxycodone. Can discontinue after opiates are no longer being taken.  If bowel movement has not occurred in 48 hours please add in a laxative called Senna (over the counter) twice daily in conjunction with the colace. If no bowel movement is produced 48 hours after adding in Senna please start Milk of Magnesia (over the counter).     AGGRESSIVE RANGE OF MOTION.  Do a quad set a million times per day.  Have a family member push down on the thigh and shin every 20 minutes with 2-3 pillows under the heel to help stretch the knee straight.  Flex the knee as far as possible every 20 minutes.        Peripheral Nerve Block Discharge Instructions from Same Day Surgery and Inpatient :    What to Expect - Lower Extremity  The block may cause you to experience numbness and weakness in your thigh and knee or calf and foot on the same side as your surgery  Numbness, tingling and / or weakness are all normal. For some people, this may be an unpleasant sensation  These issues will be resolved when the local anesthetic wears off   You may experience numbness and tingling in your thigh on the same side as your surgery if the block medicine was injected at your groin area  Numbness will make it difficult to walk  You may have problems with balance and walking so be very careful   Follow your surgeon's direction regarding weight bearing on your surgical limb  Be very careful with your numb limb  Precautions  The numbness may affect your balance  Be careful when walking or moving around  Your leg may be weak: be very careful  "putting weight on it  If your surgeon did not specify a time, you should not bear weight for 24 hours  Be sure to ask for help when you need it  It is better to have help than to fall and hurt yourself  Prevent Injury  Protect the limb like a baby  Beware of exposing your limb to extreme heat or cold or trauma  The limb may be injured without you noticing because it is numb  Keep the limb elevated whenever possible  Do not sleep on the limb  Change the position of the limb regularly  Avoid putting pressure on your surgical limb  Pain Control  The initial block on the day of surgery will make your extremity feel \"numb\"  Any consecutive injection including prior to discharge from the hospital will make your extremity feel \"numb\"  You may feel an aching or burning when the local anesthesia starts to wear off  Take pain pills as prescribed by your surgeon  Call your surgeon or anesthesiologist if you do not have adequate pain control         "

## 2024-06-17 ENCOUNTER — ANESTHESIA EVENT (OUTPATIENT)
Dept: SURGERY | Facility: MEDICAL CENTER | Age: 84
End: 2024-06-17
Payer: MEDICARE

## 2024-06-18 ENCOUNTER — ANESTHESIA (OUTPATIENT)
Dept: SURGERY | Facility: MEDICAL CENTER | Age: 84
End: 2024-06-18
Payer: MEDICARE

## 2024-06-18 ENCOUNTER — HOSPITAL ENCOUNTER (OUTPATIENT)
Facility: MEDICAL CENTER | Age: 84
End: 2024-06-18
Attending: ORTHOPAEDIC SURGERY | Admitting: ORTHOPAEDIC SURGERY
Payer: MEDICARE

## 2024-06-18 VITALS
RESPIRATION RATE: 16 BRPM | OXYGEN SATURATION: 95 % | BODY MASS INDEX: 21.61 KG/M2 | TEMPERATURE: 96.8 F | HEIGHT: 66 IN | DIASTOLIC BLOOD PRESSURE: 79 MMHG | HEART RATE: 57 BPM | WEIGHT: 134.48 LBS | SYSTOLIC BLOOD PRESSURE: 128 MMHG

## 2024-06-18 DIAGNOSIS — M17.11 PRIMARY OSTEOARTHRITIS OF RIGHT KNEE: ICD-10-CM

## 2024-06-18 PROCEDURE — 160031 HCHG SURGERY MINUTES - 1ST 30 MINS LEVEL 5: Performed by: ORTHOPAEDIC SURGERY

## 2024-06-18 PROCEDURE — 27447 TOTAL KNEE ARTHROPLASTY: CPT | Mod: ASROC,RT | Performed by: STUDENT IN AN ORGANIZED HEALTH CARE EDUCATION/TRAINING PROGRAM

## 2024-06-18 PROCEDURE — 160002 HCHG RECOVERY MINUTES (STAT): Performed by: ORTHOPAEDIC SURGERY

## 2024-06-18 PROCEDURE — 97110 THERAPEUTIC EXERCISES: CPT

## 2024-06-18 PROCEDURE — 160047 HCHG PACU  - EA ADDL 30 MINS PHASE II: Performed by: ORTHOPAEDIC SURGERY

## 2024-06-18 PROCEDURE — 160009 HCHG ANES TIME/MIN: Performed by: ORTHOPAEDIC SURGERY

## 2024-06-18 PROCEDURE — 700111 HCHG RX REV CODE 636 W/ 250 OVERRIDE (IP): Performed by: STUDENT IN AN ORGANIZED HEALTH CARE EDUCATION/TRAINING PROGRAM

## 2024-06-18 PROCEDURE — 20985 CPTR-ASST DIR MS PX: CPT | Mod: ASROC | Performed by: STUDENT IN AN ORGANIZED HEALTH CARE EDUCATION/TRAINING PROGRAM

## 2024-06-18 PROCEDURE — 502000 HCHG MISC OR IMPLANTS RC 0278: Performed by: ORTHOPAEDIC SURGERY

## 2024-06-18 PROCEDURE — 502714 HCHG ROBOTIC SURGERY SERVICES: Performed by: ORTHOPAEDIC SURGERY

## 2024-06-18 PROCEDURE — 160035 HCHG PACU - 1ST 60 MINS PHASE I: Performed by: ORTHOPAEDIC SURGERY

## 2024-06-18 PROCEDURE — 64447 NJX AA&/STRD FEMORAL NRV IMG: CPT | Performed by: ORTHOPAEDIC SURGERY

## 2024-06-18 PROCEDURE — C1776 JOINT DEVICE (IMPLANTABLE): HCPCS | Performed by: ORTHOPAEDIC SURGERY

## 2024-06-18 PROCEDURE — 27447 TOTAL KNEE ARTHROPLASTY: CPT | Mod: RT | Performed by: ORTHOPAEDIC SURGERY

## 2024-06-18 PROCEDURE — 97161 PT EVAL LOW COMPLEX 20 MIN: CPT

## 2024-06-18 PROCEDURE — C1713 ANCHOR/SCREW BN/BN,TIS/BN: HCPCS | Performed by: ORTHOPAEDIC SURGERY

## 2024-06-18 PROCEDURE — 160048 HCHG OR STATISTICAL LEVEL 1-5: Performed by: ORTHOPAEDIC SURGERY

## 2024-06-18 PROCEDURE — 97116 GAIT TRAINING THERAPY: CPT

## 2024-06-18 PROCEDURE — 20985 CPTR-ASST DIR MS PX: CPT | Performed by: ORTHOPAEDIC SURGERY

## 2024-06-18 PROCEDURE — 700105 HCHG RX REV CODE 258: Performed by: ORTHOPAEDIC SURGERY

## 2024-06-18 PROCEDURE — 160046 HCHG PACU - 1ST 60 MINS PHASE II: Performed by: ORTHOPAEDIC SURGERY

## 2024-06-18 PROCEDURE — 700102 HCHG RX REV CODE 250 W/ 637 OVERRIDE(OP): Performed by: STUDENT IN AN ORGANIZED HEALTH CARE EDUCATION/TRAINING PROGRAM

## 2024-06-18 PROCEDURE — A9270 NON-COVERED ITEM OR SERVICE: HCPCS | Performed by: STUDENT IN AN ORGANIZED HEALTH CARE EDUCATION/TRAINING PROGRAM

## 2024-06-18 PROCEDURE — 160042 HCHG SURGERY MINUTES - EA ADDL 1 MIN LEVEL 5: Performed by: ORTHOPAEDIC SURGERY

## 2024-06-18 PROCEDURE — 700101 HCHG RX REV CODE 250: Performed by: ORTHOPAEDIC SURGERY

## 2024-06-18 PROCEDURE — 160025 RECOVERY II MINUTES (STATS): Performed by: ORTHOPAEDIC SURGERY

## 2024-06-18 PROCEDURE — 160036 HCHG PACU - EA ADDL 30 MINS PHASE I: Performed by: ORTHOPAEDIC SURGERY

## 2024-06-18 PROCEDURE — 700101 HCHG RX REV CODE 250: Performed by: STUDENT IN AN ORGANIZED HEALTH CARE EDUCATION/TRAINING PROGRAM

## 2024-06-18 PROCEDURE — 700111 HCHG RX REV CODE 636 W/ 250 OVERRIDE (IP): Performed by: ORTHOPAEDIC SURGERY

## 2024-06-18 DEVICE — BASEPLATE TIBIAL TRIATHLON TRITANIUM SIZE 6 (1EA): Type: IMPLANTABLE DEVICE | Site: KNEE | Status: FUNCTIONAL

## 2024-06-18 DEVICE — IMPLANTABLE DEVICE: Type: IMPLANTABLE DEVICE | Site: KNEE | Status: FUNCTIONAL

## 2024-06-18 RX ORDER — CEFAZOLIN SODIUM 1 G/3ML
2 INJECTION, POWDER, FOR SOLUTION INTRAMUSCULAR; INTRAVENOUS ONCE
Status: COMPLETED | OUTPATIENT
Start: 2024-06-18 | End: 2024-06-18

## 2024-06-18 RX ORDER — ASPIRIN 81 MG/1
81 TABLET ORAL 2 TIMES DAILY
Status: CANCELLED | OUTPATIENT
Start: 2024-06-18

## 2024-06-18 RX ORDER — HYDROMORPHONE HYDROCHLORIDE 1 MG/ML
0.1 INJECTION, SOLUTION INTRAMUSCULAR; INTRAVENOUS; SUBCUTANEOUS
Status: DISCONTINUED | OUTPATIENT
Start: 2024-06-18 | End: 2024-06-18 | Stop reason: HOSPADM

## 2024-06-18 RX ORDER — VANCOMYCIN HYDROCHLORIDE 1 G/20ML
INJECTION, POWDER, LYOPHILIZED, FOR SOLUTION INTRAVENOUS
Status: COMPLETED | OUTPATIENT
Start: 2024-06-18 | End: 2024-06-18

## 2024-06-18 RX ORDER — OXYCODONE HYDROCHLORIDE 10 MG/1
10 TABLET ORAL
Status: CANCELLED | OUTPATIENT
Start: 2024-06-18

## 2024-06-18 RX ORDER — KETOROLAC TROMETHAMINE 15 MG/ML
15 INJECTION, SOLUTION INTRAMUSCULAR; INTRAVENOUS EVERY 6 HOURS
Status: CANCELLED | OUTPATIENT
Start: 2024-06-18 | End: 2024-06-21

## 2024-06-18 RX ORDER — SODIUM CHLORIDE, SODIUM LACTATE, POTASSIUM CHLORIDE, CALCIUM CHLORIDE 600; 310; 30; 20 MG/100ML; MG/100ML; MG/100ML; MG/100ML
INJECTION, SOLUTION INTRAVENOUS CONTINUOUS
Status: DISCONTINUED | OUTPATIENT
Start: 2024-06-18 | End: 2024-06-18 | Stop reason: HOSPADM

## 2024-06-18 RX ORDER — DIPHENHYDRAMINE HCL 25 MG
25 TABLET ORAL NIGHTLY PRN
Status: CANCELLED | OUTPATIENT
Start: 2024-06-19

## 2024-06-18 RX ORDER — TRAMADOL HYDROCHLORIDE 50 MG/1
50 TABLET ORAL EVERY 4 HOURS PRN
Status: CANCELLED | OUTPATIENT
Start: 2024-06-18

## 2024-06-18 RX ORDER — HYDROMORPHONE HYDROCHLORIDE 1 MG/ML
0.5 INJECTION, SOLUTION INTRAMUSCULAR; INTRAVENOUS; SUBCUTANEOUS
Status: CANCELLED | OUTPATIENT
Start: 2024-06-18

## 2024-06-18 RX ORDER — ACETAMINOPHEN 500 MG
1000 TABLET ORAL EVERY 6 HOURS
Status: CANCELLED | OUTPATIENT
Start: 2024-06-18 | End: 2024-06-23

## 2024-06-18 RX ORDER — IBUPROFEN 400 MG/1
800 TABLET ORAL 3 TIMES DAILY PRN
Status: CANCELLED | OUTPATIENT
Start: 2024-06-21

## 2024-06-18 RX ORDER — OXYCODONE HYDROCHLORIDE 5 MG/1
5 TABLET ORAL
Status: CANCELLED | OUTPATIENT
Start: 2024-06-18

## 2024-06-18 RX ORDER — POLYETHYLENE GLYCOL 3350 17 G/17G
1 POWDER, FOR SOLUTION ORAL 2 TIMES DAILY PRN
Status: CANCELLED | OUTPATIENT
Start: 2024-06-18

## 2024-06-18 RX ORDER — AMOXICILLIN 250 MG
1 CAPSULE ORAL NIGHTLY
Status: CANCELLED | OUTPATIENT
Start: 2024-06-18

## 2024-06-18 RX ORDER — DEXAMETHASONE SODIUM PHOSPHATE 4 MG/ML
INJECTION, SOLUTION INTRA-ARTICULAR; INTRALESIONAL; INTRAMUSCULAR; INTRAVENOUS; SOFT TISSUE PRN
Status: DISCONTINUED | OUTPATIENT
Start: 2024-06-18 | End: 2024-06-18 | Stop reason: SURG

## 2024-06-18 RX ORDER — ONDANSETRON 2 MG/ML
4 INJECTION INTRAMUSCULAR; INTRAVENOUS EVERY 4 HOURS PRN
Status: CANCELLED | OUTPATIENT
Start: 2024-06-18

## 2024-06-18 RX ORDER — DEXAMETHASONE SODIUM PHOSPHATE 4 MG/ML
4 INJECTION, SOLUTION INTRA-ARTICULAR; INTRALESIONAL; INTRAMUSCULAR; INTRAVENOUS; SOFT TISSUE
Status: CANCELLED | OUTPATIENT
Start: 2024-06-18

## 2024-06-18 RX ORDER — DIPHENHYDRAMINE HCL 25 MG
25 TABLET ORAL EVERY 6 HOURS PRN
Status: CANCELLED | OUTPATIENT
Start: 2024-06-18

## 2024-06-18 RX ORDER — ACETAMINOPHEN 500 MG
1000 TABLET ORAL EVERY 6 HOURS PRN
Status: CANCELLED | OUTPATIENT
Start: 2024-06-23

## 2024-06-18 RX ORDER — KETOROLAC TROMETHAMINE 30 MG/ML
INJECTION, SOLUTION INTRAMUSCULAR; INTRAVENOUS
Status: DISCONTINUED | OUTPATIENT
Start: 2024-06-18 | End: 2024-06-18 | Stop reason: HOSPADM

## 2024-06-18 RX ORDER — HALOPERIDOL 5 MG/ML
1 INJECTION INTRAMUSCULAR EVERY 6 HOURS PRN
Status: CANCELLED | OUTPATIENT
Start: 2024-06-18

## 2024-06-18 RX ORDER — HYDROMORPHONE HYDROCHLORIDE 1 MG/ML
0.4 INJECTION, SOLUTION INTRAMUSCULAR; INTRAVENOUS; SUBCUTANEOUS
Status: DISCONTINUED | OUTPATIENT
Start: 2024-06-18 | End: 2024-06-18 | Stop reason: HOSPADM

## 2024-06-18 RX ORDER — CARVEDILOL 6.25 MG/1
12.5 TABLET ORAL 2 TIMES DAILY WITH MEALS
Status: CANCELLED | OUTPATIENT
Start: 2024-06-18

## 2024-06-18 RX ORDER — TRANEXAMIC ACID 100 MG/ML
INJECTION, SOLUTION INTRAVENOUS PRN
Status: DISCONTINUED | OUTPATIENT
Start: 2024-06-18 | End: 2024-06-18 | Stop reason: SURG

## 2024-06-18 RX ORDER — BUPIVACAINE HYDROCHLORIDE 2.5 MG/ML
INJECTION, SOLUTION EPIDURAL; INFILTRATION; INTRACAUDAL
Status: COMPLETED | OUTPATIENT
Start: 2024-06-18 | End: 2024-06-18

## 2024-06-18 RX ORDER — OXYCODONE HCL 5 MG/5 ML
5 SOLUTION, ORAL ORAL
Status: DISCONTINUED | OUTPATIENT
Start: 2024-06-18 | End: 2024-06-18 | Stop reason: HOSPADM

## 2024-06-18 RX ORDER — ONDANSETRON 2 MG/ML
4 INJECTION INTRAMUSCULAR; INTRAVENOUS
Status: DISCONTINUED | OUTPATIENT
Start: 2024-06-18 | End: 2024-06-18 | Stop reason: HOSPADM

## 2024-06-18 RX ORDER — OMEPRAZOLE 20 MG/1
20 CAPSULE, DELAYED RELEASE ORAL 2 TIMES DAILY PRN
Status: CANCELLED | OUTPATIENT
Start: 2024-06-18 | End: 2024-06-28

## 2024-06-18 RX ORDER — ENEMA 19; 7 G/133ML; G/133ML
1 ENEMA RECTAL
Status: CANCELLED | OUTPATIENT
Start: 2024-06-18

## 2024-06-18 RX ORDER — BISACODYL 10 MG
10 SUPPOSITORY, RECTAL RECTAL
Status: CANCELLED | OUTPATIENT
Start: 2024-06-18

## 2024-06-18 RX ORDER — LIDOCAINE HYDROCHLORIDE 20 MG/ML
INJECTION, SOLUTION EPIDURAL; INFILTRATION; INTRACAUDAL; PERINEURAL PRN
Status: DISCONTINUED | OUTPATIENT
Start: 2024-06-18 | End: 2024-06-18 | Stop reason: SURG

## 2024-06-18 RX ORDER — ACETAMINOPHEN 500 MG
1000 TABLET ORAL ONCE
Status: COMPLETED | OUTPATIENT
Start: 2024-06-18 | End: 2024-06-18

## 2024-06-18 RX ORDER — OXYCODONE HCL 5 MG/5 ML
10 SOLUTION, ORAL ORAL
Status: DISCONTINUED | OUTPATIENT
Start: 2024-06-18 | End: 2024-06-18 | Stop reason: HOSPADM

## 2024-06-18 RX ORDER — HYDROMORPHONE HYDROCHLORIDE 1 MG/ML
0.2 INJECTION, SOLUTION INTRAMUSCULAR; INTRAVENOUS; SUBCUTANEOUS
Status: DISCONTINUED | OUTPATIENT
Start: 2024-06-18 | End: 2024-06-18 | Stop reason: HOSPADM

## 2024-06-18 RX ORDER — EPINEPHRINE 1 MG/ML(1)
AMPUL (ML) INJECTION
Status: DISCONTINUED | OUTPATIENT
Start: 2024-06-18 | End: 2024-06-18 | Stop reason: HOSPADM

## 2024-06-18 RX ORDER — DEXAMETHASONE SODIUM PHOSPHATE 4 MG/ML
INJECTION, SOLUTION INTRA-ARTICULAR; INTRALESIONAL; INTRAMUSCULAR; INTRAVENOUS; SOFT TISSUE
Status: COMPLETED | OUTPATIENT
Start: 2024-06-18 | End: 2024-06-18

## 2024-06-18 RX ORDER — SCOLOPAMINE TRANSDERMAL SYSTEM 1 MG/1
1 PATCH, EXTENDED RELEASE TRANSDERMAL
Status: CANCELLED | OUTPATIENT
Start: 2024-06-18

## 2024-06-18 RX ORDER — HALOPERIDOL 5 MG/ML
1 INJECTION INTRAMUSCULAR
Status: DISCONTINUED | OUTPATIENT
Start: 2024-06-18 | End: 2024-06-18 | Stop reason: HOSPADM

## 2024-06-18 RX ORDER — GLYCOPYRROLATE 0.2 MG/ML
INJECTION INTRAMUSCULAR; INTRAVENOUS PRN
Status: DISCONTINUED | OUTPATIENT
Start: 2024-06-18 | End: 2024-06-18 | Stop reason: SURG

## 2024-06-18 RX ORDER — ROPIVACAINE HYDROCHLORIDE 5 MG/ML
INJECTION, SOLUTION EPIDURAL; INFILTRATION; PERINEURAL
Status: DISCONTINUED | OUTPATIENT
Start: 2024-06-18 | End: 2024-06-18 | Stop reason: HOSPADM

## 2024-06-18 RX ORDER — SODIUM CHLORIDE 9 MG/ML
INJECTION, SOLUTION INTRAMUSCULAR; INTRAVENOUS; SUBCUTANEOUS
Status: DISCONTINUED | OUTPATIENT
Start: 2024-06-18 | End: 2024-06-18 | Stop reason: HOSPADM

## 2024-06-18 RX ORDER — DIPHENHYDRAMINE HYDROCHLORIDE 50 MG/ML
25 INJECTION INTRAMUSCULAR; INTRAVENOUS EVERY 6 HOURS PRN
Status: CANCELLED | OUTPATIENT
Start: 2024-06-18

## 2024-06-18 RX ORDER — SODIUM CHLORIDE, SODIUM LACTATE, POTASSIUM CHLORIDE, CALCIUM CHLORIDE 600; 310; 30; 20 MG/100ML; MG/100ML; MG/100ML; MG/100ML
INJECTION, SOLUTION INTRAVENOUS CONTINUOUS
Status: ACTIVE | OUTPATIENT
Start: 2024-06-18 | End: 2024-06-18

## 2024-06-18 RX ORDER — ROCURONIUM BROMIDE 10 MG/ML
INJECTION, SOLUTION INTRAVENOUS PRN
Status: DISCONTINUED | OUTPATIENT
Start: 2024-06-18 | End: 2024-06-18 | Stop reason: SURG

## 2024-06-18 RX ORDER — DOCUSATE SODIUM 100 MG/1
100 CAPSULE, LIQUID FILLED ORAL 2 TIMES DAILY
Status: CANCELLED | OUTPATIENT
Start: 2024-06-18

## 2024-06-18 RX ORDER — AMOXICILLIN 250 MG
1 CAPSULE ORAL
Status: CANCELLED | OUTPATIENT
Start: 2024-06-18

## 2024-06-18 RX ADMIN — PROPOFOL 100 MG: 10 INJECTION, EMULSION INTRAVENOUS at 09:47

## 2024-06-18 RX ADMIN — TRANEXAMIC ACID 1000 MG: 100 INJECTION, SOLUTION INTRAVENOUS at 10:30

## 2024-06-18 RX ADMIN — ROCURONIUM BROMIDE 20 MG: 50 INJECTION, SOLUTION INTRAVENOUS at 09:51

## 2024-06-18 RX ADMIN — ACETAMINOPHEN 1000 MG: 500 TABLET, FILM COATED ORAL at 08:03

## 2024-06-18 RX ADMIN — GLYCOPYRROLATE 0.2 MG: 0.2 INJECTION INTRAMUSCULAR; INTRAVENOUS at 10:16

## 2024-06-18 RX ADMIN — FENTANYL CITRATE 50 MCG: 50 INJECTION, SOLUTION INTRAMUSCULAR; INTRAVENOUS at 09:45

## 2024-06-18 RX ADMIN — FENTANYL CITRATE 50 MCG: 50 INJECTION, SOLUTION INTRAMUSCULAR; INTRAVENOUS at 10:25

## 2024-06-18 RX ADMIN — FENTANYL CITRATE 50 MCG: 50 INJECTION, SOLUTION INTRAMUSCULAR; INTRAVENOUS at 09:51

## 2024-06-18 RX ADMIN — DEXAMETHASONE SODIUM PHOSPHATE 1.3 MG: 4 INJECTION, SOLUTION INTRA-ARTICULAR; INTRALESIONAL; INTRAMUSCULAR; INTRAVENOUS; SOFT TISSUE at 09:31

## 2024-06-18 RX ADMIN — CEFAZOLIN 2 G: 1 INJECTION, POWDER, FOR SOLUTION INTRAMUSCULAR; INTRAVENOUS at 09:52

## 2024-06-18 RX ADMIN — GLYCOPYRROLATE 0.2 MG: 0.2 INJECTION INTRAMUSCULAR; INTRAVENOUS at 10:04

## 2024-06-18 RX ADMIN — DEXAMETHASONE SODIUM PHOSPHATE 6 MG: 4 INJECTION INTRA-ARTICULAR; INTRALESIONAL; INTRAMUSCULAR; INTRAVENOUS; SOFT TISSUE at 09:51

## 2024-06-18 RX ADMIN — SUGAMMADEX 100 MG: 100 INJECTION, SOLUTION INTRAVENOUS at 10:40

## 2024-06-18 RX ADMIN — BUPIVACAINE HYDROCHLORIDE 20 ML: 2.5 INJECTION, SOLUTION EPIDURAL; INFILTRATION; INTRACAUDAL at 09:31

## 2024-06-18 RX ADMIN — LIDOCAINE HYDROCHLORIDE 30 MG: 20 INJECTION, SOLUTION EPIDURAL; INFILTRATION; INTRACAUDAL at 09:47

## 2024-06-18 RX ADMIN — TRANEXAMIC ACID 1000 MG: 100 INJECTION, SOLUTION INTRAVENOUS at 09:52

## 2024-06-18 RX ADMIN — PROPOFOL 50 MG: 10 INJECTION, EMULSION INTRAVENOUS at 09:49

## 2024-06-18 RX ADMIN — SODIUM CHLORIDE, POTASSIUM CHLORIDE, SODIUM LACTATE AND CALCIUM CHLORIDE: 600; 310; 30; 20 INJECTION, SOLUTION INTRAVENOUS at 08:03

## 2024-06-18 ASSESSMENT — COGNITIVE AND FUNCTIONAL STATUS - GENERAL
SUGGESTED CMS G CODE MODIFIER MOBILITY: CJ
MOVING TO AND FROM BED TO CHAIR: A LITTLE
WALKING IN HOSPITAL ROOM: A LITTLE
CLIMB 3 TO 5 STEPS WITH RAILING: A LITTLE
STANDING UP FROM CHAIR USING ARMS: A LITTLE
MOBILITY SCORE: 20

## 2024-06-18 ASSESSMENT — GAIT ASSESSMENTS
DISTANCE (FEET): 150
DEVIATION: ANTALGIC;DECREASED BASE OF SUPPORT
ASSISTIVE DEVICE: FRONT WHEEL WALKER
GAIT LEVEL OF ASSIST: STANDBY ASSIST

## 2024-06-18 ASSESSMENT — PAIN DESCRIPTION - PAIN TYPE
TYPE: SURGICAL PAIN
TYPE: SURGICAL PAIN

## 2024-06-18 ASSESSMENT — FIBROSIS 4 INDEX: FIB4 SCORE: 2.48

## 2024-06-18 NOTE — ANESTHESIA PROCEDURE NOTES
Peripheral Block    Date/Time: 6/18/2024 9:31 AM    Performed by: Philippe Honeycutt M.D.  Authorized by: Philippe Honeycutt M.D.    Patient Location:  Pre-op  Start Time:  6/18/2024 9:31 AM  End Time:  6/18/2024 9:34 AM  Reason for Block: at surgeon's request and post-op pain management ONLY    patient identified, IV checked, site marked, risks and benefits discussed, surgical consent, monitors and equipment checked, pre-op evaluation and timeout performed    Patient Position:  Supine  Prep: ChloraPrep    Monitoring:  Heart rate, continuous pulse ox and cardiac monitor  Block Region:  Lower Extremity  Lower Extremity - Block Type:  Selective FEMORAL nerve block at the Adductor Canal    Laterality:  Right  Procedures: ultrasound guided  Image captured, interpreted and electronically stored.  Local Infiltration:  Lidocaine  Strength:  1 %  Dose:  3 ml  Block Type:  Single-shot  Needle Length:  100mm  Needle Gauge:  21 G  Needle Localization:  Ultrasound guidance  Ultrasound picture in chart  Injection Assessment:  Negative aspiration for heme, no paresthesia on injection, incremental injection and local visualized surrounding nerve on ultrasound  Evidence of intravascular injection: No     US Guided Selective Femoral Nerve Block at Adductor Canal:   US probe placed at mid-thigh level on externally rotated leg and femur identified.  Probe directed medially until Sartorius Muscle (SM), Femoral Artery (FA) and Saphenous Nerve (SN) identified in Adductor Canal (AC).  Needle inserted anterolateral to probe in an in plane approach into a subsartorial perivascular perineural position.  After negative aspiration LA injected with ease and visualized spreading within the AC.

## 2024-06-18 NOTE — OR NURSING
1135: Report received from JEREL Jordan.     1145: Surgical dressing CDI, no complaints of pain or nausea at this time, VSS.     1152: Report to JEREL Jordan.

## 2024-06-18 NOTE — LETTER
March 27, 2024    Patient Name: Arcenio Barnett  Surgeon Name: Phoenix John M.D.  Surgery Facility: [facility] ([address] [place] [address])  Surgery Date: 6/18/2024    The time of your surgery is not final and may change up to and until the day of your surgery. You will be contacted 24-48 hours prior to your surgery date with your check-in and surgery time.    If you will not be at one of the below numbers please call the surgery scheduler at [phone]  Preferred Phone: [phone]    BEFORE YOUR SURGERY   Pre Registration and/or Lab Work must be done within and no earlier than 28 days prior to your surgery date. Your scheduled facility will contact you regarding all required preregistration and/or lab work. If you have not been contacted within 7 days of your scheduled procedure please call [facility] at [phone] for an appointment as soon as possible.    DAY OF YOUR SURGERY  Nothing to eat or drink after midnight     Refrain from smoking any substance after midnight prior to surgery. Smoking may interfere with the anesthetic and frequently produces nausea during the recovery period.    Continue taking all lifesaving medications. Including the morning of your surgery with small sip of water.    Please do NOT take on the day of surgery:  Diuretics: examples- furosemide (Lasix), spironolactone, hydrochlorothiazide  ACE-inhibitors: examples- lisinopril, ramipril, enalapril  “ARBs”: examples- losartan, Olmesartan, valsartan    Please arrive at the hospital/surgery center at the check-in time provided.     An adult will need to bring you and take you home after your surgery.     AFTER YOUR SURGERY  Post op Appointment:   Date: 07/03/2024   Time: 10:30AM   With: Dwayne Lombardo PA-C   Location: [address] [place] [id number]    - Therapy- Your first appointment should be 5-7  day(s) after your surgery. For your convenience we have 4 Physical Therapy  locations: [facility], [place], and [facility]. Call our office ASAP to schedule an appointment at [phone] or take the enclosed Therapy Prescription to a facility of your choice.  - No dental work for 3-6 months after your surgery.  - Post Surgery - You will need someone to drive you home  - Post Surgery - You will need someone to stay with you for 24 hours  - You must have someone provide transportation post surgery and someone to monitor you for at least 24 hours post-surgery. If you don't have either of these your appointment will be canceled.     TIME OFF WORK  FMLA or Disability forms can be faxed directly to: [phone] or you may drop them off at [address] [place] [address]. Our office charges a $35.00 fee per form. Forms will be completed within 10 business days of drop off and payment received. For the status of your forms you may contact our disability office directly [phone].    MEDICATION INSTRUCTIONS **Please read section completely**  The following medications should be stopped a minimum of 10 days prior to surgery:  All over the counter, Supplements & Herbal medications    Anorectics: Phentermine (Adipex-P, Lomaira and Suprenza), Phentermine-topiramate (Qsymia), Bupropion-naltrexone (Contrave)    **If you are on Bupropion for anxiety/depression, please continue this medication up until the day of surgery.     Opiod Partial Agonists/Opioid Antagonists: Buprenorphine (Suboxone, Belbuca, Butrans, Probuphine Implant, Sublocade), Naltrexone (ReVia, Vivitrol), Naloxone    Amphetamines: Dextroamphetamine/Amphetamine (Adderall, Mydayis), Methylphenidate Hydrochloride (Concerta, Metadate, Methylin, Ritalin)    The following medications should be stopped 5 days prior to surgery:  Blood Thinners: Any Aspirin, Aspirin products, anti-inflammatories such as ibuprofen and any blood thinners such as Coumadin and Plavix. Please consult your prescribing physician if you are on life  saving blood thinners, in regards to when to stop medications prior to surgery.     The following medications should be stopped a minimum of 3 days prior to surgery:  PDE-5 inhibitors: Sildenafil (Viagra), Tadalafil (Cialis), Vardenafil (Levitra), Avanafil (Stendra)    MAO Inhibitors: Rasagiline (Azilect), Selegiline (Eldepryl, Emsam, Selapar), Isocarboxazid (Marplan), Phenelzine (Nardil)

## 2024-06-18 NOTE — OR NURSING
1221: Pt walked to stage ll via walker w/ standby assist to bathroom. Unable to void. Awaiting pt's wife.    1304: PT at chairside.    1320: PT complete. Pt to bathroom, but unable to void. Per I/O record, pt has only had less than 500 ml fluid today. IV fluids restarted and pt encouraged to drink.    1352: Pt to bathroom. Pt states he was able to void unknown amt.    1404: Home care instructions reviewed w/ pt and wife. Questions, concerns addressed. Meets criteria for discharge.

## 2024-06-18 NOTE — ANESTHESIA PROCEDURE NOTES
Airway    Date/Time: 6/18/2024 9:49 AM    Performed by: Philippe Honeycutt M.D.  Authorized by: Philippe Honeycutt M.D.    Location:  OR  Urgency:  Elective  Indications for Airway Management:  Anesthesia      Spontaneous Ventilation: absent    Sedation Level:  Deep  Preoxygenated: Yes    Final Airway Type:  Supraglottic airway  Final Supraglottic Airway:  Standard LMA    SGA Size:  4  Number of Attempts at Approach:  1

## 2024-06-18 NOTE — THERAPY
Physical Therapy   Initial Evaluation     Patient Name: Arcenio Barnett  Age:  84 y.o., Sex:  male  Medical Record #: [id number]  Today's Date: 6/18/2024     Precautions  Precautions: (P) Weight Bearing As Tolerated Right Lower Extremity    Assessment  Patient is 84 y.o. male who was seen s/p right TKA with WBAT orders for right lower extremity. Pt was agreeable to therapy evaluation and presented with safe upright functional mobility with AD use after therapy session. Pt was provided with education on elevation, icing, positioning, and supine/seated therapeutic exercises. Pt was able to return demo safe gait mechanics with use of AD on flat level surfaces with appropriate heel to toe gait mechanics. Pt was able to return demo safe mechanics in order to navigate stairs with use of FWW. Pt was able to demonstrate safe use of AD during transfers/transitions and general locomotion with no cassi LOB. Pt has no acute skilled PT needs at this time, anticipate pt to d/c home once medically clear with recs for FWW use and OP therapy services.     The pt was provided with the following skilled therapy txt: Pt was provided with VC, demo, and facilitation during gait training in order to return demo terminal knee extension and appropriate heel strike/flexion of knee in order to return demo appropriate heel to toe gait mechanics. Pt was provided with demo and VC to go up/down steps with safe mechanics with use of AD. Pt was provided with VC and TC for appropriate quad contraction and mechanics during supine/seated therapeutic exercises. Pt was provided with education on intensity, frequency, and duration of exercises.    Plan    Physical Therapy Initial Treatment Plan   Duration: (P) Evaluation only    DC Equipment Recommendations: (P) Front-Wheel Walker  Discharge Recommendations: (P) Recommend outpatient physical therapy services to address higher level deficits     Objective       06/18/24 1320   Initial Contact Note    Initial  Contact Note Order Received and Verified, Evaluation Only - Patient Does Not Require Further Acute Physical Therapy at this Time.  However, May Benefit from Post Acute Therapy for Higher Level Functional Deficits.   Precautions   Precautions Weight Bearing As Tolerated Right Lower Extremity   Pain 0 - 10 Group   Therapist Pain Assessment Nurse Notified;0   Prior Living Situation   Prior Services None   Housing / Facility Mobile Home   Steps Into Home 4   Steps In Home 0   Rail Both Rail (Steps into Home)   Equipment Owned Front-Wheel Walker;4-Wheel Walker   Lives with - Patient's Self Care Capacity Spouse   Comments pt reports spouse is supportive and can assist upon d/c to home   Prior Level of Functional Mobility   Bed Mobility Independent   Transfer Status Independent   Ambulation Independent   Ambulation Distance   (community)   Assistive Devices Used None   Stairs Independent   History of Falls   History of Falls No   Cognition    Cognition / Consciousness WDL   Level of Consciousness Alert   Comments pleasant/cooperative   Passive ROM Upper Body   Passive ROM Upper Body WDL   Active ROM Upper Body   Active ROM Upper Body  WDL   Strength Upper Body   Upper Body Strength  WDL   Sensation Upper Body   Upper Extremity Sensation  WDL   Upper Body Muscle Tone   Upper Body Muscle Tone  WDL   Passive ROM Lower Body   Passive ROM Lower Body X   Comments limited due to pain, able to demo functional PROM for B LE   Active ROM Lower Body    Active ROM Lower Body  X   Comments same as above   Strength Lower Body   Lower Body Strength  X   Comments limited due to pain, able to demo functional strength for B LE   Sensation Lower Body   Lower Extremity Sensation   WDL   Lower Body Muscle Tone   Lower Body Muscle Tone  WDL   Neurological Concerns   Neurological Concerns No   Coordination Upper Body   Coordination WDL   Coordination Lower Body    Coordination Lower Body  WDL   Balance Assessment   Sitting Balance (Static) Good    Sitting Balance (Dynamic) Fair +   Standing Balance (Static) Fair +   Standing Balance (Dynamic) Fair +   Weight Shift Sitting Good   Weight Shift Standing Fair   Comments w/fww   Bed Mobility    Comments found [facility]   Gait Analysis   Gait Level Of Assist Standby Assist   Assistive Device Front Wheel Walker   Distance (Feet) 150   # of Times Distance was Traveled 1   Deviation Antalgic;Decreased Base Of Support   # of Stairs Climbed 2   Level of Assist with Stairs Contact Guard Assist   Weight Bearing Status WBAT R LE   Functional Mobility   Sit to Stand Contact Guard Assist   Bed, Chair, Wheelchair Transfer Standby Assist   Transfer Method Stand Step   Mobility sit<>stand, ambulation, stairs, back to chair   6 Clicks Assessment - How much HELP from from another person do you currently need... (If the patient hasn't done an activity recently, how much help from another person do you think he/she would need if he/she tried?)   Turning from your back to your side while in a flat bed without using bedrails? 4   Moving from lying on your back to sitting on the side of a flat bed without using bedrails? 4   Moving to and from a bed to a chair (including a wheelchair)? 3   Standing up from a chair using your arms (e.g., wheelchair, or bedside chair)? 3   Walking in hospital room? 3   Climbing 3-5 steps with a railing? 3   6 clicks Mobility Score 20   Activity Tolerance   Sitting in Chair functional   Sitting Edge of Bed NT   Standing 10 mins   Comments no adverse events noted   Edema / Skin Assessment   Edema / Skin  Not Assessed   Education Group   Education Provided Role of Physical Therapist   Role of Physical Therapist Patient Response Patient;Acceptance;Explanation;Demonstration;Verbal Demonstration;Action Demonstration   Physical Therapy Initial Treatment Plan    Duration Evaluation only   Anticipated Discharge Equipment and Recommendations   DC Equipment Recommendations Front-Wheel Walker   Discharge  Recommendations Recommend outpatient physical therapy services to address higher level deficits   Interdisciplinary Plan of Care Collaboration   IDT Collaboration with  Nursing   Patient Position at End of Therapy Seated;Call Light within Reach;Tray Table within Reach;Phone within Reach;Family / Friend in Room   Collaboration Comments aware of visit and recs   Session Information   Date / Session Number  6/18 eval only   Priority 0

## 2024-06-18 NOTE — ANESTHESIA PREPROCEDURE EVALUATION
Case: [id number] Date/Time: 06/18/24 0945    Procedure: ROBOTIC RIGHT TOTAL KNEE ARTHROPLASTY    Diagnosis: Primary osteoarthritis of right knee [M17.11]    Pre-op diagnosis: Primary osteoarthritis of right knee [M17.11]    Location: [place] [address] / SURGERY [facility]    Surgeons: Phoenix John M.D.          84 y.o.  male     CAD/stents, no symptoms, METS > 4.  EF 55%    Denies respiratory problems    Denies anesthesia problems    NPO    Allergies:  Atorvastatin and Statins [hmg-coa-r inhibitors]     Past Medical History:   Diagnosis Date    Allergy     Arm fracture, left 02/22/2022    Arthritis 01/04/2022    RA hands/knees    BPH (benign prostatic hyperplasia)     CAD (coronary artery disease)     cardiac stents x 2 2000, 2007 [facility] 2007 prox LAD    Cholelithiasis     CKD (chronic kidney disease) stage 3, GFR 30-59 ml/min     Per Problem List    Congestive heart failure (HCC) 2016 7/31/23-no edema. Follows [facility] Dr Lopez with [facility].    Dental disorder     missing teeth    Fall 02/22/2022    Left Arm Fracture    Fracture     History of bladder infections     History of coronary artery stent placement 08/01/2014    Prox LAD, also RCA    Hyperlipidemia     Hypertension 01/04/2022    medicated    Knee pain, bilateral 07/31/2023    left worse than right    Myocardial infarct (HCC) 2007    2 stents placed    Pre-diabetes 03/01/2021    Prediabetes     Per Problem List    Statin intolerance 06/26/2018    Umbilical hernia     no issues    Urinary bladder disorder 01/04/2022    Urinary incontinence 01/04/2022    resolved after prostate procedure    Vertigo         Social History     Socioeconomic History    Marital status:      Spouse name: Not on file    Number of children: Not on file    Years of education: Not on file    Highest education level: Not on file   Occupational History    Not on file   Tobacco Use    Smoking status: Never     Passive exposure: Never    Smokeless tobacco: Never     Tobacco comments:     continued abstinence   Vaping Use    Vaping status: Never Used   Substance and Sexual Activity    Alcohol use: Never     Comment: maybe a beer every 2 weeks    Drug use: Yes     Types: Marijuana, Inhaled     Comment: Occasional Marijuana use    Sexual activity: Never     Partners: Female   Other Topics Concern    Not on file   Social History Narrative    He is  to [place]. He relocated to [place] around 2015 from a small town in [place]. He is dissatisfied with living in [place] and [place]     Social Determinants of Health     Financial Resource Strain: Not on file   Food Insecurity: Not on file   Transportation Needs: Not on file   Physical Activity: Not on file   Stress: Not on file   Social Connections: Not on file   Intimate Partner Violence: Not on file   Housing Stability: Not on file        Past Surgical History:   Procedure Laterality Date    PB TOTAL KNEE ARTHROPLASTY Left 08/15/2023    Procedure: ARTHROPLASTY, KNEE, TOTAL;  Surgeon: Phoenix John M.D.;  Location: SURGERY [facility];  Service: Orthopedics    INJ/ASPIR-LARGE JT/BURSA W/O US GUIDE Right 08/15/2023    Procedure: RIGHT KNEE INJECTION;  Surgeon: Phoenix John M.D.;  Location: SURGERY [facility];  Service: Orthopedics    HUMERUS NAILING INTRAMEDULLARY N/A 03/05/2022    Procedure: LEFT HUMERAL INTRAMEDILLARY NAILING; LEFT ROTATOR CUFF REPAIR;  Surgeon: Vishal Louis M.D.;  Location: [facility];  Service: Orthopedics    [place] LASER VAPORIZATION SURGERY PROSTATE, COMP*  01/12/2022    Procedure: ELECTROVAPORIZATION, PROSTATE, TRANSURETHRAL;  Surgeon: Jamaal Liz M.D.;  Location: SURGERY [facility];  Service: Urology    CHOLECYSTECTOMY  2014    OTHER      OTHER CARDIAC SURGERY      Stents x2        Lab Results   Component Value Date/Time    SODIUM 142 06/05/2024 11:26 AM    POTASSIUM 4.7 06/05/2024 11:26 AM    CHLORIDE 108 06/05/2024 11:26 AM    CO2 25 06/05/2024  11:26 AM    GLUCOSE 96 2024 11:26 AM    BUN 22 2024 11:26 AM    CREATININE 1.19 2024 11:26 AM         Lab Results   Component Value Date/Time    WBC 6.9 2024 11:26 AM    RBC 4.40 (L) 2024 11:26 AM    HEMOGLOBIN 13.9 (L) 2024 11:26 AM    HEMATOCRIT 43.6 2024 11:26 AM    MCV 99.1 (H) 2024 11:26 AM    MCH 31.6 2024 11:26 AM    MCHC 31.9 (L) 2024 11:26 AM    MPV 11.2 2024 11:26 AM    NEUTSPOLYS 65.40 2023 10:02 AM    LYMPHOCYTES 19.90 (L) 2023 10:02 AM    MONOCYTES 9.40 2023 10:02 AM    EOSINOPHILS 4.00 2023 10:02 AM    BASOPHILS 1.00 2023 10:02 AM         Results for orders placed or performed in visit on 24   ECG   Result Value Ref Range    Report       [facility]    Test Date:  2024  Pt Name:    SARAH BETH ROCA               Department: [facility]  MRN:        [id number]                      Room:  Gender:     Male                         Technician:   :        1940                   Requested By:MARTIN ROMANO  Order #:    [id number]                    Reading MD: Zaheer Winter MD    Measurements  Intervals                                Axis  Rate:       47                           P:          39  WV:         147                          QRS:        71  QRSD:       135                          T:          33  QT:         466  QTc:        411    Interpretive Statements  Sinus bradycardia  Right bundle branch block  Compared to ECG 2023 10:36:40  No significant changes  Electronically Signed On 2024 12:13:16 PDT by Zaheer Winter MD            Relevant Problems   CARDIAC   (positive) CAD s/p MVPCI   (positive) Essential hypertension   (positive) Nonrheumatic aortic valve insufficiency      Other   (positive) Arthritis of left knee       Physical Exam    Airway   Mallampati: II  TM distance: >3 FB  Neck ROM: full       Cardiovascular - normal exam  Rhythm: regular  Rate: normal      Dental - normal exam           Pulmonary - normal exam     Abdominal    Neurological - normal exam                   Anesthesia Plan    ASA 3   ASA physical status 3 criteria: CAD/stents (> 3 months)    Plan - general and peripheral nerve block     Peripheral nerve block will be post-op pain control  Airway plan will be LMA          Induction: intravenous    Postoperative Plan: Postoperative administration of opioids is intended.    Pertinent diagnostic labs and testing reviewed    Informed Consent:    Anesthetic plan and risks discussed with patient.    Use of blood products discussed with: patient whom consented to blood products.

## 2024-06-18 NOTE — ANESTHESIA TIME REPORT
Anesthesia Start and Stop Event Times       Date Time Event    6/18/2024 0942 Ready for Procedure     0942 Anesthesia Start     1053 Anesthesia Stop          Responsible Staff  06/18/24      Name Role Begin End    Philippe Honeycutt M.D. Anesth 0942 1053          Overtime Reason:  no overtime (within assigned shift)    Comments:

## 2024-06-18 NOTE — H&P
Surgery Orthopedic History & Physical Note    Date  6/18/2024    Primary Care Physician  Mirna Charlton D.O.      Pre-Op Diagnosis Codes:     * Primary osteoarthritis of right knee [M17.11]    HPI  This is a 84 y.o. male who presents for a TKA.  His other knee replacement is doing well.    Past Medical History:   Diagnosis Date    Allergy     Arm fracture, left 02/22/2022    Arthritis 01/04/2022    RA hands/knees    BPH (benign prostatic hyperplasia)     CAD (coronary artery disease)     cardiac stents x 2 2000, 2007 [place] 2007 prox LAD    Cholelithiasis     CKD (chronic kidney disease) stage 3, GFR 30-59 ml/min     Per Problem List    Congestive heart failure (HCC) 2016    7/31/23-no edema. Follows mitra Lopez with [facility].    Dental disorder     missing teeth    Fall 02/22/2022    Left Arm Fracture    Fracture     History of bladder infections     History of coronary artery stent placement 08/01/2014    Prox LAD, also RCA    Hyperlipidemia     Hypertension 01/04/2022    medicated    Knee pain, bilateral 07/31/2023    left worse than right    Myocardial infarct (HCC) 2007    2 stents placed    Pre-diabetes 03/01/2021    Prediabetes     Per Problem List    Statin intolerance 06/26/2018    Umbilical hernia     no issues    Urinary bladder disorder 01/04/2022    Urinary incontinence 01/04/2022    resolved after prostate procedure    Vertigo        Past Surgical History:   Procedure Laterality Date    PB TOTAL KNEE ARTHROPLASTY Left 08/15/2023    Procedure: ARTHROPLASTY, KNEE, TOTAL;  Surgeon: Phoenix John M.D.;  Location: SURGERY [facility];  Service: Orthopedics    INJ/ASPIR-LARGE JT/BURSA W/O US GUIDE Right 08/15/2023    Procedure: RIGHT KNEE INJECTION;  Surgeon: Phoenix John M.D.;  Location: SURGERY [facility];  Service: Orthopedics    HUMERUS NAILING INTRAMEDULLARY N/A 03/05/2022    Procedure: LEFT HUMERAL INTRAMEDILLARY NAILING; LEFT ROTATOR CUFF REPAIR;  Surgeon: Vishal QUIJANO  PRIYANKA Louis;  Location: SURGERY [facility];  Service: Orthopedics    [place] LASER VAPORIZATION SURGERY PROSTATE, COMP*  01/12/2022    Procedure: ELECTROVAPORIZATION, PROSTATE, TRANSURETHRAL;  Surgeon: Jamaal Liz M.D.;  Location: SURGERY [facility];  Service: Urology    CHOLECYSTECTOMY  2014    OTHER      OTHER CARDIAC SURGERY      Stents x2       Current Facility-Administered Medications   Medication Dose Route Frequency Provider Last Rate Last Admin    ceFAZolin (Ancef) injection 2 g  2 g Intravenous Once Dwayne Lombardo P.A.-C.        lidocaine (Xylocaine) 1 % injection 0.5 mL  0.5 mL Intradermal Once PRN Phoenix John M.D.        lactated ringers infusion   Intravenous Continuous Phoenix John M.D. 10 mL/hr at 06/18/24 0803 New Bag at 06/18/24 0803       Social History     Socioeconomic History    Marital status:      Spouse name: Not on file    Number of children: Not on file    Years of education: Not on file    Highest education level: Not on file   Occupational History    Not on file   Tobacco Use    Smoking status: Never     Passive exposure: Never    Smokeless tobacco: Never    Tobacco comments:     continued abstinence   Vaping Use    Vaping status: Never Used   Substance and Sexual Activity    Alcohol use: Never     Comment: maybe a beer every 2 weeks    Drug use: Yes     Types: Marijuana, Inhaled     Comment: Occasional Marijuana use    Sexual activity: Never     Partners: Female   Other Topics Concern    Not on file   Social History Narrative    He is  to [place]. He relocated to [place] around 2015 from a small town in [place]. He is dissatisfied with living in [place] and [place]     Social Determinants of Health     Financial Resource Strain: Not on file   Food Insecurity: Not on file   Transportation Needs: Not on file   Physical Activity: Not on file   Stress: Not on file   Social Connections: Not on file   Intimate Partner Violence: Not on file    Housing Stability: Not on file       Family History   Problem Relation Age of Onset    Cancer Father 57        lung cancer    Cancer Brother 52        lung cancer    Cancer Sister         breast    Cancer Sister         breast    Cancer Sister         pancreatic    Hyperlipidemia Mother     Stroke Mother        Allergies  Atorvastatin and Statins [hmg-coa-r inhibitors]    Review of Systems  Negative    Physical Exam  Regular rate and rhythm  Nonlabored breathing  Abdomen soft and nontender   Neurovascular intact distally  Skin is in good condition    Vital Signs  Blood Pressure : (!) 165/69   Temperature: 36.6 °C (97.9 °F)   Pulse: (!) 53   Respiration: 20   Pulse Oximetry: 97 %       Labs:                    Radiology:  No orders to display         Assessment/Plan:  Pre-Op Diagnosis Codes:     * Primary osteoarthritis of right knee [M17.11]  Procedure(s):  ROBOTIC RIGHT TOTAL KNEE ARTHROPLASTY

## 2024-06-18 NOTE — OP REPORT
Preop Diagnosis: Advanced right knee arthritis  Postop Diagnosis:  Same  Procedure: Right total knee arthroplasty, Use of intraoperative robotic navigation for knee replacement  Surgeon: Dr. Phoenix John MD  Assistant: Dwayne Lombardo PA-C  Anesthesia: Dr. Honeycutt. General + Adductor canal block  Estimated Blood Loss: 50cc  Drains: None  Complications: None    Implants: [employer] Triathlon CR Cementless, size 6 femur, size 6 tibia, with a 9 mm CS insert and 38 oval patella.    Indications: Arcenio Barnett is a 84 y.o. male who has had severe progressive knee pain that failed conservative management.  There were severe degenerative changes on radiographs.  We discussed the options and he was ultimately indicated for knee replacement.  We discussed the risk of bleeding, transfusion, pain, neurovascular injury, stiffness, fracture, infection, wound complication, loosening of the parts over time, blood clots, and medical complication.  No guarantees were made and he elected to proceed.    Pertinent Findings and Releases: There were severe degenerative changes.  At the end of the case, the knee easily came to full extension and flexed up to calf/thigh impingement with the arthrotomy closed.  The patella tracked centrally.    Informed consent and site marking were confirmed in preop.  An adductor canal block had been performed by the anesthesiologist, and then he was brought back to the OR where anesthesia was performed. Supine positioning was perfmored with all bony prominences well padded with a padded tourniquet on the thigh.  The extremity was prepped and draped in the usual sterile fashion. I performed a pre-procedure timeout to confirm we had the correct patient, side, site, procedure, and presence of necessary personal and equipment.  I confirmed that Ancef and tranexamic acid were given.  The tourniquet was then inflated.    A midline incision was made medial to the tibial tubercle centered over patella taken down to the  deep capsule of the knee. A short medial parapatellar arthrotomy was performed.  The fat pad was released. The patella was subluxated and the knee was flexed. The remnants of the anterior horn of the medial and lateral meniscus were removed.  Two pins were placed into the proximal tibia and two in the femur within the incision to dock the robotic sensors.  The knee was then registered and taken through a range of motion to gauge the ligament balance.  The surgical plan was then modified on the computer, and all of the bone cuts were made without any difficulties.  The knee was then tensed at 90 degrees and the meniscal remnants and posterior osteophytes were removed.  The posterior capsule was injected with a local anesthetic cocktail.  The tibia was then subluxed forward, sized, and punched in the appropriate amount of external rotation and mechanical alignment was verified using a drop tiesha. Trials were placed, the knee was reduced with a trial insert, it was taken through a range of motion, and found to be stable in the varus/valgus plane 0-30 degrees of flexion and the AP plane at 90 degrees of flexion. Attention was then turned to the patella. A symmetric resection was performed to recreate native patella height and appropriately sized. All extraneous osteophyte and synovium were removed.  With a trial in place, the patella tracked centrally using the no thumbs technique. All trial components were removed. The real components were impacted with a great press-fit.  The tourniquet was let down and hemostasis ensured. The real insert was placed. Stability and patellar tracking were excellent. The knee was then copiously irrigated. One gram of Vancomycin was left in the wound.  The arthrotomy was closed with number 2 running barbed suture, and the wound was closed in layers. An occlusive silver dressing was applied and the patient was turned over to anesthesia in stable condition without any apparent intraoperative  complications.    Plan:  WBAT with a walker (which will need to be provided if they do not already have one due to weakness, imbalance, and fall risk), PT/OT evaluation, Aspirin 81mg BID for 4 weeks for DVT prophylaxis, Leave dressing in place until followup.

## 2024-06-18 NOTE — OR NURSING
1048: To PACU from OR via gurney, respirations spontaneous and non-labored post suctioning and chin lift by anesthesia provider. Icepack applied over c/d/i R knee surgical dressings. R DP+2, R foot pink/warm with brisk CRF.  1100: Rouses to name but does not respond verbally, pt reaches for face and encouraged to not touch eyes or remove O2 mask.  1115: rouses to name, denies pain/nausea. O2 d/paty  1130: More awake, DB+C, continues to deny pain. PLan IS  1135: Report to [place]ano DAVENPORT RN  1152: Care resumed of pt on 1 LPM n/c, sipping on juice  1200: O2 d/paty. Able to inhale goal of 2000cc on IS  1215: Tolerates RA.  1220: Patient stood at side of bed with CNA assist and walker for safety. Patient marched in place. Patient has adequate lower extremity function to participate in PT in Stage 2  1221: Ambulating to Stage 2 w/CNA and using front wheel walker

## 2024-06-18 NOTE — ANESTHESIA POSTPROCEDURE EVALUATION
Patient: Arcenio Barnett    Procedure Summary       Date: 06/18/24 Room / Location:  OR 06 / SURGERY [facility]    Anesthesia Start: 0942 Anesthesia Stop: 1053    Procedure: ROBOTIC RIGHT TOTAL KNEE ARTHROPLASTY (Right: Knee) Diagnosis:       Primary osteoarthritis of right knee      (Primary osteoarthritis of right knee [M17.11])    Surgeons: Phoenix John M.D. Responsible Provider: Philippe Honeycutt M.D.    Anesthesia Type: general, peripheral nerve block ASA Status: 3            Final Anesthesia Type: general, peripheral nerve block  Last vitals  BP   Blood Pressure : (!) 159/87    Temp   36.1 °C (97 °F)    Pulse   62   Resp   16    SpO2   97 %      Anesthesia Post Evaluation    Patient location during evaluation: PACU  Patient participation: complete - patient participated  Level of consciousness: awake and alert    Airway patency: patent  Anesthetic complications: no  Cardiovascular status: hemodynamically stable  Respiratory status: acceptable  Hydration status: euvolemic    PONV: none          There were no known notable events for this encounter.     Nurse Pain Score: 0 (NPRS)

## 2024-07-01 ENCOUNTER — APPOINTMENT (OUTPATIENT)
Dept: MEDICAL GROUP | Facility: PHYSICIAN GROUP | Age: 84
End: 2024-07-01
Payer: MEDICARE

## 2024-07-01 VITALS
DIASTOLIC BLOOD PRESSURE: 60 MMHG | SYSTOLIC BLOOD PRESSURE: 126 MMHG | TEMPERATURE: 98.2 F | OXYGEN SATURATION: 95 % | BODY MASS INDEX: 21.92 KG/M2 | HEART RATE: 67 BPM | WEIGHT: 136.4 LBS | HEIGHT: 66 IN

## 2024-07-01 DIAGNOSIS — I25.10 CORONARY ARTERY DISEASE INVOLVING NATIVE CORONARY ARTERY OF NATIVE HEART WITHOUT ANGINA PECTORIS: ICD-10-CM

## 2024-07-01 DIAGNOSIS — M17.0 BILATERAL PRIMARY OSTEOARTHRITIS OF KNEE: ICD-10-CM

## 2024-07-01 DIAGNOSIS — R73.03 PREDIABETES: ICD-10-CM

## 2024-07-01 DIAGNOSIS — L30.9 DERMATITIS: ICD-10-CM

## 2024-07-01 DIAGNOSIS — E78.5 DYSLIPIDEMIA: ICD-10-CM

## 2024-07-01 DIAGNOSIS — I10 ESSENTIAL HYPERTENSION: ICD-10-CM

## 2024-07-01 PROCEDURE — G2211 COMPLEX E/M VISIT ADD ON: HCPCS | Performed by: INTERNAL MEDICINE

## 2024-07-01 PROCEDURE — 99214 OFFICE O/P EST MOD 30 MIN: CPT | Performed by: INTERNAL MEDICINE

## 2024-07-01 PROCEDURE — 3078F DIAST BP <80 MM HG: CPT | Performed by: INTERNAL MEDICINE

## 2024-07-01 PROCEDURE — 3074F SYST BP LT 130 MM HG: CPT | Performed by: INTERNAL MEDICINE

## 2024-07-01 RX ORDER — EZETIMIBE 10 MG/1
10 TABLET ORAL DAILY
Qty: 100 TABLET | Refills: 3 | Status: SHIPPED | OUTPATIENT
Start: 2024-07-01

## 2024-07-01 RX ORDER — CARVEDILOL 12.5 MG/1
12.5 TABLET ORAL 2 TIMES DAILY WITH MEALS
Qty: 200 TABLET | Refills: 0 | Status: SHIPPED | OUTPATIENT
Start: 2024-07-01

## 2024-07-01 ASSESSMENT — FIBROSIS 4 INDEX: FIB4 SCORE: 2.48

## 2024-10-01 ENCOUNTER — APPOINTMENT (OUTPATIENT)
Dept: MEDICAL GROUP | Facility: PHYSICIAN GROUP | Age: 84
End: 2024-10-01
Payer: MEDICARE

## 2024-10-14 ENCOUNTER — HOSPITAL ENCOUNTER (OUTPATIENT)
Dept: LAB | Facility: MEDICAL CENTER | Age: 84
End: 2024-10-14
Attending: INTERNAL MEDICINE
Payer: MEDICARE

## 2024-10-14 DIAGNOSIS — R73.03 PREDIABETES: ICD-10-CM

## 2024-10-14 DIAGNOSIS — E78.5 DYSLIPIDEMIA: ICD-10-CM

## 2024-10-14 DIAGNOSIS — E61.1 IRON DEFICIENCY: ICD-10-CM

## 2024-10-14 DIAGNOSIS — I10 ESSENTIAL HYPERTENSION: ICD-10-CM

## 2024-10-14 LAB
25(OH)D3 SERPL-MCNC: 38 NG/ML (ref 30–100)
ALBUMIN SERPL BCP-MCNC: 4 G/DL (ref 3.2–4.9)
ALBUMIN/GLOB SERPL: 1.5 G/DL
ALP SERPL-CCNC: 55 U/L (ref 30–99)
ALT SERPL-CCNC: 18 U/L (ref 2–50)
ANION GAP SERPL CALC-SCNC: 7 MMOL/L (ref 7–16)
AST SERPL-CCNC: 23 U/L (ref 12–45)
BASOPHILS # BLD AUTO: 1 % (ref 0–1.8)
BASOPHILS # BLD: 0.06 K/UL (ref 0–0.12)
BILIRUB SERPL-MCNC: 0.6 MG/DL (ref 0.1–1.5)
BUN SERPL-MCNC: 23 MG/DL (ref 8–22)
CALCIUM ALBUM COR SERPL-MCNC: 9.3 MG/DL (ref 8.5–10.5)
CALCIUM SERPL-MCNC: 9.3 MG/DL (ref 8.5–10.5)
CHLORIDE SERPL-SCNC: 107 MMOL/L (ref 96–112)
CHOLEST SERPL-MCNC: 173 MG/DL (ref 100–199)
CO2 SERPL-SCNC: 26 MMOL/L (ref 20–33)
CREAT SERPL-MCNC: 1.17 MG/DL (ref 0.5–1.4)
CREAT UR-MCNC: 129.63 MG/DL
EOSINOPHIL # BLD AUTO: 0.37 K/UL (ref 0–0.51)
EOSINOPHIL NFR BLD: 6.3 % (ref 0–6.9)
ERYTHROCYTE [DISTWIDTH] IN BLOOD BY AUTOMATED COUNT: 46.5 FL (ref 35.9–50)
EST. AVERAGE GLUCOSE BLD GHB EST-MCNC: 123 MG/DL
FASTING STATUS PATIENT QL REPORTED: NORMAL
FERRITIN SERPL-MCNC: 177 NG/ML (ref 22–322)
GFR SERPLBLD CREATININE-BSD FMLA CKD-EPI: 61 ML/MIN/1.73 M 2
GLOBULIN SER CALC-MCNC: 2.6 G/DL (ref 1.9–3.5)
GLUCOSE SERPL-MCNC: 104 MG/DL (ref 65–99)
HBA1C MFR BLD: 5.9 % (ref 4–5.6)
HCT VFR BLD AUTO: 42.2 % (ref 42–52)
HDLC SERPL-MCNC: 68 MG/DL
HGB BLD-MCNC: 14.1 G/DL (ref 14–18)
IMM GRANULOCYTES # BLD AUTO: 0.02 K/UL (ref 0–0.11)
IMM GRANULOCYTES NFR BLD AUTO: 0.3 % (ref 0–0.9)
IRON SATN MFR SERPL: 42 % (ref 15–55)
IRON SERPL-MCNC: 99 UG/DL (ref 50–180)
LDLC SERPL CALC-MCNC: 93 MG/DL
LYMPHOCYTES # BLD AUTO: 1.24 K/UL (ref 1–4.8)
LYMPHOCYTES NFR BLD: 21.1 % (ref 22–41)
MCH RBC QN AUTO: 31.5 PG (ref 27–33)
MCHC RBC AUTO-ENTMCNC: 33.4 G/DL (ref 32.3–36.5)
MCV RBC AUTO: 94.4 FL (ref 81.4–97.8)
MICROALBUMIN UR-MCNC: 2.8 MG/DL
MICROALBUMIN/CREAT UR: 22 MG/G (ref 0–30)
MONOCYTES # BLD AUTO: 0.65 K/UL (ref 0–0.85)
MONOCYTES NFR BLD AUTO: 11.1 % (ref 0–13.4)
NEUTROPHILS # BLD AUTO: 3.54 K/UL (ref 1.82–7.42)
NEUTROPHILS NFR BLD: 60.2 % (ref 44–72)
NRBC # BLD AUTO: 0 K/UL
NRBC BLD-RTO: 0 /100 WBC (ref 0–0.2)
PLATELET # BLD AUTO: 192 K/UL (ref 164–446)
PMV BLD AUTO: 11 FL (ref 9–12.9)
POTASSIUM SERPL-SCNC: 4.5 MMOL/L (ref 3.6–5.5)
PROT SERPL-MCNC: 6.6 G/DL (ref 6–8.2)
RBC # BLD AUTO: 4.47 M/UL (ref 4.7–6.1)
SODIUM SERPL-SCNC: 140 MMOL/L (ref 135–145)
T4 FREE SERPL-MCNC: 1.13 NG/DL (ref 0.93–1.7)
TIBC SERPL-MCNC: 233 UG/DL (ref 250–450)
TRIGL SERPL-MCNC: 58 MG/DL (ref 0–149)
TSH SERPL-ACNC: 1.81 UIU/ML (ref 0.35–5.5)
UIBC SERPL-MCNC: 134 UG/DL (ref 110–370)
VIT B12 SERPL-MCNC: 800 PG/ML (ref 211–911)
WBC # BLD AUTO: 5.9 K/UL (ref 4.8–10.8)

## 2024-10-14 PROCEDURE — 82043 UR ALBUMIN QUANTITATIVE: CPT

## 2024-10-14 PROCEDURE — 84443 ASSAY THYROID STIM HORMONE: CPT

## 2024-10-14 PROCEDURE — 83540 ASSAY OF IRON: CPT

## 2024-10-14 PROCEDURE — 36415 COLL VENOUS BLD VENIPUNCTURE: CPT

## 2024-10-14 PROCEDURE — 82306 VITAMIN D 25 HYDROXY: CPT

## 2024-10-14 PROCEDURE — 80061 LIPID PANEL: CPT

## 2024-10-14 PROCEDURE — 80053 COMPREHEN METABOLIC PANEL: CPT

## 2024-10-14 PROCEDURE — 82607 VITAMIN B-12: CPT

## 2024-10-14 PROCEDURE — 83550 IRON BINDING TEST: CPT

## 2024-10-14 PROCEDURE — 84439 ASSAY OF FREE THYROXINE: CPT

## 2024-10-14 PROCEDURE — 83036 HEMOGLOBIN GLYCOSYLATED A1C: CPT

## 2024-10-14 PROCEDURE — 82728 ASSAY OF FERRITIN: CPT

## 2024-10-14 PROCEDURE — 82570 ASSAY OF URINE CREATININE: CPT

## 2024-10-14 PROCEDURE — 85025 COMPLETE CBC W/AUTO DIFF WBC: CPT

## 2024-10-15 ENCOUNTER — APPOINTMENT (OUTPATIENT)
Dept: MEDICAL GROUP | Facility: PHYSICIAN GROUP | Age: 84
End: 2024-10-15
Payer: MEDICARE

## 2024-10-15 VITALS
RESPIRATION RATE: 16 BRPM | BODY MASS INDEX: 21.53 KG/M2 | WEIGHT: 134 LBS | HEIGHT: 66 IN | DIASTOLIC BLOOD PRESSURE: 78 MMHG | OXYGEN SATURATION: 96 % | SYSTOLIC BLOOD PRESSURE: 130 MMHG | TEMPERATURE: 97.7 F | HEART RATE: 62 BPM

## 2024-10-15 DIAGNOSIS — F12.90 MARIJUANA USE: ICD-10-CM

## 2024-10-15 DIAGNOSIS — M54.2 NECK PAIN: ICD-10-CM

## 2024-10-15 DIAGNOSIS — F33.1 MODERATE EPISODE OF RECURRENT MAJOR DEPRESSIVE DISORDER (HCC): ICD-10-CM

## 2024-10-15 DIAGNOSIS — F51.01 PRIMARY INSOMNIA: ICD-10-CM

## 2024-10-15 DIAGNOSIS — Z23 NEED FOR VACCINATION: ICD-10-CM

## 2024-10-15 PROCEDURE — 3078F DIAST BP <80 MM HG: CPT | Performed by: INTERNAL MEDICINE

## 2024-10-15 PROCEDURE — 99214 OFFICE O/P EST MOD 30 MIN: CPT | Mod: 25 | Performed by: INTERNAL MEDICINE

## 2024-10-15 PROCEDURE — 90662 IIV NO PRSV INCREASED AG IM: CPT | Performed by: INTERNAL MEDICINE

## 2024-10-15 PROCEDURE — 3075F SYST BP GE 130 - 139MM HG: CPT | Performed by: INTERNAL MEDICINE

## 2024-10-15 PROCEDURE — G0008 ADMIN INFLUENZA VIRUS VAC: HCPCS | Performed by: INTERNAL MEDICINE

## 2024-10-15 RX ORDER — TRAZODONE HYDROCHLORIDE 50 MG/1
TABLET, FILM COATED ORAL
Qty: 81 TABLET | Refills: 0 | Status: SHIPPED | OUTPATIENT
Start: 2024-10-15 | End: 2024-11-14

## 2024-10-15 ASSESSMENT — FIBROSIS 4 INDEX: FIB4 SCORE: 2.37

## 2024-10-22 ENCOUNTER — APPOINTMENT (OUTPATIENT)
Dept: RADIOLOGY | Facility: MEDICAL CENTER | Age: 84
End: 2024-10-22
Attending: INTERNAL MEDICINE
Payer: MEDICARE

## 2024-10-22 DIAGNOSIS — M54.2 NECK PAIN: ICD-10-CM

## 2024-10-22 PROCEDURE — 72040 X-RAY EXAM NECK SPINE 2-3 VW: CPT

## 2024-10-29 ENCOUNTER — TELEPHONE (OUTPATIENT)
Dept: MEDICAL GROUP | Facility: PHYSICIAN GROUP | Age: 84
End: 2024-10-29
Payer: MEDICARE

## 2024-10-30 ENCOUNTER — TELEPHONE (OUTPATIENT)
Dept: MEDICAL GROUP | Facility: PHYSICIAN GROUP | Age: 84
End: 2024-10-30
Payer: MEDICARE

## 2024-10-30 DIAGNOSIS — M47.812 ARTHRITIS OF FACET JOINT OF CERVICAL SPINE: ICD-10-CM

## 2024-10-30 DIAGNOSIS — I10 ESSENTIAL HYPERTENSION: ICD-10-CM

## 2024-10-30 DIAGNOSIS — I25.10 CORONARY ARTERY DISEASE INVOLVING NATIVE CORONARY ARTERY OF NATIVE HEART WITHOUT ANGINA PECTORIS: ICD-10-CM

## 2024-10-31 RX ORDER — CARVEDILOL 12.5 MG/1
12.5 TABLET ORAL 2 TIMES DAILY WITH MEALS
Qty: 200 TABLET | Refills: 3 | Status: SHIPPED | OUTPATIENT
Start: 2024-10-31

## 2024-11-12 ENCOUNTER — APPOINTMENT (OUTPATIENT)
Dept: CARDIOLOGY | Facility: MEDICAL CENTER | Age: 84
End: 2024-11-12
Attending: PHYSICIAN ASSISTANT
Payer: MEDICARE

## 2025-01-15 ENCOUNTER — APPOINTMENT (OUTPATIENT)
Dept: MEDICAL GROUP | Facility: PHYSICIAN GROUP | Age: 85
End: 2025-01-15
Payer: MEDICARE

## 2025-01-15 VITALS
OXYGEN SATURATION: 98 % | TEMPERATURE: 98.1 F | SYSTOLIC BLOOD PRESSURE: 110 MMHG | RESPIRATION RATE: 16 BRPM | DIASTOLIC BLOOD PRESSURE: 80 MMHG | HEIGHT: 66 IN | BODY MASS INDEX: 22.28 KG/M2 | WEIGHT: 138.6 LBS | HEART RATE: 46 BPM

## 2025-01-15 DIAGNOSIS — R00.1 BRADYCARDIA: ICD-10-CM

## 2025-01-15 DIAGNOSIS — E78.5 DYSLIPIDEMIA: ICD-10-CM

## 2025-01-15 DIAGNOSIS — M43.12 ANTEROLISTHESIS OF CERVICAL SPINE: ICD-10-CM

## 2025-01-15 DIAGNOSIS — M47.812 FACET ARTHROPATHY, CERVICAL: ICD-10-CM

## 2025-01-15 DIAGNOSIS — R73.03 PREDIABETES: ICD-10-CM

## 2025-01-15 DIAGNOSIS — S42.292G OTHER CLOSED DISPLACED FRACTURE OF PROXIMAL END OF LEFT HUMERUS WITH DELAYED HEALING, SUBSEQUENT ENCOUNTER: ICD-10-CM

## 2025-01-15 DIAGNOSIS — F32.5 MAJOR DEPRESSIVE DISORDER WITH SINGLE EPISODE, IN FULL REMISSION (HCC): ICD-10-CM

## 2025-01-15 PROBLEM — S42.202G CLOSED FRACTURE OF PROXIMAL END OF LEFT HUMERUS WITH DELAYED HEALING: Status: ACTIVE | Noted: 2022-02-28

## 2025-01-15 PROCEDURE — G2211 COMPLEX E/M VISIT ADD ON: HCPCS | Performed by: INTERNAL MEDICINE

## 2025-01-15 PROCEDURE — 3074F SYST BP LT 130 MM HG: CPT | Performed by: INTERNAL MEDICINE

## 2025-01-15 PROCEDURE — 3079F DIAST BP 80-89 MM HG: CPT | Performed by: INTERNAL MEDICINE

## 2025-01-15 PROCEDURE — 99214 OFFICE O/P EST MOD 30 MIN: CPT | Performed by: INTERNAL MEDICINE

## 2025-01-15 ASSESSMENT — FIBROSIS 4 INDEX: FIB4 SCORE: 2.4

## 2025-01-15 ASSESSMENT — PATIENT HEALTH QUESTIONNAIRE - PHQ9: CLINICAL INTERPRETATION OF PHQ2 SCORE: 0

## 2025-01-16 NOTE — PROGRESS NOTES
Subjective:   Chief Complaint/History of Present Illness:  Arcenio Barnett is a 85 y.o. male established patient who presents today to discuss medical problems as listed below. Arcenio is unaccompanied for today's visit.    History of Present Illness  The patient presents for evaluation of neck pain.    He reports an improvement in his mood over the past few months. He has been experiencing persistent neck pain, which he has been managing with Tylenol. However, since the onset of the new year, he has had episodes of severe pain radiating from his neck to his shoulders and down his arms, disrupting his sleep. The pain is so intense that it immobilizes him, although there is a slight improvement during the day. He expresses interest in exploring injection therapy for his neck pain. He has not previously undergone any epidural injections for his cervical spine or elsewhere. He reports no weakness in his hands due to the pain. He has not engaged in any physical therapy specifically targeting his neck and has not previously tried physical therapy.    He has a history of humerus fracture, which was surgically repaired with a plate and screw in 03/2022. He can feel the screw protruding and recalls a fall on the deck, which did not cause immediate pain but resulted in a large bruise. The prominence of the screw has remained consistent over the past 2 years and does not interfere with his sleep. He occasionally hears noises when lying on that side but reports no associated pain.    He has a history of right knee replacement surgery performed by Dr. John, followed by a left knee replacement approximately 1.5 years ago. He underwent physical therapy at [facility] following his first knee replacement and opted for home exercises after his second knee replacement, which have not caused him any discomfort.    He reports no symptoms of lightheadedness or dizziness. He recalls an incident from his youth when he was asked  "if he was an athlete due to his low heart rate, which was around 49 or 50.    MEDICATIONS  Current: carvedilol          Current Medications:  Current Outpatient Medications Ordered in Epic   Medication Sig Dispense Refill    carvedilol (COREG) 12.5 MG Tab TAKE 1 TABLET BY MOUTH TWICE DAILY WITH MEALS 200 Tablet 3    ezetimibe (ZETIA) 10 MG Tab Take 1 Tablet by mouth every day. 100 Tablet 3    aspirin (ASA) 81 MG Chew Tab chewable tablet Chew 81 mg 2 times a day.      Multiple Vitamins-Minerals (CENTRUM SILVER 50+MEN PO) Take 1 Tablet by mouth every day. DO NOT TAKE 7 DAYS PRIOR TO SURGERY       No current Epic-ordered facility-administered medications on file.          Objective:   Physical Exam:    Vitals: /80 (BP Location: Right arm, Patient Position: Sitting, BP Cuff Size: Adult)   Pulse (!) 46   Temp 36.7 °C (98.1 °F) (Temporal)   Resp 16   Ht 1.676 m (5' 6\")   Wt 62.9 kg (138 lb 9.6 oz)   SpO2 98%    BMI: Body mass index is 22.37 kg/m².  Physical Exam  Constitutional:       General: He is not in acute distress.     Appearance: Normal appearance. He is not ill-appearing.   HENT:      Right Ear: Ear canal and external ear normal. There is no impacted cerumen.      Left Ear: Ear canal and external ear normal. There is no impacted cerumen.   Eyes:      General: No scleral icterus.     Conjunctiva/sclera: Conjunctivae normal.   Neck:      Comments: Neck stiffness and decreased active ROM due to pain  Cardiovascular:      Rate and Rhythm: Regular rhythm. Bradycardia present.      Pulses: Normal pulses.   Pulmonary:      Effort: Pulmonary effort is normal. No respiratory distress.      Comments: Mildly coarse breath sounds on expiration  Abdominal:      General: Bowel sounds are normal. There is no distension.      Palpations: Abdomen is soft.   Musculoskeletal:      Right lower leg: No edema.      Left lower leg: No edema.   Skin:     General: Skin is warm and dry.      Findings: No rash.      Comments: " Palpable screw left proximal humerus   Neurological:      Gait: Gait normal.   Psychiatric:         Mood and Affect: Mood normal.         Behavior: Behavior normal.         Thought Content: Thought content normal.         Judgment: Judgment normal.           Results  Laboratory Studies  Blood work done in October 2024 showed prediabetes was improving, cholesterol levels were good, and vitamin levels were good.    Imaging  X-ray of neck showed severe joint space loss, facet arthropathy.    Assessment & Plan  1. Cervical facet arthropathy, severe  2. Anterolisthesis of cervical spine   The patient's cervicalgia is likely due to severe arthritis in the neck, as evidenced by the radiographic findings of significant joint space loss and facet arthropathy. He reports that the pain radiates from the neck to the shoulders and down the arms, which has been particularly severe since the new year. He has been managing the pain with Tylenol but is interested in exploring other treatment options. Facet injections with steroids were discussed as a potential treatment to decrease inflammation and provide pain relief for a couple of months. Additionally, an ablation procedure was discussed, which could offer prolonged relief by targeting and burning the nerves causing the most pain. This procedure could provide relief for up to a year or more. The patient was informed about the initial lidocaine trial required before the actual ablation procedure to determine the correct nerve target. An MRI of the cervical spine will be ordered to further evaluate the condition. A referral to Dr[facility] Lan Garcia at [facility] for potential ablation therapy will also be made.    3. Status post left humerus fracture complicated by lateral migration of 1 screw.  The patient has a history of a humerus fracture that was surgically repaired with a plate and screws in March 2022. He reports that one of the screws is prominently protruding but does not  cause pain or interfere with daily activities. The patient was informed that the hardware could be removed if it becomes problematic, but currently, it does not seem to contribute to his pain. Last saw Dr. Louis in December 2022.    4. Bradycardia.  The patient's heart rate was noted to be slightly low but regular. He reports no symptoms of lightheadedness or dizziness. The patient was informed that the medication carvedilol can lower the heart rate slightly. No immediate intervention is required unless he starts experiencing symptoms.    5. Prediabetes  6. Dyslipidemia  Chronic and stable, A1c improved but still prediabetic so will monitor twice per year. Continue ezetimibe 10 mg daily for cholesterol and update lab work before follow up.     7. Major depression in full remission   Previous problem, improved, mood doing better without pharmacologic intervention. Agreeable to ongoing observation. He feels if his neck pain could be improved that would also help with his mood.      Follow-up  The patient will follow up in May 2025.    PROCEDURE  The patient underwent a right knee replacement surgery performed by Dr. John, followed by a left knee replacement approximately 1.5 years ago. He also had a humerus fracture surgically repaired with a plate and screw in 03/2022.      Assessment and Plan:   Arcenio is a 85 y.o. male with the following:  Problem List Items Addressed This Visit       Anterolisthesis of cervical spine    Relevant Orders    MR-CERVICAL SPINE-W/O    Referral to Physical Therapy    Referral to Pain Clinic    Bradycardia    Closed fracture of proximal end of left humerus with prominent screw    Dyslipidemia    Facet arthropathy, cervical    Relevant Orders    MR-CERVICAL SPINE-W/O    Referral to Physical Therapy    Referral to Pain Clinic    Major depressive disorder with single episode, in full remission (HCC)    Prediabetes          RTC: Return in about 4 months (around 5/15/2025) for AWV.    I  spent a total of 31 minutes with record review, exam, communication with the patient, communication with other providers, and documentation of this encounter.    Verbal consent was acquired by the patient to use [email or web address] ambient listening note generation during this visit Yes     Billing : secondary to the complexity of this patient's illnesses and their interactions.  All problems listed were discussed during the office visit, medications were evaluated and complexities were discussed as well as plan for the future.     PLEASE NOTE: This dictation was created using voice recognition software. I have made every reasonable attempt to correct obvious errors, but I expect that there are errors of grammar and possibly content that I did not discover before finalizing the note.      Mirna Charlton, DO  Geriatric and Internal Medicine  [facility]

## 2025-01-22 ENCOUNTER — TELEPHONE (OUTPATIENT)
Dept: HEALTH INFORMATION MANAGEMENT | Facility: OTHER | Age: 85
End: 2025-01-22
Payer: MEDICARE

## 2025-01-24 ENCOUNTER — PATIENT MESSAGE (OUTPATIENT)
Dept: SCHEDULING | Facility: IMAGING CENTER | Age: 85
End: 2025-01-24
Payer: MEDICARE

## 2025-03-24 ENCOUNTER — PATIENT MESSAGE (OUTPATIENT)
Dept: HEALTH INFORMATION MANAGEMENT | Facility: OTHER | Age: 85
End: 2025-03-24

## (undated) DEVICE — LACTATED RINGERS INJ 1000 ML - (14EA/CA 60CA/PF)

## (undated) DEVICE — GLOVE BIOGEL INDICATOR SZ 8 SURGICAL PF LTX - (50/BX 4BX/CA)

## (undated) DEVICE — TUBING CLEARLINK DUO-VENT - C-FLO (48EA/CA)

## (undated) DEVICE — SUTURE 0 VICRYL PLUS CT-1 - 8 X 18 INCH (12/BX)

## (undated) DEVICE — GOWN WARMING STANDARD FLEX - (30/CA)

## (undated) DEVICE — BAG SPONGE COUNT 10.25 X 32 - BLUE (250/CA)

## (undated) DEVICE — BANDAGE ELASTIC STERILE VELCRO 6 X 5 YDS (25EA/CA)

## (undated) DEVICE — PAD PREP 24 X 48 CUFFED - (100/CA)

## (undated) DEVICE — SUTURE GENERAL

## (undated) DEVICE — SET LEADWIRE 5 LEAD BEDSIDE DISPOSABLE ECG (1SET OF 5/EA)

## (undated) DEVICE — NEPTUNE 4 PORT MANIFOLD - (20/PK)

## (undated) DEVICE — GOWN SURGEONS X-LARGE - DISP. (30/CA)

## (undated) DEVICE — TOWELS CLOTH SURGICAL - (4/PK 20PK/CA)

## (undated) DEVICE — GLOVE BIOGEL SZ 7.5 SURGICAL PF LTX - (50PR/BX 4BX/CA)

## (undated) DEVICE — GLOVE BIOGEL ECLIPSE PF LATEX SIZE 8.0  (50PR/BX)

## (undated) DEVICE — TUBE E-T HI-LO CUFF 7.5MM (10EA/PK)

## (undated) DEVICE — ELECTRODE DUAL RETURN W/ CORD - (50/PK)

## (undated) DEVICE — SUCTION INSTRUMENT YANKAUER BULBOUS TIP W/O VENT (50EA/CA)

## (undated) DEVICE — SYSTEM NAVIGATION VIZADISC KNEE PROCEDURE TRACKING KIT (1EA)

## (undated) DEVICE — BLADE SAGITTAL 34MM

## (undated) DEVICE — SET EXTENSION WITH 2 PORTS (48EA/CA) ***PART #2C8610 IS A SUBSTITUTE*****

## (undated) DEVICE — SUTURE 2-0 MONOCRYL PLUS UNDYED CT-1 1 X 36 (36EA/BX)"

## (undated) DEVICE — DERMABOND ADVANCED - (12EA/BX)

## (undated) DEVICE — CHLORAPREP 26 ML APPLICATOR - ORANGE TINT(25/CA)

## (undated) DEVICE — SUTURE 3-0 MONOCRYL PLUS PS-1 - 27 INCH (36/BX)

## (undated) DEVICE — MASK ANESTHESIA ADULT  - (100/CA)

## (undated) DEVICE — PACK MAJOR ORTHO - (2EA/CA)

## (undated) DEVICE — STOCKINETTE IMPERVIOUS 12X48 - STERILELF (10/CA)"

## (undated) DEVICE — HEAD HOLDER JUNIOR/ADULT

## (undated) DEVICE — SYRINGE 30 ML LL (56/BX)

## (undated) DEVICE — SLEEVE VASO CALF MED - (10PR/CA)

## (undated) DEVICE — DRAPE STRLE REG TOWEL 18X24 - (10/BX 4BX/CA)"

## (undated) DEVICE — TIP INTPLS HFLO ML ORFC BTRY - (12/CS)  FOR SURGILAV

## (undated) DEVICE — BLADE SAGITTAL SAW DUAL CUT 25.0 X 90.0 X 1.27MM (1/EA)

## (undated) DEVICE — TOWEL STOP TIMEOUT SAFETY FLAG (40EA/CA)

## (undated) DEVICE — Device

## (undated) DEVICE — GLOVE BIOGEL INDICATOR SZ 6.5 SURGICAL PF LTX - (50PR/BX 4BX/CA)

## (undated) DEVICE — GLOVE SIZE 7.0 SURGEON ACCELERATOR FREE GREEN (50PR/BX 4BX/CA)

## (undated) DEVICE — SUTURE 5 ETHIBOND V-37 (12PK/BX)

## (undated) DEVICE — HANDPIECE 10FT INTPLS SCT PLS IRRIGATION HAND CONTROL SET (6/PK)

## (undated) DEVICE — JELLY SURGILUBE STERILE TUBE 4.25 OZ (1/EA)

## (undated) DEVICE — SENSOR SPO2 NEO LNCS ADHESIVE (20/BX) SEE USER NOTES

## (undated) DEVICE — BAG DRAINAGE ANTIREFLUX TOWER SLIDE TAP 4000 ML (20EA/CA)

## (undated) DEVICE — GLOVE BIOGEL PI INDICATOR SZ 8.0 SURGICAL PF LF -(50/BX 4BX/CA)

## (undated) DEVICE — CANISTER SUCTION 3000ML MECHANICAL FILTER AUTO SHUTOFF MEDI-VAC NONSTERILE LF DISP  (40EA/CA)

## (undated) DEVICE — SODIUM CHL IRRIGATION 0.9% 1000ML (12EA/CA)

## (undated) DEVICE — SENSOR OXIMETER ADULT SPO2 RD SET (20EA/BX)

## (undated) DEVICE — PIN HEADLESS 3.2 X 89MM

## (undated) DEVICE — DRAPE LARGE 3 QUARTER - (20/CA)

## (undated) DEVICE — SOD. CHL. INJ. 0.9% 1000 ML - (14EA/CA 60CA/PF)

## (undated) DEVICE — CONNECTOR HOSE NEPTUNE FOR CYSTO ROOM

## (undated) DEVICE — SPONGE GAUZESTER 4 X 4 4PLY - (128PK/CA)

## (undated) DEVICE — SET IRRIGATION CYSTOSCOPY Y-TYPE L81 IN (20EA/CA)

## (undated) DEVICE — CANISTER SUCTION RIGID RED 1500CC (40EA/CA)

## (undated) DEVICE — BLADE 90X18X1.27MM SAW SAGITTAL

## (undated) DEVICE — GLOVE BIOGEL SZ 6.5 SURGICAL PF LTX (50PR/BX 4BX/CA)

## (undated) DEVICE — ELECTRODE 850 FOAM ADHESIVE - HYDROGEL RADIOTRNSPRNT (50/PK)

## (undated) DEVICE — PACK CYSTOSCOPY III - (8/CA)

## (undated) DEVICE — WATER IRRIGATION STERILE 1000ML (12EA/CA)

## (undated) DEVICE — SLEEVE, VASO, THIGH, MED

## (undated) DEVICE — DRAPE IOBAN II INCISE 23X17 - (10EA/BX 4BX/CA)

## (undated) DEVICE — DRAPE SURGICAL U 77X120 - (10/CA)

## (undated) DEVICE — PIN FIXATION BONE STERILE 3.2MM X 110MM (2EA/PK)

## (undated) DEVICE — SODIUM CHL. IRRIGATION 0.9% 3000ML (4EA/CA 65CA/PF)

## (undated) DEVICE — GOWN SURGICAL X-LARGE ULTRA - FILM-REINFORCED (20/CA)

## (undated) DEVICE — PROTECTOR ULNA NERVE - (36PR/CA)

## (undated) DEVICE — KIT ANESTHESIA W/CIRCUIT & 3/LT BAG W/FILTER (20EA/CA)

## (undated) DEVICE — PAD LAP STERILE 18 X 18 - (5/PK 40PK/CA)

## (undated) DEVICE — FIBER GREEN LIGHT SINGLE USE

## (undated) DEVICE — BOVIE BLADE COATED - (50/PK)

## (undated) DEVICE — CATHETER FOLEY 22 FR 30 CC (12EA/CA)

## (undated) DEVICE — ELECTRODE BIPOLAR REGULAR CUTTING LOOP URO 24/26 FR (6EA/PK)

## (undated) DEVICE — EVACUATOR BLADDER ELLIK - (10/BX)

## (undated) DEVICE — BAG URODRAIN WITH TUBING - (20/CA)

## (undated) DEVICE — DRESSING POST OP BORDER 4 X 10 (5EA/BX)

## (undated) DEVICE — COVER FOOT UNIVERSAL DISP. - (25EA/CA)

## (undated) DEVICE — GLOVE BIOGEL SZ 8 SURGICAL PF LTX - (50PR/BX 4BX/CA)

## (undated) DEVICE — HUMID-VENT HEAT AND MOISTURE EXCHANGE- (50/BX)

## (undated) DEVICE — GLOVE BIOGEL PI ORTHO SZ 7.5 PF LF (40PR/BX)

## (undated) DEVICE — PIN, GUIDE

## (undated) DEVICE — KIT DRAPE RIO ONE PIECE WITH POCKETS (1EA)

## (undated) DEVICE — PIN FIXATION BONE STERILE 3.2MM X 140MM (2EA/PK)